# Patient Record
Sex: MALE | Race: WHITE | Employment: FULL TIME | URBAN - NONMETROPOLITAN AREA
[De-identification: names, ages, dates, MRNs, and addresses within clinical notes are randomized per-mention and may not be internally consistent; named-entity substitution may affect disease eponyms.]

---

## 2017-08-17 ENCOUNTER — OFFICE VISIT (OUTPATIENT)
Dept: CARDIOLOGY CLINIC | Age: 32
End: 2017-08-17
Payer: COMMERCIAL

## 2017-08-17 VITALS
BODY MASS INDEX: 26.05 KG/M2 | HEART RATE: 64 BPM | DIASTOLIC BLOOD PRESSURE: 70 MMHG | HEIGHT: 74 IN | WEIGHT: 203 LBS | SYSTOLIC BLOOD PRESSURE: 152 MMHG

## 2017-08-17 DIAGNOSIS — Z87.74 H/O COARCTATION OF AORTA: ICD-10-CM

## 2017-08-17 DIAGNOSIS — R01.1 SYSTOLIC MURMUR: Primary | ICD-10-CM

## 2017-08-17 PROCEDURE — 99213 OFFICE O/P EST LOW 20 MIN: CPT | Performed by: INTERNAL MEDICINE

## 2017-08-17 PROCEDURE — 93000 ELECTROCARDIOGRAM COMPLETE: CPT | Performed by: INTERNAL MEDICINE

## 2017-08-22 LAB
ALBUMIN SERPL-MCNC: 4.4 G/DL (ref 3.2–5.3)
ALK PHOSPHATASE: 60 IU/L (ref 35–121)
ALT SERPL-CCNC: 20 IU/L (ref 5–59)
AST SERPL-CCNC: 15 IU/L (ref 10–42)
BILIRUB SERPL-MCNC: 1.1 MG/DL (ref 0.2–1.3)
BILIRUBIN DIRECT: 0.2 MG/DL (ref 0–0.2)
CHOLESTEROL/HDL RATIO: 4.9
CHOLESTEROL: 170 MG/DL
HDLC SERPL-MCNC: 35 MG/DL (ref 40–60)
LDL CHOLESTEROL CALCULATED: 107 MG/DL
LDL/HDL RATIO: 3.1
TOTAL PROTEIN: 6.6 G/DL (ref 5.8–8)
TRIGL SERPL-MCNC: 140 MG/DL
TSH SERPL DL<=0.05 MIU/L-ACNC: 1.18 UIU/ML (ref 0.4–4.4)
VLDLC SERPL CALC-MCNC: 28 MG/DL

## 2018-08-16 ENCOUNTER — OFFICE VISIT (OUTPATIENT)
Dept: CARDIOLOGY CLINIC | Age: 33
End: 2018-08-16
Payer: COMMERCIAL

## 2018-08-16 VITALS
BODY MASS INDEX: 27.03 KG/M2 | SYSTOLIC BLOOD PRESSURE: 180 MMHG | WEIGHT: 210.6 LBS | DIASTOLIC BLOOD PRESSURE: 76 MMHG | HEIGHT: 74 IN | HEART RATE: 48 BPM

## 2018-08-16 DIAGNOSIS — Q25.1 COARCTATION OF AORTA: ICD-10-CM

## 2018-08-16 DIAGNOSIS — R01.1 SYSTOLIC MURMUR: Primary | ICD-10-CM

## 2018-08-16 PROCEDURE — 99213 OFFICE O/P EST LOW 20 MIN: CPT | Performed by: NUCLEAR MEDICINE

## 2018-08-16 PROCEDURE — 93000 ELECTROCARDIOGRAM COMPLETE: CPT | Performed by: NUCLEAR MEDICINE

## 2018-08-21 ENCOUNTER — HOSPITAL ENCOUNTER (OUTPATIENT)
Dept: CT IMAGING | Age: 33
Discharge: HOME | End: 2018-08-21
Payer: COMMERCIAL

## 2018-08-21 DIAGNOSIS — Q25.1 COARCTATION OF AORTA: ICD-10-CM

## 2018-08-21 DIAGNOSIS — R01.1 SYSTOLIC MURMUR: ICD-10-CM

## 2018-08-21 PROCEDURE — 6360000004 HC RX CONTRAST MEDICATION: Performed by: NUCLEAR MEDICINE

## 2018-08-21 PROCEDURE — 71275 CT ANGIOGRAPHY CHEST: CPT

## 2018-08-21 RX ADMIN — IOPAMIDOL 95 ML: 755 INJECTION, SOLUTION INTRAVENOUS at 07:10

## 2018-08-23 ENCOUNTER — TELEPHONE (OUTPATIENT)
Dept: CARDIOLOGY CLINIC | Age: 33
End: 2018-08-23

## 2018-08-23 NOTE — TELEPHONE ENCOUNTER
Tell him with his coarctation it is not very safe to do an exericise stress test, if anything I can do josesito   Also I think he should think about getting an opinion from thoracic surgeon to see if his aorta needs redone now that he is older.  He does have coarctation on the CTA which I expected with his elevated BP that is unusual for him   See what he wants to do or I can see to discuss these issues  Nothing urgent

## 2018-08-23 NOTE — TELEPHONE ENCOUNTER
Last OV pt was told you would decided on stress after CTA   CTA done yesterday   Pt needs clearance for DOT?   Please advise on stress test

## 2018-08-24 ENCOUNTER — TELEPHONE (OUTPATIENT)
Dept: CARDIOLOGY CLINIC | Age: 33
End: 2018-08-24

## 2018-08-24 NOTE — TELEPHONE ENCOUNTER
Pt is wondering if you don't want to do a exercise stress test on him, he is a runner, and lifts alot of weights,  And does not have any issues, should he be doing that stuff? His bp after sitting today for about 5 mins 151/70.

## 2018-08-24 NOTE — TELEPHONE ENCOUNTER
rubin said that he wants to see him to discuss options   He will look at his CTA and discuss with him

## 2018-08-28 ENCOUNTER — TELEPHONE (OUTPATIENT)
Dept: CARDIOLOGY CLINIC | Age: 33
End: 2018-08-28

## 2018-08-28 ENCOUNTER — OFFICE VISIT (OUTPATIENT)
Dept: CARDIOLOGY CLINIC | Age: 33
End: 2018-08-28
Payer: COMMERCIAL

## 2018-08-28 VITALS
HEART RATE: 80 BPM | HEIGHT: 74 IN | DIASTOLIC BLOOD PRESSURE: 113 MMHG | WEIGHT: 208 LBS | SYSTOLIC BLOOD PRESSURE: 198 MMHG | BODY MASS INDEX: 26.69 KG/M2

## 2018-08-28 DIAGNOSIS — Q25.1 COARCTATION OF AORTA: Primary | ICD-10-CM

## 2018-08-28 PROCEDURE — 99214 OFFICE O/P EST MOD 30 MIN: CPT | Performed by: INTERNAL MEDICINE

## 2018-09-04 ENCOUNTER — OFFICE VISIT (OUTPATIENT)
Dept: CARDIOLOGY CLINIC | Age: 33
End: 2018-09-04

## 2018-09-04 ENCOUNTER — TELEPHONE (OUTPATIENT)
Dept: CARDIOLOGY CLINIC | Age: 33
End: 2018-09-04

## 2018-09-04 VITALS
HEIGHT: 74 IN | WEIGHT: 208.8 LBS | SYSTOLIC BLOOD PRESSURE: 172 MMHG | BODY MASS INDEX: 26.8 KG/M2 | DIASTOLIC BLOOD PRESSURE: 92 MMHG

## 2018-09-04 DIAGNOSIS — Q25.1 COARCTATION OF AORTA: Primary | ICD-10-CM

## 2018-09-04 NOTE — TELEPHONE ENCOUNTER
Patient has a nurse visit for B/P checks 9/4/2018 at 10 am.   Called and talked to patient and he is going to bring in home B/P readings to his appt today.

## 2018-09-04 NOTE — PROGRESS NOTES
Called Dr. Sola Muniz he said to tell the pt that his B/P machine is wrong and he will call the pt later to start him on some medications.

## 2018-09-10 DIAGNOSIS — R01.1 SYSTOLIC MURMUR: Primary | ICD-10-CM

## 2018-09-10 RX ORDER — LOSARTAN POTASSIUM 25 MG/1
25 TABLET ORAL DAILY
Qty: 30 TABLET | Refills: 11 | Status: SHIPPED | OUTPATIENT
Start: 2018-09-10 | End: 2018-10-22

## 2018-09-10 RX ORDER — LOSARTAN POTASSIUM 25 MG/1
25 TABLET ORAL DAILY
COMMUNITY
End: 2018-09-10 | Stop reason: SDUPTHER

## 2018-09-10 RX ORDER — HYDROCHLOROTHIAZIDE 25 MG/1
25 TABLET ORAL DAILY
Qty: 30 TABLET | Refills: 11 | Status: SHIPPED | OUTPATIENT
Start: 2018-09-10 | End: 2019-11-16 | Stop reason: SDUPTHER

## 2018-09-10 RX ORDER — HYDROCHLOROTHIAZIDE 25 MG/1
25 TABLET ORAL DAILY
COMMUNITY
End: 2018-09-10 | Stop reason: SDUPTHER

## 2018-09-26 ENCOUNTER — OFFICE VISIT (OUTPATIENT)
Dept: CARDIOLOGY CLINIC | Age: 33
End: 2018-09-26
Payer: COMMERCIAL

## 2018-09-26 VITALS
DIASTOLIC BLOOD PRESSURE: 105 MMHG | WEIGHT: 209.6 LBS | HEART RATE: 72 BPM | SYSTOLIC BLOOD PRESSURE: 194 MMHG | HEIGHT: 74 IN | BODY MASS INDEX: 26.9 KG/M2

## 2018-09-26 DIAGNOSIS — I10 ESSENTIAL HYPERTENSION: ICD-10-CM

## 2018-09-26 DIAGNOSIS — Q25.1 COARCTATION OF AORTA: Primary | ICD-10-CM

## 2018-09-26 DIAGNOSIS — R01.1 SYSTOLIC MURMUR: ICD-10-CM

## 2018-09-26 LAB
ANION GAP SERPL CALCULATED.3IONS-SCNC: 14 MEQ/L (ref 10–19)
BUN BLDV-MCNC: 16 MG/DL (ref 8–23)
CALCIUM SERPL-MCNC: 9.4 MG/DL (ref 8.5–10.5)
CHLORIDE BLD-SCNC: 98 MEQ/L (ref 95–107)
CO2: 29 MEQ/L (ref 19–31)
CREAT SERPL-MCNC: 1.1 MG/DL (ref 0.8–1.4)
EGFR AFRICAN AMERICAN: 100.3 ML/MIN/1.73 M2
EGFR IF NONAFRICAN AMERICAN: 86.5 ML/MIN/1.73 M2
GLUCOSE: 108 MG/DL (ref 70–99)
POTASSIUM SERPL-SCNC: 4 MEQ/L (ref 3.5–5.4)
SODIUM BLD-SCNC: 141 MEQ/L (ref 135–146)

## 2018-09-26 PROCEDURE — 99213 OFFICE O/P EST LOW 20 MIN: CPT | Performed by: INTERNAL MEDICINE

## 2018-09-26 RX ORDER — LOSARTAN POTASSIUM 50 MG/1
50 TABLET ORAL DAILY
Qty: 30 TABLET | Refills: 3 | Status: SHIPPED | OUTPATIENT
Start: 2018-09-26 | End: 2018-11-05 | Stop reason: DRUGHIGH

## 2018-09-26 NOTE — PROGRESS NOTES
intolerance, polydipsia. Genitourinary: Negative for dysuria, enuresis, flank pain and hematuria. Musculoskeletal: Negative for arthralgias, joint swelling and neck pain. Neurological: Negative for numbness and headaches. Psychiatric/Behavioral: Negative for agitation, confusion, decreased concentration and dysphoric mood. Objective:     BP (!) 194/105 (Site: Left Calf, Position: Sitting, Cuff Size: Large Adult)   Pulse 72   Ht 6' 2\" (1.88 m)   Wt 209 lb 9.6 oz (95.1 kg)   BMI 26.91 kg/m²     Wt Readings from Last 3 Encounters:   09/26/18 209 lb 9.6 oz (95.1 kg)   09/04/18 208 lb 12.8 oz (94.7 kg)   08/28/18 208 lb (94.3 kg)     BP Readings from Last 3 Encounters:   09/26/18 (!) 194/105   09/04/18 (!) 172/92   08/28/18 (!) 198/113       Nursing note and vitals reviewed. LE BP > UE BP    Physical Exam   Constitutional: Oriented to person, place, and time. Appears well-developed and well-nourished. HENT:   Head: Normocephalic and atraumatic. Eyes: EOM are normal. Pupils are equal, round, and reactive to light. Neck: Normal range of motion. Neck supple. No JVD present. Cardiovascular: Normal rate, regular rhythm, normal heart sounds and intact distal pulses. + systolic murmur at the apex  Pulmonary/Chest: Effort normal and breath sounds normal. No respiratory distress. No wheezes. No rales. Abdominal: Soft. Bowel sounds are normal. No distension. There is no tenderness. Musculoskeletal: Normal range of motion. No edema. Neurological: Alert and oriented to person, place, and time. No cranial nerve deficit. Coordination normal.   Skin: Skin is warm and dry. Psychiatric: Normal mood and affect.        No results found for: CKTOTAL, CKMB, CKMBINDEX    Lab Results   Component Value Date    WBC 7.9 11/24/2016    RBC 4.71 11/24/2016    RBC 4.71 02/23/2012    HGB 15.0 11/24/2016    HCT 42.5 11/24/2016    MCV 90.2 11/24/2016    MCH 31.9 11/24/2016    MCHC 35.3 11/24/2016    RDW 12.9 11/24/2016

## 2018-10-22 ENCOUNTER — OFFICE VISIT (OUTPATIENT)
Dept: CARDIOLOGY CLINIC | Age: 33
End: 2018-10-22
Payer: COMMERCIAL

## 2018-10-22 VITALS
SYSTOLIC BLOOD PRESSURE: 194 MMHG | HEIGHT: 74 IN | BODY MASS INDEX: 25.67 KG/M2 | WEIGHT: 200 LBS | DIASTOLIC BLOOD PRESSURE: 95 MMHG | HEART RATE: 74 BPM

## 2018-10-22 DIAGNOSIS — Q25.1 COARCTATION OF AORTA: ICD-10-CM

## 2018-10-22 DIAGNOSIS — I15.8 OTHER SECONDARY HYPERTENSION: Primary | ICD-10-CM

## 2018-10-22 PROCEDURE — 99213 OFFICE O/P EST LOW 20 MIN: CPT | Performed by: INTERNAL MEDICINE

## 2018-10-22 RX ORDER — AMLODIPINE BESYLATE 10 MG/1
10 TABLET ORAL DAILY
Qty: 30 TABLET | Refills: 3 | Status: SHIPPED | OUTPATIENT
Start: 2018-10-22 | End: 2018-11-05 | Stop reason: SDUPTHER

## 2018-10-22 RX ORDER — LOSARTAN POTASSIUM 100 MG/1
100 TABLET ORAL DAILY
COMMUNITY
End: 2018-11-05 | Stop reason: SDUPTHER

## 2018-10-22 RX ORDER — HYDRALAZINE HYDROCHLORIDE 25 MG/1
25 TABLET, FILM COATED ORAL 3 TIMES DAILY
Qty: 90 TABLET | Refills: 3 | Status: SHIPPED | OUTPATIENT
Start: 2018-10-22 | End: 2018-11-05 | Stop reason: SDUPTHER

## 2018-10-22 NOTE — PROGRESS NOTES
09/25/2018    LABGLOM 77 11/24/2016    GLUCOSE 108 09/25/2018       Lab Results   Component Value Date    ALKPHOS 60 08/21/2017    ALT 20 08/21/2017    AST 15 08/21/2017    PROT 6.6 08/21/2017    BILITOT 1.1 08/21/2017    BILIDIR 0.2 08/21/2017    LABALBU 4.4 08/21/2017       No results found for: MG    No results found for: INR, PROTIME      No results found for: LABA1C    Lab Results   Component Value Date    TRIG 140 08/21/2017    HDL 35 08/21/2017    LDLCALC 107 08/21/2017    LABVLDL 28 08/21/2017       Lab Results   Component Value Date    TSH 1.180 08/21/2017         Testing Reviewed:      I have individually reviewed the cardiac test below:    ECHO:   Results for orders placed during the hospital encounter of 05/08/15   ECHO Complete 2D W Doppler W Color        Assessment/Plan   Re-coarctation of the Aorta  HTN  Add Norvasc 10 mg q day, increase Losartan to 100 mg q day, add Hydralazine 25 mg TID. Would add Spironolactone 25 mg PO q day next. May need renal consultation as well for management. May need cath to evaluate gradient and discuss coarctation stenting.     Disposition:  2-4 weeks    Electronically signed by Vasile Moreno MD   10/22/2018 at 8:33 AM

## 2018-11-05 ENCOUNTER — OFFICE VISIT (OUTPATIENT)
Dept: CARDIOLOGY CLINIC | Age: 33
End: 2018-11-05
Payer: COMMERCIAL

## 2018-11-05 VITALS
DIASTOLIC BLOOD PRESSURE: 118 MMHG | HEART RATE: 90 BPM | WEIGHT: 203.1 LBS | SYSTOLIC BLOOD PRESSURE: 183 MMHG | HEIGHT: 74 IN | BODY MASS INDEX: 26.06 KG/M2

## 2018-11-05 DIAGNOSIS — Q25.1 COARCTATION OF AORTA: Primary | ICD-10-CM

## 2018-11-05 PROCEDURE — 99212 OFFICE O/P EST SF 10 MIN: CPT | Performed by: INTERNAL MEDICINE

## 2018-11-05 RX ORDER — AMLODIPINE BESYLATE 10 MG/1
10 TABLET ORAL DAILY
Qty: 90 TABLET | Refills: 3 | Status: SHIPPED | OUTPATIENT
Start: 2018-11-05 | End: 2019-11-14 | Stop reason: SDUPTHER

## 2018-11-05 RX ORDER — HYDRALAZINE HYDROCHLORIDE 25 MG/1
25 TABLET, FILM COATED ORAL 3 TIMES DAILY
Qty: 270 TABLET | Refills: 3 | Status: SHIPPED | OUTPATIENT
Start: 2018-11-05 | End: 2019-11-14 | Stop reason: SDUPTHER

## 2018-11-05 RX ORDER — LOSARTAN POTASSIUM 100 MG/1
100 TABLET ORAL DAILY
Qty: 90 TABLET | Refills: 3 | Status: SHIPPED | OUTPATIENT
Start: 2018-11-05 | End: 2019-11-14 | Stop reason: SDUPTHER

## 2018-11-05 NOTE — PROGRESS NOTES
in stool, constipation, diarrhea   Endocrine: Negative for cold intolerance, heat intolerance, polydipsia. Genitourinary: Negative for dysuria, enuresis, flank pain and hematuria. Musculoskeletal: Negative for arthralgias, joint swelling and neck pain. Neurological: Negative for numbness and headaches. Psychiatric/Behavioral: Negative for agitation, confusion, decreased concentration and dysphoric mood. Objective:     BP (!) 183/118 Comment (Site): left ankle  Pulse 90   Ht 6' 2\" (1.88 m)   Wt 203 lb 1.6 oz (92.1 kg)   BMI 26.08 kg/m²     Wt Readings from Last 3 Encounters:   11/05/18 203 lb 1.6 oz (92.1 kg)   10/22/18 200 lb (90.7 kg)   09/26/18 209 lb 9.6 oz (95.1 kg)     BP Readings from Last 3 Encounters:   11/05/18 (!) 183/118   10/22/18 (!) 194/95   09/26/18 (!) 194/105       Nursing note and vitals reviewed. Physical Exam   Constitutional: Oriented to person, place, and time. Appears well-developed and well-nourished. HENT:   Head: Normocephalic and atraumatic. Eyes: EOM are normal. Pupils are equal, round, and reactive to light. Neck: Normal range of motion. Neck supple. No JVD present. Cardiovascular: Normal rate, regular rhythm, normal heart sounds and intact distal pulses. 3/6 CHAVEZ. Pulmonary/Chest: Effort normal and breath sounds normal. No respiratory distress. No wheezes. No rales. Abdominal: Soft. Bowel sounds are normal. No distension. There is no tenderness. Musculoskeletal: Normal range of motion. No edema. Neurological: Alert and oriented to person, place, and time. No cranial nerve deficit. Coordination normal.   Skin: Skin is warm and dry. Psychiatric: Normal mood and affect.        No results found for: CKTOTAL, CKMB, CKMBINDEX    Lab Results   Component Value Date    WBC 7.9 11/24/2016    RBC 4.71 11/24/2016    RBC 4.71 02/23/2012    HGB 15.0 11/24/2016    HCT 42.5 11/24/2016    MCV 90.2 11/24/2016    MCH 31.9 11/24/2016    MCHC 35.3 11/24/2016    RDW 12.9 11/24/2016     11/24/2016    MPV 8.5 11/24/2016       Lab Results   Component Value Date     09/25/2018    K 4.0 09/25/2018    CL 98 09/25/2018    CO2 29 09/25/2018    BUN 16 09/25/2018    LABALBU 4.4 08/21/2017    CREATININE 1.1 09/25/2018    CALCIUM 9.4 09/25/2018    LABGLOM 77 11/24/2016    GLUCOSE 108 09/25/2018       Lab Results   Component Value Date    ALKPHOS 60 08/21/2017    ALT 20 08/21/2017    AST 15 08/21/2017    PROT 6.6 08/21/2017    BILITOT 1.1 08/21/2017    BILIDIR 0.2 08/21/2017    LABALBU 4.4 08/21/2017       No results found for: MG    No results found for: INR, PROTIME      No results found for: LABA1C    Lab Results   Component Value Date    TRIG 140 08/21/2017    HDL 35 08/21/2017    LDLCALC 107 08/21/2017    LABVLDL 28 08/21/2017       Lab Results   Component Value Date    TSH 1.180 08/21/2017         Testing Reviewed:      I have individually reviewed the cardiac test below:    ECHO:   Results for orders placed during the hospital encounter of 05/08/15   ECHO Complete 2D W Doppler W Color        Assessment/Plan   Re-coarctation of the Aorta  Secondary HTN  Continue current therapy. Will discuss with Dr. Tristan Jacobsen of [facility] regarding next steps in terms of pursuing cardiac catheterization for gradient evaluation and possible balloon/stenting of the aorta.       Disposition:  Based on quaternary care recommendations  6 months      Electronically signed by Anell Frankel, MD   11/5/2018 at 7:41 AM

## 2018-11-06 VITALS — HEIGHT: 74 IN | WEIGHT: 200 LBS | BODY MASS INDEX: 25.67 KG/M2

## 2018-11-08 ENCOUNTER — TELEPHONE (OUTPATIENT)
Dept: CARDIOLOGY CLINIC | Age: 33
End: 2018-11-08

## 2018-11-14 ENCOUNTER — HOSPITAL ENCOUNTER (OUTPATIENT)
Dept: INPATIENT UNIT | Age: 33
Discharge: HOME | End: 2018-11-14

## 2019-11-14 DIAGNOSIS — Q25.1 COARCTATION OF AORTA: ICD-10-CM

## 2019-11-15 ENCOUNTER — HOSPITAL ENCOUNTER (EMERGENCY)
Age: 34
Discharge: HOME | End: 2019-11-15
Attending: EMERGENCY MEDICINE
Payer: COMMERCIAL

## 2019-11-15 VITALS
OXYGEN SATURATION: 96 % | WEIGHT: 195 LBS | HEIGHT: 74 IN | SYSTOLIC BLOOD PRESSURE: 159 MMHG | BODY MASS INDEX: 25.03 KG/M2 | RESPIRATION RATE: 18 BRPM | HEART RATE: 81 BPM | DIASTOLIC BLOOD PRESSURE: 87 MMHG | TEMPERATURE: 98.7 F

## 2019-11-15 DIAGNOSIS — I15.9 SECONDARY HYPERTENSION: Primary | ICD-10-CM

## 2019-11-15 DIAGNOSIS — Q25.1 COARCTATION OF AORTA: ICD-10-CM

## 2019-11-15 LAB
EKG ATRIAL RATE: 82 BPM
EKG P AXIS: 50 DEGREES
EKG P-R INTERVAL: 132 MS
EKG Q-T INTERVAL: 366 MS
EKG QRS DURATION: 96 MS
EKG QTC CALCULATION (BAZETT): 427 MS
EKG R AXIS: 79 DEGREES
EKG T AXIS: 50 DEGREES
EKG VENTRICULAR RATE: 82 BPM

## 2019-11-15 PROCEDURE — 6370000000 HC RX 637 (ALT 250 FOR IP): Performed by: EMERGENCY MEDICINE

## 2019-11-15 PROCEDURE — 93005 ELECTROCARDIOGRAM TRACING: CPT | Performed by: EMERGENCY MEDICINE

## 2019-11-15 PROCEDURE — 99283 EMERGENCY DEPT VISIT LOW MDM: CPT

## 2019-11-15 RX ORDER — LOSARTAN POTASSIUM 100 MG/1
100 TABLET ORAL ONCE
Status: COMPLETED | OUTPATIENT
Start: 2019-11-15 | End: 2019-11-15

## 2019-11-15 RX ORDER — AMLODIPINE BESYLATE 10 MG/1
10 TABLET ORAL DAILY
Qty: 30 TABLET | Refills: 0 | Status: SHIPPED | OUTPATIENT
Start: 2019-11-15 | End: 2020-01-14 | Stop reason: ALTCHOICE

## 2019-11-15 RX ORDER — HYDROCHLOROTHIAZIDE 25 MG/1
25 TABLET ORAL ONCE
Status: COMPLETED | OUTPATIENT
Start: 2019-11-15 | End: 2019-11-15

## 2019-11-15 RX ORDER — HYDROCHLOROTHIAZIDE 25 MG/1
25 TABLET ORAL DAILY
Qty: 30 TABLET | Refills: 1 | Status: CANCELLED | OUTPATIENT
Start: 2019-11-15

## 2019-11-15 RX ORDER — HYDROCHLOROTHIAZIDE 25 MG/1
25 TABLET ORAL EVERY MORNING
Qty: 30 TABLET | Refills: 0 | Status: SHIPPED | OUTPATIENT
Start: 2019-11-15 | End: 2020-01-14 | Stop reason: ALTCHOICE

## 2019-11-15 RX ORDER — LOSARTAN POTASSIUM 100 MG/1
100 TABLET ORAL DAILY
Qty: 30 TABLET | Refills: 0 | Status: SHIPPED | OUTPATIENT
Start: 2019-11-15 | End: 2020-01-14 | Stop reason: ALTCHOICE

## 2019-11-15 RX ORDER — HYDRALAZINE HYDROCHLORIDE 25 MG/1
25 TABLET, FILM COATED ORAL 3 TIMES DAILY
Qty: 90 TABLET | Refills: 0 | Status: SHIPPED | OUTPATIENT
Start: 2019-11-15 | End: 2020-01-14 | Stop reason: ALTCHOICE

## 2019-11-15 RX ADMIN — HYDROCHLOROTHIAZIDE 25 MG: 25 TABLET ORAL at 20:35

## 2019-11-15 RX ADMIN — LOSARTAN POTASSIUM 100 MG: 100 TABLET, FILM COATED ORAL at 20:35

## 2019-11-15 ASSESSMENT — ENCOUNTER SYMPTOMS
EYE PAIN: 0
EYE DISCHARGE: 0
DIARRHEA: 0
STRIDOR: 0
VOMITING: 0
BACK PAIN: 0
RHINORRHEA: 0
CHEST TIGHTNESS: 0
COUGH: 0
WHEEZING: 0
NAUSEA: 0
SHORTNESS OF BREATH: 0
CONSTIPATION: 0
ABDOMINAL PAIN: 0
ABDOMINAL DISTENTION: 0
PHOTOPHOBIA: 0
EYE ITCHING: 0
EYE REDNESS: 0
SORE THROAT: 0

## 2019-11-16 DIAGNOSIS — Z87.74 H/O COARCTATION OF AORTA: Primary | ICD-10-CM

## 2019-11-16 DIAGNOSIS — R01.1 SYSTOLIC MURMUR: ICD-10-CM

## 2019-11-16 PROCEDURE — 93010 ELECTROCARDIOGRAM REPORT: CPT | Performed by: INTERNAL MEDICINE

## 2019-11-16 RX ORDER — LOSARTAN POTASSIUM 100 MG/1
TABLET ORAL
Qty: 90 TABLET | Refills: 0 | Status: SHIPPED | OUTPATIENT
Start: 2019-11-16 | End: 2019-12-09 | Stop reason: SDUPTHER

## 2019-11-16 RX ORDER — HYDRALAZINE HYDROCHLORIDE 25 MG/1
TABLET, FILM COATED ORAL
Qty: 270 TABLET | Refills: 0 | Status: SHIPPED | OUTPATIENT
Start: 2019-11-16 | End: 2019-12-09 | Stop reason: SDUPTHER

## 2019-11-16 RX ORDER — AMLODIPINE BESYLATE 10 MG/1
TABLET ORAL
Qty: 90 TABLET | Refills: 0 | Status: SHIPPED | OUTPATIENT
Start: 2019-11-16 | End: 2019-12-09 | Stop reason: SDUPTHER

## 2019-11-18 ENCOUNTER — TELEPHONE (OUTPATIENT)
Dept: FAMILY MEDICINE CLINIC | Age: 34
End: 2019-11-18

## 2019-11-18 RX ORDER — HYDROCHLOROTHIAZIDE 25 MG/1
TABLET ORAL
Qty: 30 TABLET | Refills: 1 | Status: SHIPPED | OUTPATIENT
Start: 2019-11-18 | End: 2019-12-09 | Stop reason: SDUPTHER

## 2019-11-25 LAB
ANION GAP SERPL CALCULATED.3IONS-SCNC: 7 MMOL/L (ref 4–12)
BUN BLDV-MCNC: 14 MG/DL (ref 5–23)
CALCIUM SERPL-MCNC: 9.6 MG/DL (ref 8.5–10.5)
CHLORIDE BLD-SCNC: 101 MMOL/L (ref 98–109)
CO2: 32 MMOL/L (ref 22–32)
CREAT SERPL-MCNC: 1.08 MG/DL (ref 0.6–1.3)
EGFR AFRICAN AMERICAN: >60 ML/MIN/1.73SQ.M
EGFR IF NONAFRICAN AMERICAN: >60 ML/MIN/1.73SQ.M
GLUCOSE: 95 MG/DL (ref 65–99)
POTASSIUM SERPL-SCNC: 4 MMOL/L (ref 3.5–5)
SODIUM BLD-SCNC: 140 MMOL/L (ref 134–146)

## 2019-12-09 ENCOUNTER — OFFICE VISIT (OUTPATIENT)
Dept: CARDIOLOGY CLINIC | Age: 34
End: 2019-12-09
Payer: COMMERCIAL

## 2019-12-09 VITALS
BODY MASS INDEX: 25.8 KG/M2 | HEIGHT: 74 IN | SYSTOLIC BLOOD PRESSURE: 140 MMHG | DIASTOLIC BLOOD PRESSURE: 84 MMHG | WEIGHT: 201 LBS | HEART RATE: 80 BPM

## 2019-12-09 DIAGNOSIS — Q25.1 COARCTATION OF AORTA: ICD-10-CM

## 2019-12-09 DIAGNOSIS — I15.8 OTHER SECONDARY HYPERTENSION: Primary | ICD-10-CM

## 2019-12-09 PROCEDURE — 99212 OFFICE O/P EST SF 10 MIN: CPT | Performed by: INTERNAL MEDICINE

## 2019-12-09 RX ORDER — HYDRALAZINE HYDROCHLORIDE 50 MG/1
TABLET, FILM COATED ORAL
Qty: 90 TABLET | Refills: 3 | Status: SHIPPED | OUTPATIENT
Start: 2019-12-09 | End: 2020-05-04

## 2019-12-09 RX ORDER — LOSARTAN POTASSIUM 100 MG/1
TABLET ORAL
Qty: 90 TABLET | Refills: 3 | Status: SHIPPED | OUTPATIENT
Start: 2019-12-09 | End: 2020-12-15 | Stop reason: SDUPTHER

## 2019-12-09 RX ORDER — HYDROCHLOROTHIAZIDE 25 MG/1
TABLET ORAL
Qty: 90 TABLET | Refills: 3 | Status: SHIPPED | OUTPATIENT
Start: 2019-12-09 | End: 2020-12-15 | Stop reason: SDUPTHER

## 2019-12-09 RX ORDER — AMLODIPINE BESYLATE 10 MG/1
TABLET ORAL
Qty: 90 TABLET | Refills: 3 | Status: SHIPPED | OUTPATIENT
Start: 2019-12-09 | End: 2020-12-15 | Stop reason: SDUPTHER

## 2020-01-14 ENCOUNTER — HOSPITAL ENCOUNTER (OUTPATIENT)
Dept: NON INVASIVE DIAGNOSTICS | Age: 35
Discharge: HOME | End: 2020-01-14
Payer: COMMERCIAL

## 2020-01-14 VITALS — HEIGHT: 74 IN | WEIGHT: 195 LBS | BODY MASS INDEX: 25.03 KG/M2

## 2020-01-14 PROCEDURE — 93017 CV STRESS TEST TRACING ONLY: CPT | Performed by: INTERNAL MEDICINE

## 2020-01-15 ENCOUNTER — TELEPHONE (OUTPATIENT)
Dept: CARDIOLOGY CLINIC | Age: 35
End: 2020-01-15

## 2020-03-12 ENCOUNTER — OFFICE VISIT (OUTPATIENT)
Dept: FAMILY MEDICINE CLINIC | Age: 35
End: 2020-03-12
Payer: COMMERCIAL

## 2020-03-12 ENCOUNTER — TELEPHONE (OUTPATIENT)
Dept: FAMILY MEDICINE CLINIC | Age: 35
End: 2020-03-12

## 2020-03-12 VITALS
BODY MASS INDEX: 25.99 KG/M2 | WEIGHT: 202.5 LBS | DIASTOLIC BLOOD PRESSURE: 62 MMHG | HEART RATE: 72 BPM | HEIGHT: 74 IN | RESPIRATION RATE: 20 BRPM | SYSTOLIC BLOOD PRESSURE: 152 MMHG | TEMPERATURE: 98.2 F

## 2020-03-12 PROCEDURE — 99203 OFFICE O/P NEW LOW 30 MIN: CPT | Performed by: NURSE PRACTITIONER

## 2020-03-12 RX ORDER — PREDNISONE 50 MG/1
50 TABLET ORAL DAILY
Qty: 4 TABLET | Refills: 0 | Status: SHIPPED | OUTPATIENT
Start: 2020-03-12 | End: 2020-03-16

## 2020-03-12 SDOH — ECONOMIC STABILITY: FOOD INSECURITY: WITHIN THE PAST 12 MONTHS, THE FOOD YOU BOUGHT JUST DIDN'T LAST AND YOU DIDN'T HAVE MONEY TO GET MORE.: NEVER TRUE

## 2020-03-12 SDOH — ECONOMIC STABILITY: FOOD INSECURITY: WITHIN THE PAST 12 MONTHS, YOU WORRIED THAT YOUR FOOD WOULD RUN OUT BEFORE YOU GOT MONEY TO BUY MORE.: NEVER TRUE

## 2020-03-12 SDOH — ECONOMIC STABILITY: INCOME INSECURITY: HOW HARD IS IT FOR YOU TO PAY FOR THE VERY BASICS LIKE FOOD, HOUSING, MEDICAL CARE, AND HEATING?: NOT HARD AT ALL

## 2020-03-12 SDOH — ECONOMIC STABILITY: TRANSPORTATION INSECURITY
IN THE PAST 12 MONTHS, HAS LACK OF TRANSPORTATION KEPT YOU FROM MEETINGS, WORK, OR FROM GETTING THINGS NEEDED FOR DAILY LIVING?: NO

## 2020-03-12 SDOH — ECONOMIC STABILITY: TRANSPORTATION INSECURITY
IN THE PAST 12 MONTHS, HAS THE LACK OF TRANSPORTATION KEPT YOU FROM MEDICAL APPOINTMENTS OR FROM GETTING MEDICATIONS?: NO

## 2020-03-12 ASSESSMENT — PATIENT HEALTH QUESTIONNAIRE - PHQ9
SUM OF ALL RESPONSES TO PHQ QUESTIONS 1-9: 0
2. FEELING DOWN, DEPRESSED OR HOPELESS: 0
SUM OF ALL RESPONSES TO PHQ QUESTIONS 1-9: 0
1. LITTLE INTEREST OR PLEASURE IN DOING THINGS: 0
SUM OF ALL RESPONSES TO PHQ9 QUESTIONS 1 & 2: 0

## 2020-03-12 ASSESSMENT — ENCOUNTER SYMPTOMS
SHORTNESS OF BREATH: 0
SORE THROAT: 0
COUGH: 0
RHINORRHEA: 0
ABDOMINAL PAIN: 0
VOICE CHANGE: 1
NAUSEA: 0

## 2020-03-12 NOTE — LETTER
[address] [address]  Phone: [phone]  Fax: [phone], APRN - CNP        March 12, 2020     Patient: Beth Juarez   YOB: 1985   Date of Visit: 3/12/2020       To Whom it May Concern:    Rodolfo Posada was seen in my clinic on 3/12/2020. Based on exam and history he is deemed is non-infectious. If you have any questions or concerns, please don't hesitate to call.     Sincerely,         BARBARA March CNP

## 2020-03-12 NOTE — PROGRESS NOTES
Subjective:      Patient ID: Beth Juarez is a 37 y.o. male. HPI: Acute for Laryngitis    Chief Complaint   Patient presents with    New Patient    Laryngitis     started Tuesday night       Onset of 2 days with hoarse voice. 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  Throat started bothering there after. Losing his voice off and on. DEnies ST or dysphagia. No coughing. Physically feels well. No body aches or fevers. Vitals:    03/12/20 1109   BP: (!) 152/62   Pulse:    Resp:    Temp:        Hx of secondary HTN - following with [facility]. On Losartan, HCTZ, Norvasc and Hydralyzine. BP running good at home. Ole Fletcher    Lab Results   Component Value Date     11/25/2019    K 4.0 11/25/2019     11/25/2019    CO2 32 11/25/2019    BUN 14 11/25/2019    CREATININE 1.08 11/25/2019    GLUCOSE 95 11/25/2019    CALCIUM 9.6 11/25/2019          Review of Systems   Constitutional: Negative for chills and fever. HENT: Positive for postnasal drip and voice change. Negative for congestion, rhinorrhea and sore throat. Respiratory: Negative for cough and shortness of breath. Cardiovascular: Negative for chest pain. Gastrointestinal: Negative for abdominal pain and nausea. Skin: Negative for rash. Neurological: Negative for dizziness, light-headedness and headaches. Psychiatric/Behavioral: Negative. Objective:   Physical Exam  Constitutional:       General: He is not in acute distress. Eyes:      Pupils: Pupils are equal, round, and reactive to light. Neck:      Musculoskeletal: Normal range of motion and neck supple. Cardiovascular:      Rate and Rhythm: Normal rate and regular rhythm. Heart sounds: No murmur. Pulmonary:      Effort: Pulmonary effort is normal.      Breath sounds: Normal breath sounds. No wheezing. Abdominal:      General: Bowel sounds are normal. There is no distension. Palpations: Abdomen is soft. Tenderness: There is no abdominal tenderness. Musculoskeletal: Normal range of motion. General: No tenderness. Skin:     General: Skin is warm and dry. Findings: No rash. Assessment:       Diagnosis Orders   1.  Laryngitis, acute  predniSONE (DELTASONE) 50 MG tablet           Plan:      Symptoms non-infectious  Orders as above   Warm fluids, voice rest  FOllow up with [facility]   - BP running wnl at home  RTO if symptoms worsen or stay the same              Lenore Lemus APRN - CNP

## 2020-03-12 NOTE — PROGRESS NOTES
Visit Information    Have you changed or started any medications since your last visit including any over-the-counter medicines, vitamins, or herbal medicines? no   Are you having any side effects from any of your medications? -  no  Have you stopped taking any of your medications? Is so, why? -  no    Have you seen any other physician or provider since your last visit? No  Have you had any other diagnostic tests since your last visit? No  Have you been seen in the emergency room and/or had an admission to a hospital since we last saw you? No  Have you had your routine dental cleaning in the past 6 months? n/a    Have you activated your [employer] account? If not, what are your barriers?  No: declined     Patient Care Team:  BARBARA Smith CNP as PCP - General (Nurse Practitioner)    Medical History Review  Past Medical, Family, and Social History reviewed and does not contribute to the patient presenting condition    Health Maintenance   Topic Date Due    Varicella vaccine (1 of 2 - 2-dose childhood series) 05/23/1984    HIV screen  05/23/1998    DTaP/Tdap/Td vaccine (1 - Tdap) 05/23/2002    Flu vaccine (1) 09/01/2019    Potassium monitoring  11/25/2020    Creatinine monitoring  11/25/2020    Shingles Vaccine (1 of 2) 05/23/2033    Hepatitis A vaccine  Aged Out    Hepatitis B vaccine  Aged Out    Hib vaccine  Aged Out    Meningococcal (ACWY) vaccine  Aged Out    Pneumococcal 0-64 years Vaccine  Aged Out

## 2020-05-04 RX ORDER — HYDRALAZINE HYDROCHLORIDE 50 MG/1
TABLET, FILM COATED ORAL
Qty: 90 TABLET | Refills: 3 | Status: SHIPPED | OUTPATIENT
Start: 2020-05-04 | End: 2020-11-03 | Stop reason: SDUPTHER

## 2020-05-06 ENCOUNTER — TELEPHONE (OUTPATIENT)
Dept: CARDIOLOGY CLINIC | Age: 35
End: 2020-05-06

## 2020-05-06 NOTE — TELEPHONE ENCOUNTER
Pt would like to see Dr. Angela Estrada. Laying at night in bed, feels like his heart is skipping a beat. Not every night, not during day, no other symptoms.   Please advise

## 2020-05-07 ENCOUNTER — HOSPITAL ENCOUNTER (OUTPATIENT)
Dept: NON INVASIVE DIAGNOSTICS | Age: 35
Discharge: HOME | End: 2020-05-07
Payer: COMMERCIAL

## 2020-05-07 PROCEDURE — 93226 XTRNL ECG REC<48 HR SCAN A/R: CPT

## 2020-05-07 PROCEDURE — 93225 XTRNL ECG REC<48 HRS REC: CPT

## 2020-05-13 LAB
ACQUISITION DURATION: NORMAL S
AVERAGE HEART RATE: 65 BPM
FASTEST SUPRAVENTRICULAR RATE: 127 BPM
HOOKUP DATE: NORMAL
HOOKUP TIME: NORMAL
LONGEST SUPRAVENTRICULAR RATE: 90 BPM
MAX HEART RATE TIME/DATE: NORMAL
MAX HEART RATE: 142 BPM
MIN HEART RATE TIME/DATE: NORMAL
MIN HEART RATE: 38 BPM
NUMBER OF FASTEST SUPRAVENTRICULAR BEATS: 4
NUMBER OF LONGEST SUPRAVENTRICULAR BEATS: 75
NUMBER OF QRS COMPLEXES: NORMAL
NUMBER OF SUPRAVENTRICULAR BEATS IN RUNS: 873
NUMBER OF SUPRAVENTRICULAR COUPLETS: 124
NUMBER OF SUPRAVENTRICULAR ECTOPICS: 2390
NUMBER OF SUPRAVENTRICULAR ISOLATED BEATS: 1269
NUMBER OF SUPRAVENTRICULAR RUNS: 96
NUMBER OF VENTRICULAR BEATS IN RUNS: 0
NUMBER OF VENTRICULAR BIGEMINAL CYCLES: 0
NUMBER OF VENTRICULAR COUPLETS: 0
NUMBER OF VENTRICULAR ECTOPICS: 466
NUMBER OF VENTRICULAR ISOLATED BEATS: 466
NUMBER OF VENTRICULAR RUNS: 0

## 2020-05-14 NOTE — TELEPHONE ENCOUNTER
Results of holter monitor  10% of the time patient was tachycardic  44% of the time patient was bradycardic  Max R-R is 2.3 seconds  466 PVCs  1269 PACs  96 SVT runs, fastest was 4 beats at 127 bpm, and longest was 75 beats at 90 bpm     Palpitations intermittently correlated to PVCs.

## 2020-05-18 RX ORDER — METOPROLOL SUCCINATE 50 MG/1
50 TABLET, EXTENDED RELEASE ORAL DAILY
Qty: 30 TABLET | Refills: 5 | Status: SHIPPED | OUTPATIENT
Start: 2020-05-18 | End: 2020-11-12 | Stop reason: SDUPTHER

## 2020-06-03 ENCOUNTER — OFFICE VISIT (OUTPATIENT)
Dept: CARDIOLOGY CLINIC | Age: 35
End: 2020-06-03
Payer: COMMERCIAL

## 2020-06-03 VITALS
DIASTOLIC BLOOD PRESSURE: 72 MMHG | HEART RATE: 68 BPM | HEIGHT: 74 IN | WEIGHT: 197 LBS | BODY MASS INDEX: 25.28 KG/M2 | SYSTOLIC BLOOD PRESSURE: 128 MMHG

## 2020-06-03 PROCEDURE — 99213 OFFICE O/P EST LOW 20 MIN: CPT | Performed by: INTERNAL MEDICINE

## 2020-11-03 RX ORDER — HYDRALAZINE HYDROCHLORIDE 50 MG/1
50 TABLET, FILM COATED ORAL 3 TIMES DAILY
Qty: 90 TABLET | Refills: 6 | Status: SHIPPED | OUTPATIENT
Start: 2020-11-03 | End: 2021-08-11 | Stop reason: SDUPTHER

## 2020-11-12 RX ORDER — METOPROLOL SUCCINATE 50 MG/1
50 TABLET, EXTENDED RELEASE ORAL DAILY
Qty: 30 TABLET | Refills: 11 | Status: SHIPPED | OUTPATIENT
Start: 2020-11-12 | End: 2021-07-27

## 2020-12-15 RX ORDER — LOSARTAN POTASSIUM 100 MG/1
100 TABLET ORAL DAILY
Qty: 90 TABLET | Refills: 3 | Status: SHIPPED | OUTPATIENT
Start: 2020-12-15 | End: 2021-08-11 | Stop reason: SDUPTHER

## 2020-12-15 RX ORDER — AMLODIPINE BESYLATE 10 MG/1
10 TABLET ORAL DAILY
Qty: 90 TABLET | Refills: 3 | Status: SHIPPED | OUTPATIENT
Start: 2020-12-15 | End: 2021-07-27

## 2020-12-15 RX ORDER — HYDROCHLOROTHIAZIDE 25 MG/1
TABLET ORAL
Qty: 90 TABLET | Refills: 3 | Status: SHIPPED | OUTPATIENT
Start: 2020-12-15 | End: 2021-07-27 | Stop reason: DRUGHIGH

## 2021-01-21 ENCOUNTER — TELEPHONE (OUTPATIENT)
Dept: CARDIOLOGY CLINIC | Age: 36
End: 2021-01-21

## 2021-01-21 NOTE — TELEPHONE ENCOUNTER
Received  DOT physical clearance request. Last appointment was 6-3-2020. Next appt 6-3-2021. Is he clear?     Form in Griffin's box

## 2021-04-19 ENCOUNTER — VIRTUAL VISIT (OUTPATIENT)
Dept: FAMILY MEDICINE CLINIC | Age: 36
End: 2021-04-19
Payer: COMMERCIAL

## 2021-04-19 DIAGNOSIS — B34.9 VIRAL SYNDROME: Primary | ICD-10-CM

## 2021-04-19 DIAGNOSIS — R09.81 NASAL CONGESTION: ICD-10-CM

## 2021-04-19 PROCEDURE — 99213 OFFICE O/P EST LOW 20 MIN: CPT | Performed by: NURSE PRACTITIONER

## 2021-04-19 ASSESSMENT — ENCOUNTER SYMPTOMS
COUGH: 0
NAUSEA: 0
ABDOMINAL PAIN: 0
RHINORRHEA: 0
SHORTNESS OF BREATH: 0

## 2021-07-26 NOTE — PROGRESS NOTES
[facility] PROFESSIONAL SERVICES  [facility]   [address] [place] [address]   [address] [address]   Dept: [phone]   Dept Fax: [phone]   Loc: [phone]      Chief Complaint   Patient presents with    Follow-up    Dizziness     Cardiologist:  Dr. Jett Garcia  43year old male presents for swelling, dizziness. Has hx of Coarc. Of the aorta, SVT and PVCs found on heart monitor. Patient states he has had swelling of the LE and dizziness recently, along with pain behind his eyes. States he had severe pain of the left knee over the kneecap and has noticed swelling over the kneecap, seems to be associated with frequently working on his hands and knees recently. States he looked up side effects of his medications and thinks he may have gout in his knee and eye pain may also be related to HCTZ. States he also experiences palpitations at bedtime after taking his  Nighttime medications. Patient states he can feel his heart racing right now because he is nervous about being here in the office. States he has not seen PCP recently. His BP has been lower side recently, has not monitored HR recently. General:   No fever, no chills, No fatigue or weight loss  Pulmonary:    No dyspnea, no wheezing  Cardiac:    Denies recent chest pain, does have palpitations at nighttime.    GI:     No nausea or vomiting, no abdominal pain  Neuro:     Has dizziness, light headedness  Musculoskeletal:  No recent active issues  Extremities:   No edema, good peripheral pulses      Past Medical History:   Diagnosis Date    Hypertension        No Known Allergies    Current Outpatient Medications   Medication Sig Dispense Refill    hydroCHLOROthiazide (HYDRODIURIL) 25 MG tablet Take 0.5 tablets by mouth daily 30 tablet 3    amLODIPine (NORVASC) 10 MG tablet Take 0.5 tablets by mouth daily 90 tablet 3    metoprolol succinate (TOPROL XL) 50 MG extended release tablet Take 1.5 tablets by mouth daily 30 tablet 11    losartan (COZAAR) 100 MG tablet Take 1 tablet by mouth daily 90 tablet 3    hydrALAZINE (APRESOLINE) 50 MG tablet Take 1 tablet by mouth 3 times daily (Patient taking differently: Take 50 mg by mouth 2 times daily ) 90 tablet 6     No current facility-administered medications for this visit. Social History     Socioeconomic History    Marital status: Single     Spouse name: None    Number of children: 3    Years of education: 15    Highest education level: Associate degree: occupational, technical, or vocational program   Occupational History    None   Tobacco Use    Smoking status: Never Smoker    Smokeless tobacco: Never Used   Substance and Sexual Activity    Alcohol use: Yes     Alcohol/week: 3.0 standard drinks     Types: 3 Cans of beer per week     Comment: rare 3 times a year    Drug use: No    Sexual activity: Yes     Partners: Female   Other Topics Concern    None   Social History Narrative    None     Social Determinants of Health     Financial Resource Strain:     Difficulty of Paying Living Expenses:    Food Insecurity:     Worried About Running Out of Food in the Last Year:     Ran Out of Food in the Last Year:    Transportation Needs:     Lack of Transportation (Medical):      Lack of Transportation (Non-Medical):    Physical Activity:     Days of Exercise per Week:     Minutes of Exercise per Session:    Stress:     Feeling of Stress :    Social Connections:     Frequency of Communication with Friends and Family:     Frequency of Social Gatherings with Friends and Family:     Attends Yarsani Services:     Active Member of Clubs or Organizations:     Attends Club or Organization Meetings:     Marital Status:    Intimate Partner Violence:     Fear of Current or Ex-Partner:     Emotionally Abused:     Physically Abused:     Sexually Abused:        Family History   Problem Relation Age of Onset    High Blood Pressure Mother     Heart Disease Mother    [facility] Other Father Not health related       Blood pressure (!) 125/59, pulse 138, weight 170 lb (77.1 kg). General:   Well developed, well nourished  Lungs:   Clear to auscultation  Heart:    Increased rate, Normal S1 S2, No murmur, rubs, or gallops  Abdomen:   Soft, non tender, no organomegalies, positive bowel sounds  Extremities:   Edema of the LE, particular around the feet and ankles, L>R, swelling over the kneecap, nontender to palpation today, no cyanosis, good peripheral pulses  Neurological:   Awake, alert, oriented. No obvious focal deficits  Musculoskeletal:  No obvious deformities    EKG: Results reviewed. Sinus tachycardia noted, 130s          Diagnosis Orders   1. Hx of supraventricular tachycardia  EKG 12 Lead   2. History of repair of coarctation of aorta  EKG 12 Lead   3. Coarctation of aorta  amLODIPine (NORVASC) 10 MG tablet   4. Essential hypertension     5. Sinus tachycardia by electrocardiogram     6. Leg swelling         Orders Placed This Encounter   Procedures    EKG 12 Lead     Order Specific Question:   Reason for Exam?     Answer:   Irregular heart rate     Assessment/Plan:   Patient has several conditions at present. some may be related to medication side effects including LE swelling and knee pain, which does appear to possibly be gout. Patient has hx of SVT, which appears to be the case today with sinus tachy 130s. Will decrease amlopidine to 5 mg daily, decrease HCTZ to 12.5 mg daily and may take earlier in the day as patient getting up frequently at night to urinate. Increase Toprol to 75 mg daily for HR control and BP for decreasing other meds. D/w Dr Paco Barrow get CTA w/wo contrast. Will check BMP as prior history of some kidney concern. Disposition:   F/u with Dr Cherise Olson as scheduled 08/11.     Continue Dr Erik Dye current treatment plan  Follow up with Dr Cherise Olson as scheduled or sooner if needed

## 2021-07-27 ENCOUNTER — TELEPHONE (OUTPATIENT)
Dept: CARDIOLOGY CLINIC | Age: 36
End: 2021-07-27

## 2021-07-27 ENCOUNTER — OFFICE VISIT (OUTPATIENT)
Dept: CARDIOLOGY CLINIC | Age: 36
End: 2021-07-27
Payer: COMMERCIAL

## 2021-07-27 VITALS
SYSTOLIC BLOOD PRESSURE: 125 MMHG | DIASTOLIC BLOOD PRESSURE: 59 MMHG | BODY MASS INDEX: 21.83 KG/M2 | WEIGHT: 170 LBS | HEART RATE: 138 BPM

## 2021-07-27 DIAGNOSIS — Q25.1 COARCTATION OF AORTA: ICD-10-CM

## 2021-07-27 DIAGNOSIS — I10 ESSENTIAL HYPERTENSION: ICD-10-CM

## 2021-07-27 DIAGNOSIS — M79.89 LEG SWELLING: ICD-10-CM

## 2021-07-27 DIAGNOSIS — Q25.1 COARCTATION OF AORTA: Primary | ICD-10-CM

## 2021-07-27 DIAGNOSIS — R00.0 SINUS TACHYCARDIA BY ELECTROCARDIOGRAM: ICD-10-CM

## 2021-07-27 DIAGNOSIS — Z87.74 HISTORY OF REPAIR OF COARCTATION OF AORTA: ICD-10-CM

## 2021-07-27 DIAGNOSIS — Z86.79 HX OF SUPRAVENTRICULAR TACHYCARDIA: Primary | ICD-10-CM

## 2021-07-27 PROCEDURE — 99214 OFFICE O/P EST MOD 30 MIN: CPT | Performed by: STUDENT IN AN ORGANIZED HEALTH CARE EDUCATION/TRAINING PROGRAM

## 2021-07-27 PROCEDURE — 93000 ELECTROCARDIOGRAM COMPLETE: CPT | Performed by: STUDENT IN AN ORGANIZED HEALTH CARE EDUCATION/TRAINING PROGRAM

## 2021-07-27 RX ORDER — METOPROLOL SUCCINATE 50 MG/1
75 TABLET, EXTENDED RELEASE ORAL DAILY
Qty: 30 TABLET | Refills: 11 | Status: SHIPPED | OUTPATIENT
Start: 2021-07-27 | End: 2021-08-11 | Stop reason: SDUPTHER

## 2021-07-27 RX ORDER — AMLODIPINE BESYLATE 10 MG/1
5 TABLET ORAL DAILY
Qty: 90 TABLET | Refills: 3 | Status: SHIPPED | OUTPATIENT
Start: 2021-07-27 | End: 2021-08-11 | Stop reason: SDUPTHER

## 2021-07-27 RX ORDER — HYDROCHLOROTHIAZIDE 25 MG/1
12.5 TABLET ORAL DAILY
Qty: 30 TABLET | Refills: 3 | Status: SHIPPED | OUTPATIENT
Start: 2021-07-27 | End: 2021-08-11

## 2021-07-27 NOTE — TELEPHONE ENCOUNTER
Tano discussed with Dr. Ganesh Chavez and he is wanting patient to have a CT Scan before appt on 8/11/2021. Attempted to call patient, UT, no VM. Order given to scheduling. Ordered by [id number].

## 2021-07-27 NOTE — PROGRESS NOTES
Follow-up. Patient has recently lost a lot of weight, he is wondering if he had COVID, had no appetite. He states his appetite is slowly getting better. Patient having intermittent dizziness and lightheadedness. He denies having any chest pain or shortness of breath. He has had intermittent OSWALDO. EKG completed.

## 2021-07-27 NOTE — TELEPHONE ENCOUNTER
Call to pt, ct chest scheduled 08-05-21 @ 7:40am  Arrive 7:10am  Out pt express testing  NPO 4 hrs prior    Pt informed date, time and instructions    Pt also to have CMP drawn prior to appt  Pt informed

## 2021-07-29 ENCOUNTER — OFFICE VISIT (OUTPATIENT)
Dept: FAMILY MEDICINE CLINIC | Age: 36
End: 2021-07-29
Payer: COMMERCIAL

## 2021-07-29 VITALS
BODY MASS INDEX: 22.88 KG/M2 | RESPIRATION RATE: 16 BRPM | WEIGHT: 172.6 LBS | HEART RATE: 100 BPM | SYSTOLIC BLOOD PRESSURE: 128 MMHG | HEIGHT: 73 IN | DIASTOLIC BLOOD PRESSURE: 62 MMHG

## 2021-07-29 DIAGNOSIS — M77.42 METATARSALGIA OF LEFT FOOT: ICD-10-CM

## 2021-07-29 DIAGNOSIS — M70.42 PREPATELLAR BURSITIS OF BOTH KNEES: Primary | ICD-10-CM

## 2021-07-29 DIAGNOSIS — M70.41 PREPATELLAR BURSITIS OF BOTH KNEES: Primary | ICD-10-CM

## 2021-07-29 PROCEDURE — 99213 OFFICE O/P EST LOW 20 MIN: CPT | Performed by: NURSE PRACTITIONER

## 2021-07-29 SDOH — ECONOMIC STABILITY: FOOD INSECURITY: WITHIN THE PAST 12 MONTHS, THE FOOD YOU BOUGHT JUST DIDN'T LAST AND YOU DIDN'T HAVE MONEY TO GET MORE.: NEVER TRUE

## 2021-07-29 SDOH — ECONOMIC STABILITY: FOOD INSECURITY: WITHIN THE PAST 12 MONTHS, YOU WORRIED THAT YOUR FOOD WOULD RUN OUT BEFORE YOU GOT MONEY TO BUY MORE.: NEVER TRUE

## 2021-07-29 ASSESSMENT — ENCOUNTER SYMPTOMS
SHORTNESS OF BREATH: 0
ABDOMINAL PAIN: 0
NAUSEA: 0
COUGH: 0

## 2021-07-29 ASSESSMENT — PATIENT HEALTH QUESTIONNAIRE - PHQ9
SUM OF ALL RESPONSES TO PHQ9 QUESTIONS 1 & 2: 0
SUM OF ALL RESPONSES TO PHQ QUESTIONS 1-9: 0
1. LITTLE INTEREST OR PLEASURE IN DOING THINGS: 0
2. FEELING DOWN, DEPRESSED OR HOPELESS: 0
SUM OF ALL RESPONSES TO PHQ QUESTIONS 1-9: 0
SUM OF ALL RESPONSES TO PHQ QUESTIONS 1-9: 0

## 2021-07-29 ASSESSMENT — SOCIAL DETERMINANTS OF HEALTH (SDOH): HOW HARD IS IT FOR YOU TO PAY FOR THE VERY BASICS LIKE FOOD, HOUSING, MEDICAL CARE, AND HEATING?: NOT HARD AT ALL

## 2021-07-29 NOTE — PROGRESS NOTES
Lenka Rushing (1985) 43 y.o. male here for evaluation of the following chief complaint(s):      HPI:  Chief Complaint   Patient presents with    Joint Pain    Joint Swelling    Foot Pain       Acute onset of left knee pain, swelling and restriction. This resolved and came on right knee. Still some swelling in right knee anterior. Active at work. Works on knees and walking stairs frequent    Onset of Saturday with left foot pain when he woke up. NKI. Left foot pain, swelling, redness. Has improved everyday - missed work Monday and rested. Pain on foot pad. No callous or skin changes. Vitals:    07/29/21 0809   BP: 128/62   Pulse: 100   Resp: 16       Patient Active Problem List   Diagnosis    Migraine headache with aura    Systolic murmur    H/O coarctation of aorta, repair at 12 months old       SUBJECTIVE/OBJECTIVE:  Review of Systems   Constitutional: Negative for chills and fever. HENT: Negative. Respiratory: Negative for cough and shortness of breath. Cardiovascular: Negative for chest pain. Gastrointestinal: Negative for abdominal pain and nausea. Musculoskeletal: Positive for arthralgias and joint swelling. Skin: Negative for rash. Neurological: Negative for dizziness, light-headedness and headaches. Psychiatric/Behavioral: Negative. Physical Exam  Constitutional:       General: He is not in acute distress. Eyes:      Pupils: Pupils are equal, round, and reactive to light. Cardiovascular:      Rate and Rhythm: Normal rate and regular rhythm. Heart sounds: No murmur heard. Pulmonary:      Effort: Pulmonary effort is normal.      Breath sounds: Normal breath sounds. No wheezing. Abdominal:      General: Bowel sounds are normal. There is no distension. Palpations: Abdomen is soft. Tenderness: There is no abdominal tenderness. Musculoskeletal:         General: Normal range of motion.       Cervical back: Normal range of motion and

## 2021-08-03 NOTE — TELEPHONE ENCOUNTER
Mirian at 2424 asking if testing should be CTA instead of CT. According to jamie note, its states CTA.     D/w Dr Janette Khan get CTA w/wo contrast. Will check BMP as prior history of some kidney concern.

## 2021-08-03 NOTE — TELEPHONE ENCOUNTER
Spoke with Jake Dawson in central scheduling, CT changed to CTA for same date and time. Pre-cert dept notified of change with change of procedure/CPT code automatically per Oswaldo. Patient arrival time and prep unchanged.

## 2021-08-05 ENCOUNTER — HOSPITAL ENCOUNTER (OUTPATIENT)
Age: 36
Discharge: HOME | End: 2021-08-05
Payer: COMMERCIAL

## 2021-08-05 ENCOUNTER — HOSPITAL ENCOUNTER (OUTPATIENT)
Dept: CT IMAGING | Age: 36
Discharge: HOME | End: 2021-08-05
Payer: COMMERCIAL

## 2021-08-05 DIAGNOSIS — Q25.1 COARCTATION OF AORTA: ICD-10-CM

## 2021-08-05 DIAGNOSIS — M79.89 LEG SWELLING: ICD-10-CM

## 2021-08-05 DIAGNOSIS — I10 ESSENTIAL HYPERTENSION: ICD-10-CM

## 2021-08-05 LAB
ALBUMIN SERPL-MCNC: 3.6 G/DL (ref 3.5–5.1)
ALP BLD-CCNC: 74 U/L (ref 38–126)
ALT SERPL-CCNC: 19 U/L (ref 11–66)
ANION GAP SERPL CALCULATED.3IONS-SCNC: 8 MEQ/L (ref 8–16)
AST SERPL-CCNC: 15 U/L (ref 5–40)
BILIRUB SERPL-MCNC: 0.8 MG/DL (ref 0.3–1.2)
BUN BLDV-MCNC: 9 MG/DL (ref 7–22)
CALCIUM SERPL-MCNC: 9.2 MG/DL (ref 8.5–10.5)
CHLORIDE BLD-SCNC: 94 MEQ/L (ref 98–111)
CO2: 28 MEQ/L (ref 23–33)
CREAT SERPL-MCNC: 0.8 MG/DL (ref 0.4–1.2)
GFR SERPL CREATININE-BSD FRML MDRD: > 90 ML/MIN/1.73M2
GLUCOSE BLD-MCNC: 116 MG/DL (ref 70–108)
POTASSIUM SERPL-SCNC: 5 MEQ/L (ref 3.5–5.2)
SODIUM BLD-SCNC: 130 MEQ/L (ref 135–145)
TOTAL PROTEIN: 6.7 G/DL (ref 6.1–8)

## 2021-08-05 PROCEDURE — 71275 CT ANGIOGRAPHY CHEST: CPT

## 2021-08-05 PROCEDURE — 6360000004 HC RX CONTRAST MEDICATION: Performed by: STUDENT IN AN ORGANIZED HEALTH CARE EDUCATION/TRAINING PROGRAM

## 2021-08-05 PROCEDURE — 80053 COMPREHEN METABOLIC PANEL: CPT

## 2021-08-05 PROCEDURE — 36415 COLL VENOUS BLD VENIPUNCTURE: CPT

## 2021-08-05 RX ADMIN — IOPAMIDOL 90 ML: 755 INJECTION, SOLUTION INTRAVENOUS at 07:45

## 2021-08-09 ENCOUNTER — HOSPITAL ENCOUNTER (OUTPATIENT)
Age: 36
Discharge: HOME | End: 2021-08-09
Payer: COMMERCIAL

## 2021-08-09 ENCOUNTER — HOSPITAL ENCOUNTER (OUTPATIENT)
Dept: GENERAL RADIOLOGY | Age: 36
Discharge: HOME | End: 2021-08-09
Payer: COMMERCIAL

## 2021-08-09 ENCOUNTER — OFFICE VISIT (OUTPATIENT)
Dept: FAMILY MEDICINE CLINIC | Age: 36
End: 2021-08-09
Payer: COMMERCIAL

## 2021-08-09 VITALS
DIASTOLIC BLOOD PRESSURE: 66 MMHG | SYSTOLIC BLOOD PRESSURE: 136 MMHG | HEART RATE: 94 BPM | WEIGHT: 168.5 LBS | RESPIRATION RATE: 16 BRPM | HEIGHT: 73 IN | BODY MASS INDEX: 22.33 KG/M2

## 2021-08-09 DIAGNOSIS — R13.19 ESOPHAGEAL DYSPHAGIA: ICD-10-CM

## 2021-08-09 DIAGNOSIS — M79.675 GREAT TOE PAIN, LEFT: ICD-10-CM

## 2021-08-09 DIAGNOSIS — M79.675 GREAT TOE PAIN, LEFT: Primary | ICD-10-CM

## 2021-08-09 LAB — URIC ACID: 3.7 MG/DL (ref 3.7–7)

## 2021-08-09 PROCEDURE — 36415 COLL VENOUS BLD VENIPUNCTURE: CPT

## 2021-08-09 PROCEDURE — 73630 X-RAY EXAM OF FOOT: CPT

## 2021-08-09 PROCEDURE — 99213 OFFICE O/P EST LOW 20 MIN: CPT | Performed by: NURSE PRACTITIONER

## 2021-08-09 PROCEDURE — 84550 ASSAY OF BLOOD/URIC ACID: CPT

## 2021-08-09 RX ORDER — PREDNISONE 50 MG/1
50 TABLET ORAL DAILY
Qty: 5 TABLET | Refills: 0 | Status: SHIPPED | OUTPATIENT
Start: 2021-08-09 | End: 2021-08-14

## 2021-08-09 ASSESSMENT — ENCOUNTER SYMPTOMS
ABDOMINAL PAIN: 0
NAUSEA: 0
SHORTNESS OF BREATH: 0
COUGH: 0
TROUBLE SWALLOWING: 1

## 2021-08-09 NOTE — PROGRESS NOTES
Amber Sprague (1985) 43 y.o. male here for evaluation of the following chief complaint(s):      HPI:  Chief Complaint   Patient presents with    Toe Pain     Patient has been experiencing left foot/big toe pain since over the weekend    Gastroesophageal Reflux       Onset of 2 days with right big toe swelling, pain. Sensitive. Put ICE on it yesterday and after icing looked like skin suddenly bruised. Sensitive to touch and cold. No issues with warm soaks. 2 weeks ago had unexplained foot pain that he was seen but was improving on own. Vitals:    08/09/21 1132   BP: 136/66   Pulse: 94   Resp: 16       Continues with gagging. If looks up will gag. Feel like meat consumption will get stuck if he does not chew it up well. Patient Active Problem List   Diagnosis    Migraine headache with aura    Systolic murmur    H/O coarctation of aorta, repair at 12 months old       SUBJECTIVE/OBJECTIVE:  Review of Systems   Constitutional: Negative for chills and fever. HENT: Positive for trouble swallowing. Respiratory: Negative for cough and shortness of breath. Cardiovascular: Negative for chest pain. Gastrointestinal: Negative for abdominal pain and nausea. Musculoskeletal: Positive for arthralgias and joint swelling. Skin: Negative for rash. Neurological: Negative for dizziness, light-headedness and headaches. Psychiatric/Behavioral: Negative. Physical Exam  Constitutional:       General: He is not in acute distress. Eyes:      Pupils: Pupils are equal, round, and reactive to light. Cardiovascular:      Rate and Rhythm: Normal rate and regular rhythm. Heart sounds: No murmur heard. Pulmonary:      Effort: Pulmonary effort is normal.      Breath sounds: Normal breath sounds. No wheezing. Abdominal:      General: Bowel sounds are normal. There is no distension. Palpations: Abdomen is soft. Tenderness: There is no abdominal tenderness. Musculoskeletal:         General: Normal range of motion. Cervical back: Normal range of motion and neck supple. Feet:    Skin:     General: Skin is warm and dry. Findings: No rash. ASSESSMENT/PLAN:   Diagnosis Orders   1. Great toe pain, left  XR FOOT LEFT (MIN 3 VIEWS)    Uric Acid    predniSONE (DELTASONE) 50 MG tablet   2. Esophageal dysphagia  [facility]FL - Loria Castleman, MD, Gastroenterology, [facility]         MDM: Gout, Psuedogout, Turf Toe, Infection   Prednisone Burst   Warm Soaks   XR Foot and Uric Acid    - if URIC acid elevated have CARDIO adjust off HCTZ   REfer to GASTRO for EGD   RTO PRN    An electronic signature was used to authenticate this note.     --BARBARA Whitman - CNP

## 2021-08-11 ENCOUNTER — OFFICE VISIT (OUTPATIENT)
Dept: CARDIOLOGY CLINIC | Age: 36
End: 2021-08-11
Payer: COMMERCIAL

## 2021-08-11 VITALS
WEIGHT: 167.8 LBS | BODY MASS INDEX: 21.53 KG/M2 | SYSTOLIC BLOOD PRESSURE: 142 MMHG | HEART RATE: 100 BPM | DIASTOLIC BLOOD PRESSURE: 82 MMHG | HEIGHT: 74 IN

## 2021-08-11 DIAGNOSIS — I10 ESSENTIAL HYPERTENSION: ICD-10-CM

## 2021-08-11 DIAGNOSIS — R00.2 PALPITATIONS: ICD-10-CM

## 2021-08-11 DIAGNOSIS — Q25.1 COARCTATION OF AORTA: ICD-10-CM

## 2021-08-11 DIAGNOSIS — M79.89 LEG SWELLING: Primary | ICD-10-CM

## 2021-08-11 PROCEDURE — 99214 OFFICE O/P EST MOD 30 MIN: CPT | Performed by: INTERNAL MEDICINE

## 2021-08-11 RX ORDER — AMLODIPINE BESYLATE 5 MG/1
5 TABLET ORAL DAILY
Qty: 90 TABLET | Refills: 3 | Status: SHIPPED | OUTPATIENT
Start: 2021-08-11 | End: 2021-11-18

## 2021-08-11 RX ORDER — METOPROLOL SUCCINATE 100 MG/1
100 TABLET, EXTENDED RELEASE ORAL DAILY
Qty: 60 TABLET | Refills: 3 | Status: SHIPPED | OUTPATIENT
Start: 2021-08-11 | End: 2022-01-03 | Stop reason: SDUPTHER

## 2021-08-11 RX ORDER — HYDRALAZINE HYDROCHLORIDE 50 MG/1
50 TABLET, FILM COATED ORAL 2 TIMES DAILY
Qty: 180 TABLET | Refills: 3 | Status: SHIPPED | OUTPATIENT
Start: 2021-08-11 | End: 2021-11-18

## 2021-08-11 RX ORDER — LOSARTAN POTASSIUM 100 MG/1
100 TABLET ORAL DAILY
Qty: 90 TABLET | Refills: 3 | Status: SHIPPED | OUTPATIENT
Start: 2021-08-11 | End: 2021-11-18

## 2021-08-11 NOTE — PROGRESS NOTES
[address] [facility] [address] [place] [address]  Dept: [phone]  Dept Fax: [phone]  Loc: [phone]    Visit Date: 8/11/2021    Mr. Suman Rider is a 43 y.o. male  who presented for:  Chief Complaint   Patient presents with    1 Year Follow Up    Dizziness       HPI:   HPI   42 yo M hx of Coarc of Aorta, SVT/PVCs who presents for follow-up. Notes heart rate is pounding and it worries him. He also notes some lightheadedness when he stands up. Has intermittent joint pain. He is taking Prednisone. He notes more LE swelling. He does not note this all the time. No chest pain. No LOC. Current Outpatient Medications:     predniSONE (DELTASONE) 50 MG tablet, Take 1 tablet by mouth daily for 5 days, Disp: 5 tablet, Rfl: 0    hydroCHLOROthiazide (HYDRODIURIL) 25 MG tablet, Take 0.5 tablets by mouth daily, Disp: 30 tablet, Rfl: 3    amLODIPine (NORVASC) 10 MG tablet, Take 0.5 tablets by mouth daily, Disp: 90 tablet, Rfl: 3    metoprolol succinate (TOPROL XL) 50 MG extended release tablet, Take 1.5 tablets by mouth daily, Disp: 30 tablet, Rfl: 11    losartan (COZAAR) 100 MG tablet, Take 1 tablet by mouth daily, Disp: 90 tablet, Rfl: 3    hydrALAZINE (APRESOLINE) 50 MG tablet, Take 1 tablet by mouth 3 times daily (Patient taking differently: Take 50 mg by mouth 2 times daily ), Disp: 90 tablet, Rfl: 6    Past Medical History  Marti Ramirez  has a past medical history of Hypertension. Social History  Marti Ramirez  reports that he has never smoked. He has never used smokeless tobacco. He reports current alcohol use of about 3.0 standard drinks of alcohol per week. He reports that he does not use drugs. Family History  Meliton family history includes Heart Disease in his mother; High Blood Pressure in his mother; Other in his father.     There is no family history of bicuspid aortic valve, aneurysms, heart transplant, pacemakers, No edema. Neurological: Alert and oriented to person, place, and time. No cranial nerve deficit. Coordination normal.   Skin: Skin is warm and dry. Psychiatric: Normal mood and affect.        No results found for: CKTOTAL, CKMB, CKMBINDEX    Lab Results   Component Value Date    WBC 7.9 2016    RBC 4.71 2016    RBC 4.71 2012    HGB 15.0 2016    HCT 42.5 2016    MCV 90.2 2016    MCH 31.9 2016    MCHC 35.3 2016    RDW 12.9 2016     2016    MPV 8.5 2016       Lab Results   Component Value Date     2021    K 5.0 2021    CL 94 2021    CO2 28 2021    BUN 9 2021    LABALBU 3.6 2021    CREATININE 0.8 2021    CALCIUM 9.2 2021    LABGLOM >90 2021    GLUCOSE 116 2021    GLUCOSE 95 2019       Lab Results   Component Value Date    ALKPHOS 74 2021    ALT 19 2021    AST 15 2021    PROT 6.7 2021    BILITOT 0.8 2021    BILIDIR 0.2 2017    LABALBU 3.6 2021       No results found for: MG    No results found for: INR, PROTIME      No results found for: LABA1C    Lab Results   Component Value Date    TRIG 140 2017    HDL 35 2017    LDLCALC 107 2017    LABVLDL 28 2017       Lab Results   Component Value Date    TSH 1.180 2017         Testing Reviewed:      I have individually reviewed the cardiac test below:    ECHO: Results for orders placed during the hospital encounter of 05/08/15    ECHO Complete 2D W Doppler W Color    Narrative  [facility] [email or web address]  Phone [phone] [place] [phone] [address] [address].    Recognized by the [facility]) as an [facility]chocardiography  Transthoracic Echocardiogram  2D, M-mode, Doppler, and Color Doppler    Name: Ivette Jw  : [id number]  MR #: [id number]  Study date: 08-May-2015  Account #: [id number]  Age: 30 years  Gender: Male  Ht-Wt-BSA: 76 in- 193.6 lb- 2.15 mÂ²    Reading Physician:  Pratibha Hirsch DO  Technologist:  Luis Jose RDCS, FLORENCET, JAMSHIDMS  Referring Physician:  Pratibha Hirsch DO    Reason for study: coarctation    PROCEDURE: The procedure was performed in outpatient. This was a routine study. The transthoracic approach was used. The study included complete 2D imaging, M-mode, complete spectral Doppler, and color Doppler. Systolic blood pressure was 178 mmHg, at the start of the study. Diastolic blood pressure was 72 mmHg, at the start of  the study. Image quality was adequate. LEFT VENTRICLE: The ventricle was mildly to moderately dilated. Systolic function was normal. Ejection fraction was  estimated in the range of 55 % to 65 %. There were no regional wall motion abnormalities. Wall thickness was normal.  DOPPLER: Left ventricular diastolic function parameters were normal.    AORTIC VALVE: The valve was functionally bicuspid. Leaflets exhibited normal thickness and normal cuspal separation. DOPPLER: Transaortic velocity was within the normal range. There was no evidence for stenosis. There was mild  regurgitation. The regurgitant jet was eccentric. AORTA: The root exhibited normal size. MITRAL VALVE: Valve structure was normal. There was mild thickening of the anterior leaflet. There was normal leaflet  separation. There was a mild prolapse involving the anterior leaflet. DOPPLER: The transmitral velocity was within the  normal range. There was no evidence for stenosis. There was mild regurgitation. LEFT ATRIUM: Size was normal.    RIGHT VENTRICLE: The size was normal. Systolic function was normal. Wall thickness was normal.    PULMONIC VALVE: Leaflets exhibited normal thickness, no calcification, and normal cuspal separation. DOPPLER: The  transpulmonic velocity was within the normal range. There was no regurgitation.     PULMONARY ARTERY: The size was normal. DOPPLER: Systolic pressure was mildly increased. Estimated peak pressure was at  least 39 mmHg. TRICUSPID VALVE: The valve structure was normal. There was normal leaflet separation. DOPPLER: The transtricuspid  velocity was within the normal range. There was no evidence for stenosis. There was mild regurgitation. RIGHT ATRIUM: Size was normal.    SYSTEMIC VEINS: IVC: The inferior vena cava was normal in size. PERICARDIUM: There was no pericardial effusion. The pericardium was normal in appearance. SYSTEM MEASUREMENT TABLES    2D mode  LA Dimension (2D): 3.2 cm  FS (2D-Cubed): 28.6 %  FS (2D-Teich): 28.6 %  IVSd (2D): 0.9 cm  IVSd; Mean chosen (2D): 0.9 cm  LVIDd (2D): 6.3 cm  LVIDs (2D): 4.5 cm  LVPWd (2D): 0.9 cm  RVIDd (2D): 2.4 cm    M mode  AoR Diam (MM): 3.1 cm  AV Cusp Sep (MM): 2.1 cm  MV D-E Exc: 2.3 cm  MV EF Cheatham (MM): 16.9 cm/s    Unspecified Scan Mode  VTI; Antegrade Flow: 37.7 cm  Vmax; Antegrade Flow: 1.7 m/s  LVOT Vmax: 82.2 cm/s  MV Peak E Kenneth; Antegrade Flow: 141 cm/s  Vmax; Antegrade Flow: 78 cm/s  TV Peak A Kenneth: 47.4 cm/s  TV Peak E Kenneth; Antegrade Flow: 73.5 cm/s    SUMMARY:    Left ventricle: The ventricle was mildly to moderately dilated. Systolic function was normal. Ejection fraction was estimated in the range of 55 % to 65 %. There were no regional wall motion abnormalities. Mitral valve: There was a mild prolapse involving the anterior leaflet. There was mild regurgitation. Aortic valve: The valve was functionally bicuspid. Leaflets exhibited normal thickness and normal cuspal separation. There was mild regurgitation. Tricuspid valve: There was mild regurgitation. Pulmonary arteries:  Systolic pressure was mildly increased. Estimated peak pressure was at least 39 mmHg.     Prepared and signed by    Dez White DO  Signed 08-May-2015 16:38:37       Assessment/Plan   PACs  SVT  Hx of Coar of Aorta - 1.8 cm stable, 8/2021  Secondary HTN - Hydralazine can cause lupus like symptoms, will observe for now  Increase BB more, stop HCTZ, cut Norvasc to 5 mg q day. Monitor BP and HR. Lost significant weight. Needs to drink more water. Monitor for symptoms. Discussed diet/exercise/BP/weight loss/health lifestyle choices/lipids; the patient understands the goals and will try to comply.     Disposition:  6 months         Electronically signed by Samantha Noe MD   8/11/2021 at 10:58 AM EDT

## 2021-08-11 NOTE — PROGRESS NOTES
Patient denies having any chest pain, shortness of breath, dizziness or palpitations. Patient complains of lightheadedness.

## 2021-10-09 ENCOUNTER — APPOINTMENT (OUTPATIENT)
Dept: GENERAL RADIOLOGY | Age: 36
DRG: 871 | End: 2021-10-09
Payer: COMMERCIAL

## 2021-10-09 ENCOUNTER — APPOINTMENT (OUTPATIENT)
Dept: CT IMAGING | Age: 36
DRG: 871 | End: 2021-10-09
Payer: COMMERCIAL

## 2021-10-09 ENCOUNTER — HOSPITAL ENCOUNTER (INPATIENT)
Age: 36
LOS: 5 days | Discharge: ANOTHER HOSPITAL | DRG: 871 | End: 2021-10-14
Attending: EMERGENCY MEDICINE | Admitting: FAMILY MEDICINE
Payer: COMMERCIAL

## 2021-10-09 ENCOUNTER — APPOINTMENT (OUTPATIENT)
Dept: MRI IMAGING | Age: 36
DRG: 871 | End: 2021-10-09
Payer: COMMERCIAL

## 2021-10-09 DIAGNOSIS — E87.1 HYPONATREMIA: ICD-10-CM

## 2021-10-09 DIAGNOSIS — E87.6 HYPOKALEMIA: ICD-10-CM

## 2021-10-09 DIAGNOSIS — R47.81 SLURRED SPEECH: ICD-10-CM

## 2021-10-09 DIAGNOSIS — E83.42 HYPOMAGNESEMIA: ICD-10-CM

## 2021-10-09 DIAGNOSIS — A41.9 SEPTICEMIA (HCC): Primary | ICD-10-CM

## 2021-10-09 PROBLEM — I63.9 CARDIOEMBOLIC STROKE (HCC): Status: ACTIVE | Noted: 2021-10-09

## 2021-10-09 LAB
ABO: NORMAL
ABSOLUTE RETIC #: 56 THOU/MM3 (ref 20–115)
ALBUMIN SERPL-MCNC: 3 G/DL (ref 3.5–5.1)
ALP BLD-CCNC: 72 U/L (ref 38–126)
ALT SERPL-CCNC: 14 U/L (ref 11–66)
AMPHETAMINE+METHAMPHETAMINE URINE SCREEN: NEGATIVE
ANION GAP SERPL CALCULATED.3IONS-SCNC: 11 MEQ/L (ref 8–16)
ANION GAP SERPL CALCULATED.3IONS-SCNC: 7 MEQ/L (ref 8–16)
ANTIBODY SCREEN: NORMAL
APTT: 23.3 SECONDS (ref 22–38)
AST SERPL-CCNC: 15 U/L (ref 5–40)
BACTERIA: ABNORMAL /HPF
BARBITURATE QUANTITATIVE URINE: NEGATIVE
BASOPHILS # BLD: 0.1 %
BASOPHILS ABSOLUTE: 0 THOU/MM3 (ref 0–0.1)
BENZODIAZEPINE QUANTITATIVE URINE: NEGATIVE
BILIRUB SERPL-MCNC: 1 MG/DL (ref 0.3–1.2)
BILIRUBIN URINE: NEGATIVE
BLOOD, URINE: ABNORMAL
BUN BLDV-MCNC: 10 MG/DL (ref 7–22)
BUN BLDV-MCNC: 9 MG/DL (ref 7–22)
C-REACTIVE PROTEIN: 8.8 MG/DL (ref 0–1)
CALCIUM SERPL-MCNC: 7.8 MG/DL (ref 8.5–10.5)
CALCIUM SERPL-MCNC: 8.2 MG/DL (ref 8.5–10.5)
CANNABINOID QUANTITATIVE URINE: NEGATIVE
CASTS 2: ABNORMAL /LPF
CASTS UA: ABNORMAL /LPF
CHARACTER, CSF: CLEAR
CHARACTER, URINE: CLEAR
CHLORIDE BLD-SCNC: 101 MEQ/L (ref 98–111)
CHLORIDE BLD-SCNC: 93 MEQ/L (ref 98–111)
CHOLESTEROL, TOTAL: 76 MG/DL (ref 100–199)
CO2: 24 MEQ/L (ref 23–33)
CO2: 26 MEQ/L (ref 23–33)
COCAINE METABOLITE QUANTITATIVE URINE: NEGATIVE
COLOR CSF: COLORLESS
COLOR: YELLOW
CREAT SERPL-MCNC: 0.8 MG/DL (ref 0.4–1.2)
CREAT SERPL-MCNC: 1 MG/DL (ref 0.4–1.2)
CRYPTOCOCCUS NEOFORMANS/GATTI CSF FILM ARR.: NOT DETECTED
CRYSTALS, UA: ABNORMAL
CYTOMEGALOVIRUS (CMV) CSF FILM ARRAY: NOT DETECTED
EKG ATRIAL RATE: 136 BPM
EKG P AXIS: 29 DEGREES
EKG P-R INTERVAL: 122 MS
EKG Q-T INTERVAL: 290 MS
EKG QRS DURATION: 100 MS
EKG QTC CALCULATION (BAZETT): 436 MS
EKG R AXIS: 76 DEGREES
EKG T AXIS: 33 DEGREES
EKG VENTRICULAR RATE: 136 BPM
ENTEROVIRUS DETECTION PCR: NOT DETECTED
EOSINOPHIL # BLD: 0 %
EOSINOPHILS ABSOLUTE: 0 THOU/MM3 (ref 0–0.4)
EPITHELIAL CELLS, UA: ABNORMAL /HPF
ERYTHROCYTE [DISTWIDTH] IN BLOOD BY AUTOMATED COUNT: 14.6 % (ref 11.5–14.5)
ERYTHROCYTE [DISTWIDTH] IN BLOOD BY AUTOMATED COUNT: 42.9 FL (ref 35–45)
ESCHERICHIA COLI K1 CSF FILM ARRAY: NOT DETECTED
FERRITIN: 461 NG/ML (ref 22–322)
FLU A ANTIGEN: NEGATIVE
FLU B ANTIGEN: NEGATIVE
FOLATE: 6.6 NG/ML (ref 4.8–24.2)
GFR SERPL CREATININE-BSD FRML MDRD: 83 ML/MIN/1.73M2
GFR SERPL CREATININE-BSD FRML MDRD: > 90 ML/MIN/1.73M2
GLUCOSE BLD-MCNC: 121 MG/DL (ref 70–108)
GLUCOSE BLD-MCNC: 132 MG/DL (ref 70–108)
GLUCOSE BLD-MCNC: 139 MG/DL
GLUCOSE BLD-MCNC: 139 MG/DL (ref 70–108)
GLUCOSE BLD-MCNC: 141 MG/DL (ref 70–108)
GLUCOSE URINE: NEGATIVE MG/DL
GLUCOSE, CSF: 61 MG/DL (ref 40–80)
HAEMOPHILUS INFLUENZA CSF FILM ARRAY: NOT DETECTED
HCT VFR BLD CALC: 24.2 % (ref 42–52)
HDLC SERPL-MCNC: 18 MG/DL
HEMOGLOBIN: 7.8 GM/DL (ref 14–18)
HHV-6 (HERPESVIRUS 6) CSF FILM ARRAY: NOT DETECTED
HIV AG/AB: NONREACTIVE
HSV-1 CSF FILM ARRAY: NOT DETECTED
HSV-2 CSF FILM ARRAY: NOT DETECTED
IMMATURE GRANS (ABS): 0.17 THOU/MM3 (ref 0–0.07)
IMMATURE GRANULOCYTES: 1.2 %
IMMATURE RETIC FRACT: 14 % (ref 2.3–13.4)
INR BLD: 1.42 (ref 0.85–1.13)
IRON SATURATION: 6 % (ref 20–50)
IRON: 9 UG/DL (ref 65–195)
KETONES, URINE: NEGATIVE
LACTIC ACID, SEPSIS: 1 MMOL/L (ref 0.5–1.9)
LACTIC ACID, SEPSIS: 5 MMOL/L (ref 0.5–1.9)
LACTIC ACID: 1 MMOL/L (ref 0.5–2)
LACTIC ACID: 1.6 MMOL/L (ref 0.5–2)
LD: 169 U/L (ref 100–190)
LDL CHOLESTEROL CALCULATED: 47 MG/DL
LEUKOCYTE ESTERASE, URINE: NEGATIVE
LISTERIA MONOCYTOGENES CSF FILM ARRAY: NOT DETECTED
LV EF: 60 %
LVEF MODALITY: NORMAL
LYMPHOCYTES # BLD: 1.8 %
LYMPHOCYTES ABSOLUTE: 0.3 THOU/MM3 (ref 1–4.8)
LYMPHS CSF: 34 % (ref 0–90)
MAGNESIUM: 1.5 MG/DL (ref 1.6–2.4)
MAGNESIUM: 2.1 MG/DL (ref 1.6–2.4)
MCH RBC QN AUTO: 26.1 PG (ref 26–33)
MCHC RBC AUTO-ENTMCNC: 32.2 GM/DL (ref 32.2–35.5)
MCV RBC AUTO: 80.9 FL (ref 80–94)
MISCELLANEOUS 2: ABNORMAL
MONOCYTE, CSF: 11 % (ref 0–45)
MONOCYTES # BLD: 5.8 %
MONOCYTES ABSOLUTE: 0.8 THOU/MM3 (ref 0.4–1.3)
NEISSERIA MENIGITIDIS CSF FILM ARRAY: NOT DETECTED
NITRITE, URINE: NEGATIVE
NUCLEATED RED BLOOD CELLS: 0 /100 WBC
OPIATES, URINE: NEGATIVE
OSMOLALITY CALCULATION: 258 MOSMOL/KG (ref 275–300)
OXYCODONE: NEGATIVE
PARECHOVIRUS CSF FILM ARRAY: NOT DETECTED
PATHOLOGIST REVIEW: ABNORMAL
PH UA: 6.5 (ref 5–9)
PHENCYCLIDINE QUANTITATIVE URINE: NEGATIVE
PLATELET # BLD: 194 THOU/MM3 (ref 130–400)
PMV BLD AUTO: 8.6 FL (ref 9.4–12.4)
POC CREATININE WHOLE BLOOD: 1.1 MG/DL (ref 0.5–1.2)
POTASSIUM REFLEX MAGNESIUM: 3.3 MEQ/L (ref 3.5–5.2)
POTASSIUM SERPL-SCNC: 4.5 MEQ/L (ref 3.5–5.2)
PROCALCITONIN: 1.98 NG/ML (ref 0.01–0.09)
PROLACTIN: 16.6 NG/ML
PROTEIN CSF: 57 MG/DL (ref 12–60)
PROTEIN UA: NEGATIVE
RBC # BLD: 2.99 MILL/MM3 (ref 4.7–6.1)
RBC CSF: 72 /CUMM
RBC URINE: ABNORMAL /HPF
RENAL EPITHELIAL, UA: ABNORMAL
RETIC HEMOGLOBIN: 26.7 PG (ref 28.2–35.7)
RETICULOCYTE ABSOLUTE COUNT: 2 % (ref 0.5–2)
RH FACTOR: NORMAL
SARS-COV-2, NAAT: NOT  DETECTED
SEDIMENTATION RATE, ERYTHROCYTE: 38 MM/HR (ref 0–10)
SEG NEUTROPHILS: 91.1 %
SEGMENTED NEUTROPHILS ABSOLUTE COUNT: 13.2 THOU/MM3 (ref 1.8–7.7)
SEGS, CSF: 55 % (ref 0–6)
SODIUM BLD-SCNC: 128 MEQ/L (ref 135–145)
SODIUM BLD-SCNC: 134 MEQ/L (ref 135–145)
SPECIFIC GRAVITY, URINE: 1.02 (ref 1–1.03)
STREPTOCOCCUS AGALACTIAE CSF FILM ARRAY: NOT DETECTED
STREPTOCOCCUS PNEUMONIAE CSF FILM ARRAY: NOT DETECTED
TOTAL CK: 24 U/L (ref 55–170)
TOTAL IRON BINDING CAPACITY: 152 UG/DL (ref 171–450)
TOTAL NUCLEATED CELLS CSF: 27 /CUMM (ref 0–5)
TOTAL PROTEIN: 6.2 G/DL (ref 6.1–8)
TRIGL SERPL-MCNC: 54 MG/DL (ref 0–199)
TROPONIN T: 0.01 NG/ML
TUBE VOLUME RECEIVED CSF: 2 ML
UROBILINOGEN, URINE: 1 EU/DL (ref 0–1)
VARICELLA-ZOSTER, PCR: NOT DETECTED
VITAMIN B-12: 498 PG/ML (ref 211–911)
WBC # BLD: 14.5 THOU/MM3 (ref 4.8–10.8)
WBC UA: ABNORMAL /HPF
YEAST: ABNORMAL

## 2021-10-09 PROCEDURE — 009U3ZX DRAINAGE OF SPINAL CANAL, PERCUTANEOUS APPROACH, DIAGNOSTIC: ICD-10-PCS | Performed by: INTERNAL MEDICINE

## 2021-10-09 PROCEDURE — 85046 RETICYTE/HGB CONCENTRATE: CPT

## 2021-10-09 PROCEDURE — 6360000002 HC RX W HCPCS

## 2021-10-09 PROCEDURE — 93005 ELECTROCARDIOGRAM TRACING: CPT | Performed by: EMERGENCY MEDICINE

## 2021-10-09 PROCEDURE — 82746 ASSAY OF FOLIC ACID SERUM: CPT

## 2021-10-09 PROCEDURE — 99222 1ST HOSP IP/OBS MODERATE 55: CPT | Performed by: FAMILY MEDICINE

## 2021-10-09 PROCEDURE — 6360000004 HC RX CONTRAST MEDICATION

## 2021-10-09 PROCEDURE — 6370000000 HC RX 637 (ALT 250 FOR IP): Performed by: NURSE PRACTITIONER

## 2021-10-09 PROCEDURE — 83615 LACTATE (LD) (LDH) ENZYME: CPT

## 2021-10-09 PROCEDURE — 85025 COMPLETE CBC W/AUTO DIFF WBC: CPT

## 2021-10-09 PROCEDURE — 62270 DX LMBR SPI PNXR: CPT

## 2021-10-09 PROCEDURE — 70498 CT ANGIOGRAPHY NECK: CPT

## 2021-10-09 PROCEDURE — 96361 HYDRATE IV INFUSION ADD-ON: CPT

## 2021-10-09 PROCEDURE — 86140 C-REACTIVE PROTEIN: CPT

## 2021-10-09 PROCEDURE — 86038 ANTINUCLEAR ANTIBODIES: CPT

## 2021-10-09 PROCEDURE — 87804 INFLUENZA ASSAY W/OPTIC: CPT

## 2021-10-09 PROCEDURE — 87186 SC STD MICRODIL/AGAR DIL: CPT

## 2021-10-09 PROCEDURE — 84484 ASSAY OF TROPONIN QUANT: CPT

## 2021-10-09 PROCEDURE — 87389 HIV-1 AG W/HIV-1&-2 AB AG IA: CPT

## 2021-10-09 PROCEDURE — 74176 CT ABD & PELVIS W/O CONTRAST: CPT

## 2021-10-09 PROCEDURE — 83540 ASSAY OF IRON: CPT

## 2021-10-09 PROCEDURE — 87635 SARS-COV-2 COVID-19 AMP PRB: CPT

## 2021-10-09 PROCEDURE — 84157 ASSAY OF PROTEIN OTHER: CPT

## 2021-10-09 PROCEDURE — 83550 IRON BINDING TEST: CPT

## 2021-10-09 PROCEDURE — 2000000000 HC ICU R&B

## 2021-10-09 PROCEDURE — 85651 RBC SED RATE NONAUTOMATED: CPT

## 2021-10-09 PROCEDURE — 85610 PROTHROMBIN TIME: CPT

## 2021-10-09 PROCEDURE — 36415 COLL VENOUS BLD VENIPUNCTURE: CPT

## 2021-10-09 PROCEDURE — 80053 COMPREHEN METABOLIC PANEL: CPT

## 2021-10-09 PROCEDURE — 83036 HEMOGLOBIN GLYCOSYLATED A1C: CPT

## 2021-10-09 PROCEDURE — 87798 DETECT AGENT NOS DNA AMP: CPT

## 2021-10-09 PROCEDURE — 83605 ASSAY OF LACTIC ACID: CPT

## 2021-10-09 PROCEDURE — 99284 EMERGENCY DEPT VISIT MOD MDM: CPT

## 2021-10-09 PROCEDURE — 87205 SMEAR GRAM STAIN: CPT

## 2021-10-09 PROCEDURE — 87801 DETECT AGNT MULT DNA AMPLI: CPT

## 2021-10-09 PROCEDURE — 82607 VITAMIN B-12: CPT

## 2021-10-09 PROCEDURE — 87385 HISTOPLASMA CAPSUL AG IA: CPT

## 2021-10-09 PROCEDURE — 84146 ASSAY OF PROLACTIN: CPT

## 2021-10-09 PROCEDURE — 87040 BLOOD CULTURE FOR BACTERIA: CPT

## 2021-10-09 PROCEDURE — 83735 ASSAY OF MAGNESIUM: CPT

## 2021-10-09 PROCEDURE — 6370000000 HC RX 637 (ALT 250 FOR IP)

## 2021-10-09 PROCEDURE — 6360000002 HC RX W HCPCS: Performed by: EMERGENCY MEDICINE

## 2021-10-09 PROCEDURE — 82945 GLUCOSE OTHER FLUID: CPT

## 2021-10-09 PROCEDURE — 6360000004 HC RX CONTRAST MEDICATION: Performed by: EMERGENCY MEDICINE

## 2021-10-09 PROCEDURE — 96365 THER/PROPH/DIAG IV INF INIT: CPT

## 2021-10-09 PROCEDURE — 2580000003 HC RX 258: Performed by: EMERGENCY MEDICINE

## 2021-10-09 PROCEDURE — 70553 MRI BRAIN STEM W/O & W/DYE: CPT

## 2021-10-09 PROCEDURE — 82728 ASSAY OF FERRITIN: CPT

## 2021-10-09 PROCEDURE — 70496 CT ANGIOGRAPHY HEAD: CPT

## 2021-10-09 PROCEDURE — 89051 BODY FLUID CELL COUNT: CPT

## 2021-10-09 PROCEDURE — 2580000003 HC RX 258: Performed by: STUDENT IN AN ORGANIZED HEALTH CARE EDUCATION/TRAINING PROGRAM

## 2021-10-09 PROCEDURE — 82565 ASSAY OF CREATININE: CPT

## 2021-10-09 PROCEDURE — 82550 ASSAY OF CK (CPK): CPT

## 2021-10-09 PROCEDURE — 70450 CT HEAD/BRAIN W/O DYE: CPT

## 2021-10-09 PROCEDURE — A9579 GAD-BASE MR CONTRAST NOS,1ML: HCPCS

## 2021-10-09 PROCEDURE — 71045 X-RAY EXAM CHEST 1 VIEW: CPT

## 2021-10-09 PROCEDURE — 71250 CT THORAX DX C-: CPT

## 2021-10-09 PROCEDURE — 87899 AGENT NOS ASSAY W/OPTIC: CPT

## 2021-10-09 PROCEDURE — 2580000003 HC RX 258

## 2021-10-09 PROCEDURE — 85730 THROMBOPLASTIN TIME PARTIAL: CPT

## 2021-10-09 PROCEDURE — 81001 URINALYSIS AUTO W/SCOPE: CPT

## 2021-10-09 PROCEDURE — 86901 BLOOD TYPING SEROLOGIC RH(D): CPT

## 2021-10-09 PROCEDURE — 87075 CULTR BACTERIA EXCEPT BLOOD: CPT

## 2021-10-09 PROCEDURE — 6370000000 HC RX 637 (ALT 250 FOR IP): Performed by: EMERGENCY MEDICINE

## 2021-10-09 PROCEDURE — 82948 REAGENT STRIP/BLOOD GLUCOSE: CPT

## 2021-10-09 PROCEDURE — 84145 PROCALCITONIN (PCT): CPT

## 2021-10-09 PROCEDURE — 86850 RBC ANTIBODY SCREEN: CPT

## 2021-10-09 PROCEDURE — 86900 BLOOD TYPING SEROLOGIC ABO: CPT

## 2021-10-09 PROCEDURE — 80061 LIPID PANEL: CPT

## 2021-10-09 PROCEDURE — 87077 CULTURE AEROBIC IDENTIFY: CPT

## 2021-10-09 PROCEDURE — 80307 DRUG TEST PRSMV CHEM ANLYZR: CPT

## 2021-10-09 PROCEDURE — 93306 TTE W/DOPPLER COMPLETE: CPT

## 2021-10-09 RX ORDER — ACETAMINOPHEN 325 MG/1
650 TABLET ORAL EVERY 6 HOURS PRN
Status: DISCONTINUED | OUTPATIENT
Start: 2021-10-09 | End: 2021-10-14 | Stop reason: HOSPADM

## 2021-10-09 RX ORDER — POTASSIUM CHLORIDE 7.45 MG/ML
10 INJECTION INTRAVENOUS
Status: DISCONTINUED | OUTPATIENT
Start: 2021-10-09 | End: 2021-10-09

## 2021-10-09 RX ORDER — 0.9 % SODIUM CHLORIDE 0.9 %
1000 INTRAVENOUS SOLUTION INTRAVENOUS ONCE
Status: COMPLETED | OUTPATIENT
Start: 2021-10-09 | End: 2021-10-09

## 2021-10-09 RX ORDER — POTASSIUM CHLORIDE 7.45 MG/ML
10 INJECTION INTRAVENOUS PRN
Status: DISCONTINUED | OUTPATIENT
Start: 2021-10-09 | End: 2021-10-14 | Stop reason: HOSPADM

## 2021-10-09 RX ORDER — LOSARTAN POTASSIUM 100 MG/1
100 TABLET ORAL DAILY
Status: DISCONTINUED | OUTPATIENT
Start: 2021-10-09 | End: 2021-10-14 | Stop reason: HOSPADM

## 2021-10-09 RX ORDER — SODIUM CHLORIDE 9 MG/ML
25 INJECTION, SOLUTION INTRAVENOUS PRN
Status: DISCONTINUED | OUTPATIENT
Start: 2021-10-09 | End: 2021-10-14 | Stop reason: HOSPADM

## 2021-10-09 RX ORDER — SODIUM CHLORIDE 0.9 % (FLUSH) 0.9 %
5-40 SYRINGE (ML) INJECTION EVERY 12 HOURS SCHEDULED
Status: DISCONTINUED | OUTPATIENT
Start: 2021-10-09 | End: 2021-10-09

## 2021-10-09 RX ORDER — METOPROLOL SUCCINATE 100 MG/1
100 TABLET, EXTENDED RELEASE ORAL DAILY
Status: DISCONTINUED | OUTPATIENT
Start: 2021-10-09 | End: 2021-10-14 | Stop reason: HOSPADM

## 2021-10-09 RX ORDER — SODIUM CHLORIDE 0.9 % (FLUSH) 0.9 %
5-40 SYRINGE (ML) INJECTION EVERY 12 HOURS SCHEDULED
Status: DISCONTINUED | OUTPATIENT
Start: 2021-10-09 | End: 2021-10-09 | Stop reason: SDUPTHER

## 2021-10-09 RX ORDER — ASPIRIN 81 MG/1
81 TABLET, CHEWABLE ORAL DAILY
Status: DISCONTINUED | OUTPATIENT
Start: 2021-10-10 | End: 2021-10-14 | Stop reason: HOSPADM

## 2021-10-09 RX ORDER — ACETAMINOPHEN 650 MG/1
650 SUPPOSITORY RECTAL EVERY 6 HOURS PRN
Status: DISCONTINUED | OUTPATIENT
Start: 2021-10-09 | End: 2021-10-14 | Stop reason: HOSPADM

## 2021-10-09 RX ORDER — MAGNESIUM SULFATE IN WATER 40 MG/ML
2000 INJECTION, SOLUTION INTRAVENOUS PRN
Status: DISCONTINUED | OUTPATIENT
Start: 2021-10-09 | End: 2021-10-14 | Stop reason: HOSPADM

## 2021-10-09 RX ORDER — HYDRALAZINE HYDROCHLORIDE 50 MG/1
50 TABLET, FILM COATED ORAL 2 TIMES DAILY
Status: DISCONTINUED | OUTPATIENT
Start: 2021-10-09 | End: 2021-10-14 | Stop reason: HOSPADM

## 2021-10-09 RX ORDER — SODIUM CHLORIDE 9 MG/ML
INJECTION, SOLUTION INTRAVENOUS CONTINUOUS
Status: DISCONTINUED | OUTPATIENT
Start: 2021-10-09 | End: 2021-10-09

## 2021-10-09 RX ORDER — SODIUM CHLORIDE 0.9 % (FLUSH) 0.9 %
5-40 SYRINGE (ML) INJECTION PRN
Status: DISCONTINUED | OUTPATIENT
Start: 2021-10-09 | End: 2021-10-09 | Stop reason: SDUPTHER

## 2021-10-09 RX ORDER — SODIUM CHLORIDE 0.9 % (FLUSH) 0.9 %
5-40 SYRINGE (ML) INJECTION EVERY 12 HOURS SCHEDULED
Status: DISCONTINUED | OUTPATIENT
Start: 2021-10-09 | End: 2021-10-14 | Stop reason: HOSPADM

## 2021-10-09 RX ORDER — SODIUM CHLORIDE 9 MG/ML
INJECTION, SOLUTION INTRAVENOUS PRN
Status: DISCONTINUED | OUTPATIENT
Start: 2021-10-09 | End: 2021-10-14 | Stop reason: HOSPADM

## 2021-10-09 RX ORDER — 0.9 % SODIUM CHLORIDE 0.9 %
50 INTRAVENOUS SOLUTION INTRAVENOUS ONCE
Status: DISCONTINUED | OUTPATIENT
Start: 2021-10-09 | End: 2021-10-09

## 2021-10-09 RX ORDER — AMLODIPINE BESYLATE 5 MG/1
5 TABLET ORAL DAILY
Status: DISCONTINUED | OUTPATIENT
Start: 2021-10-09 | End: 2021-10-14 | Stop reason: HOSPADM

## 2021-10-09 RX ORDER — MAGNESIUM SULFATE IN WATER 40 MG/ML
2000 INJECTION, SOLUTION INTRAVENOUS ONCE
Status: COMPLETED | OUTPATIENT
Start: 2021-10-09 | End: 2021-10-09

## 2021-10-09 RX ORDER — DEXTROSE MONOHYDRATE 25 G/50ML
12.5 INJECTION, SOLUTION INTRAVENOUS
Status: ACTIVE | OUTPATIENT
Start: 2021-10-09 | End: 2021-10-09

## 2021-10-09 RX ORDER — POTASSIUM CHLORIDE 20 MEQ/1
40 TABLET, EXTENDED RELEASE ORAL ONCE
Status: COMPLETED | OUTPATIENT
Start: 2021-10-09 | End: 2021-10-09

## 2021-10-09 RX ORDER — SODIUM CHLORIDE 0.9 % (FLUSH) 0.9 %
5-40 SYRINGE (ML) INJECTION PRN
Status: DISCONTINUED | OUTPATIENT
Start: 2021-10-09 | End: 2021-10-14 | Stop reason: HOSPADM

## 2021-10-09 RX ORDER — SODIUM CHLORIDE 9 MG/ML
25 INJECTION, SOLUTION INTRAVENOUS PRN
Status: DISCONTINUED | OUTPATIENT
Start: 2021-10-09 | End: 2021-10-09 | Stop reason: SDUPTHER

## 2021-10-09 RX ORDER — SODIUM CHLORIDE, SODIUM LACTATE, POTASSIUM CHLORIDE, CALCIUM CHLORIDE 600; 310; 30; 20 MG/100ML; MG/100ML; MG/100ML; MG/100ML
INJECTION, SOLUTION INTRAVENOUS CONTINUOUS
Status: DISCONTINUED | OUTPATIENT
Start: 2021-10-09 | End: 2021-10-10

## 2021-10-09 RX ORDER — POTASSIUM CHLORIDE 20 MEQ/1
40 TABLET, EXTENDED RELEASE ORAL PRN
Status: DISCONTINUED | OUTPATIENT
Start: 2021-10-09 | End: 2021-10-14 | Stop reason: HOSPADM

## 2021-10-09 RX ORDER — SODIUM CHLORIDE 9 MG/ML
25 INJECTION, SOLUTION INTRAVENOUS PRN
Status: DISCONTINUED | OUTPATIENT
Start: 2021-10-09 | End: 2021-10-09

## 2021-10-09 RX ORDER — 0.9 % SODIUM CHLORIDE 0.9 %
30 INTRAVENOUS SOLUTION INTRAVENOUS ONCE
Status: COMPLETED | OUTPATIENT
Start: 2021-10-09 | End: 2021-10-09

## 2021-10-09 RX ORDER — DOPAMINE HYDROCHLORIDE 160 MG/100ML
2-20 INJECTION, SOLUTION INTRAVENOUS CONTINUOUS
Status: CANCELLED | OUTPATIENT
Start: 2021-10-09

## 2021-10-09 RX ADMIN — IOPAMIDOL 80 ML: 755 INJECTION, SOLUTION INTRAVENOUS at 01:59

## 2021-10-09 RX ADMIN — SODIUM CHLORIDE, POTASSIUM CHLORIDE, SODIUM LACTATE AND CALCIUM CHLORIDE: 600; 310; 30; 20 INJECTION, SOLUTION INTRAVENOUS at 14:34

## 2021-10-09 RX ADMIN — ASPIRIN 325 MG: 325 TABLET, COATED ORAL at 14:41

## 2021-10-09 RX ADMIN — GADOTERIDOL 15 ML: 279.3 INJECTION, SOLUTION INTRAVENOUS at 09:41

## 2021-10-09 RX ADMIN — SODIUM CHLORIDE 1000 ML: 9 INJECTION, SOLUTION INTRAVENOUS at 02:15

## 2021-10-09 RX ADMIN — POTASSIUM CHLORIDE 40 MEQ: 1500 TABLET, EXTENDED RELEASE ORAL at 06:27

## 2021-10-09 RX ADMIN — VANCOMYCIN HYDROCHLORIDE 1000 MG: 1 INJECTION, POWDER, LYOPHILIZED, FOR SOLUTION INTRAVENOUS at 17:56

## 2021-10-09 RX ADMIN — SODIUM CHLORIDE, POTASSIUM CHLORIDE, SODIUM LACTATE AND CALCIUM CHLORIDE: 600; 310; 30; 20 INJECTION, SOLUTION INTRAVENOUS at 06:20

## 2021-10-09 RX ADMIN — PIPERACILLIN AND TAZOBACTAM 3375 MG: 3; .375 INJECTION, POWDER, LYOPHILIZED, FOR SOLUTION INTRAVENOUS at 17:56

## 2021-10-09 RX ADMIN — MAGNESIUM SULFATE HEPTAHYDRATE 2000 MG: 40 INJECTION, SOLUTION INTRAVENOUS at 10:11

## 2021-10-09 RX ADMIN — ACETAMINOPHEN 650 MG: 325 TABLET ORAL at 20:24

## 2021-10-09 RX ADMIN — SODIUM CHLORIDE 1000 ML: 9 INJECTION, SOLUTION INTRAVENOUS at 15:21

## 2021-10-09 RX ADMIN — ENOXAPARIN SODIUM 40 MG: 40 INJECTION SUBCUTANEOUS at 10:11

## 2021-10-09 RX ADMIN — SODIUM CHLORIDE 2328 ML: 9 INJECTION, SOLUTION INTRAVENOUS at 03:04

## 2021-10-09 RX ADMIN — VANCOMYCIN HYDROCHLORIDE 1000 MG: 1 INJECTION, POWDER, LYOPHILIZED, FOR SOLUTION INTRAVENOUS at 06:20

## 2021-10-09 RX ADMIN — PIPERACILLIN AND TAZOBACTAM 3375 MG: 3; .375 INJECTION, POWDER, LYOPHILIZED, FOR SOLUTION INTRAVENOUS at 10:11

## 2021-10-09 RX ADMIN — SODIUM CHLORIDE, PRESERVATIVE FREE 10 ML: 5 INJECTION INTRAVENOUS at 10:12

## 2021-10-09 RX ADMIN — CEFTRIAXONE SODIUM 2000 MG: 2 INJECTION, POWDER, FOR SOLUTION INTRAMUSCULAR; INTRAVENOUS at 04:50

## 2021-10-09 ASSESSMENT — ENCOUNTER SYMPTOMS
EYE REDNESS: 0
VOMITING: 0
NAUSEA: 0
SHORTNESS OF BREATH: 0
CONSTIPATION: 0
VOMITING: 1
SORE THROAT: 0
EYE PAIN: 0
PHOTOPHOBIA: 0
CHEST TIGHTNESS: 0
EYE DISCHARGE: 0
DIARRHEA: 0
ABDOMINAL PAIN: 0
GASTROINTESTINAL NEGATIVE: 1
EYE ITCHING: 0
COUGH: 0
RESPIRATORY NEGATIVE: 1
STRIDOR: 0
ABDOMINAL DISTENTION: 0
BACK PAIN: 0
NAUSEA: 1
RHINORRHEA: 0
WHEEZING: 0

## 2021-10-09 ASSESSMENT — PAIN SCALES - GENERAL
PAINLEVEL_OUTOF10: 0
PAINLEVEL_OUTOF10: 0

## 2021-10-09 NOTE — PROGRESS NOTES
Discussed echo prelim with Dr Jerrell Billings. There may be mitral valve vegetation. He has developed pulmonary edema. Will move to ICU for inotropic support and diuresis. Will discuss with surgeon if echo findings true. He has systolic murmur URSB and to/fro murmur LSB.

## 2021-10-09 NOTE — PROGRESS NOTES
Pharmacy Note  Vancomycin Consult    Anh Jane is a 43 y.o. male started on Vancomycin for SIRS of unknown etiology; consult received from Dr. Stephane Perez to manage therapy. Also receiving the following antibiotics: Zosyn. Patient Active Problem List   Diagnosis    Migraine headache with aura    Systolic murmur    H/O coarctation of aorta, repair at 12 months old    Sepsis ([address]     Allergies:  Patient has no known allergies. Temp max: 102.6    Recent Labs     10/09/21  0205   BUN 10   CREATININE 1.0   WBC 14.5*       Intake/Output Summary (Last 24 hours) at 10/9/2021 0739  Last data filed at 10/9/2021 0603  Gross per 24 hour   Intake --   Output 1100 ml   Net -1100 ml     Culture Date Source Results   10/9/21 Sputum Sent   10/9/21 Meningitis panel Negative   10/9/21 Blood x 2 Sent   10/9/21 Pneumonia panel Sent   10/9/21 CSF culture Sent       Ht Readings from Last 1 Encounters:   10/09/21 6' 2\" (1.88 m)        Wt Readings from Last 1 Encounters:   10/09/21 163 lb (73.9 kg)       Body mass index is 20.93 kg/m². Estimated Creatinine Clearance: 105 mL/min (based on SCr of 1 mg/dL). Goal AUC Level: 400-600 mcg/mL    Assessment/Plan:  Will initiate Vancomycin with a one time loading dose of 1000 mg x1 (received in emergency department), followed by 1000 mg IV every 12 hours. Timing of trough level will be determined based on culture results, renal function, and clinical response. Thank you for the consult. Will continue to follow.   Rashaun Lazcano, KatelynD, BCPS  10/9/2021  7:47 AM

## 2021-10-09 NOTE — ED NOTES
ED to inpatient nurses report    Chief Complaint   Patient presents with    Extremity Weakness    Numbness      Present to ED from home  LOC: alert and orientated to name, place, date  Vital signs   Vitals:    10/09/21 0334 10/09/21 0400 10/09/21 0430 10/09/21 0457   BP: (!) 96/53 82/65 (!) 86/48 (!) 93/52   Pulse: 113 95 98 97   Resp: 16 16  16   Temp:    98.5 °F (36.9 °C)   TempSrc:    Oral   SpO2: 95% 95%  94%   Weight:       Height:          Oxygen Baseline: Room Air      LDAs:   Peripheral IV 10/09/21 Right Antecubital (Active)       Peripheral IV 10/09/21 Left Hand (Active)     Mobility: Independent    Present condition: stable     Electronically signed by Jayden Ochoa RN on 10/9/2021 at 5:57 AM     Jayden Ochoa RN  10/09/21 6510

## 2021-10-09 NOTE — H&P
[place] [id number] MEDICINE    HISTORY AND PHYSICAL EXAM    PATIENT NAME:  Gianfranco Sanchez    MRN:  [id number]  SERVICE DATE:  10/9/2021   SERVICE TIME:  5:40 AM    Primary Care Physician: BARBARA Crum CNP     SUBJECTIVE  CHIEF COMPLAINT:  Altered mental status    HPI:  Gianfranco Sanchez is a 43 y.o., , male who  has a past medical history of Hypertension. that is hospitalized for altered mental status. The patient reports that he took a recent trip to [place] for a  back in March, and since then he has noticed that he has been having extensive nausea after eating foods and a 45 pound weight loss. At about 10 PM last night the patient had an episode of vomiting, later went back to sleep, and then had another episode of vomiting at 12 AM. At about 1:30 AM the patient woke up and noticed that he couldn't talk and felt numb. He was also feeling feverish. He was having trouble putting together words, and when he got up to walk around he had a mild fall but didn't hit his head. The patient has also been complaining of nights sweats that he has noticed since the trip in March. The patient didn't take any OTC medications for his symptoms. He denies any diarrhea, constipation, recent exposure to sick contacts, loss of strength, or weakness. In the ED the patient's vitals were significant for T 102.6 Resp 16 Pulse 125 /49 and SpO2 96. The patient's labs were significant for sodium 128, potassium 3.3, chloride 93, magnesium 1.5, glucose 132, calcium 8.2, osmolality 258, procalcitonin 1.98, CK 24, Troponin 0.010, albumin 3.0, glucose 132, and hemoglobin 7.8. In the ED the patient had a CT head done that was negative, a CTA of the head and neck that was negative, x-ray of chest that was negative, and EKG that showed sinus tachycardia. NIH stroke scale score was 3. The patient also had workup done for meningitis that was negative and was placed on antibiotics.           PAST MEDICAL HISTORY:    Past Medical History:   Diagnosis Date    Hypertension      PAST SURGICAL HISTORY:    Past Surgical History:   Procedure Laterality Date    CARDIAC CATHETERIZATION      CARDIAC SURGERY      COARCTATION OF AORTA REPAIR  06/1984     FAMILY HISTORY:    Family History   Problem Relation Age of Onset    High Blood Pressure Mother     Heart Disease Mother     Other Father         Not health related     SOCIAL HISTORY:    Social History     Socioeconomic History    Marital status: Single     Spouse name: Not on file    Number of children: 3    Years of education: 15    Highest education level: Associate degree: occupational, technical, or vocational program   Occupational History    Not on file   Tobacco Use    Smoking status: Never Smoker    Smokeless tobacco: Never Used   Substance and Sexual Activity    Alcohol use: Yes     Alcohol/week: 3.0 standard drinks     Types: 3 Cans of beer per week     Comment: rare 3 times a year    Drug use: No    Sexual activity: Yes     Partners: Female   Other Topics Concern    Not on file   Social History Narrative    Not on file     Social Determinants of Health     Financial Resource Strain: Low Risk     Difficulty of Paying Living Expenses: Not hard at all   Food Insecurity: No Food Insecurity    Worried About [address] in the Last Year: Never true    [address]  in the Last Year: Never true   Transportation Needs:     Lack of Transportation (Medical):      Lack of Transportation (Non-Medical):    Physical Activity:     Days of Exercise per Week:     Minutes of Exercise per Session:    Stress:     Feeling of Stress :    Social Connections:     Frequency of Communication with Friends and Family:     Frequency of Social Gatherings with Friends and Family:     Attends Spiritism Services:     Active Member of Clubs or Organizations:     Attends Club or Organization Meetings:     Marital Status:    Intimate Partner Violence:     Fear of Current or Ex-Partner:     Emotionally Abused:     Physically Abused:     Sexually Abused:      MEDICATIONS:   Prior to Admission medications    Medication Sig Start Date End Date Taking? Authorizing Provider   losartan (COZAAR) 100 MG tablet Take 1 tablet by mouth daily 8/11/21   Isabel Terrazas MD   hydrALAZINE (APRESOLINE) 50 MG tablet Take 1 tablet by mouth 2 times daily 8/11/21   Isabel Terrazas MD   amLODIPine (NORVASC) 5 MG tablet Take 1 tablet by mouth daily 8/11/21   Isabel Terrazas MD   metoprolol succinate (TOPROL XL) 100 MG extended release tablet Take 1 tablet by mouth daily 8/11/21   Isabel Terrazas MD       ALLERGIES: Patient has no known allergies. REVIEW OF SYSTEM:   Review of Systems   Constitutional: Positive for appetite change, chills, diaphoresis, fatigue, fever and unexpected weight change. HENT: Negative. Eyes: Positive for visual disturbance. Respiratory: Negative. Cardiovascular: Negative. Gastrointestinal: Negative. Endocrine: Negative. Musculoskeletal: Negative. Skin: Negative. Neurological: Positive for facial asymmetry, speech difficulty, weakness and numbness. Psychiatric/Behavioral: Negative. OBJECTIVE  PHYSICAL EXAM:   Physical Exam  Constitutional:       Appearance: Normal appearance. HENT:      Head: Normocephalic and atraumatic. Nose: Nose normal.      Mouth/Throat:      Pharynx: Oropharynx is clear. Eyes:      Extraocular Movements: Extraocular movements intact. Cardiovascular:      Rate and Rhythm: Regular rhythm. Tachycardia present. Pulses: Normal pulses. Heart sounds: Normal heart sounds. Pulmonary:      Effort: Pulmonary effort is normal.      Breath sounds: Normal breath sounds. Abdominal:      Palpations: Abdomen is soft. Musculoskeletal:         General: Normal range of motion. Cervical back: Normal range of motion. Skin:     General: Skin is dry.       Capillary Refill: Capillary refill takes 2 to 3 seconds. Neurological:      General: No focal deficit present. Mental Status: He is alert. Psychiatric:         Mood and Affect: Mood normal.          BP (!) 93/52   Pulse 97   Temp 98.5 °F (36.9 °C) (Oral)   Resp 16   Ht 6' 2\" (1.88 m)   Wt 171 lb (77.6 kg)   SpO2 94%   BMI 21.96 kg/m²     DATA:     Diagnostic tests reviewed for today's visit:    Most recent labs and imaging results reviewed.      LABS:    Recent Results (from the past 24 hour(s))   EKG 12 Lead    Collection Time: 10/09/21  1:36 AM   Result Value Ref Range    Ventricular Rate 136 BPM    Atrial Rate 136 BPM    P-R Interval 122 ms    QRS Duration 100 ms    Q-T Interval 290 ms    QTc Calculation (Bazett) 436 ms    P Axis 29 degrees    R Axis 76 degrees    T Axis 33 degrees   POCT Glucose    Collection Time: 10/09/21  1:46 AM   Result Value Ref Range    Glucose 139 mg/dL   POCT Creatinine    Collection Time: 10/09/21  1:55 AM   Result Value Ref Range    POC CREATININE WHOLE BLOOD 1.1 0.5 - 1.2 mg/dl   CBC Auto Differential    Collection Time: 10/09/21  2:05 AM   Result Value Ref Range    WBC 14.5 (H) 4.8 - 10.8 thou/mm3    RBC 2.99 (L) 4.70 - 6.10 mill/mm3    Hemoglobin 7.8 (L) 14.0 - 18.0 gm/dl    Hematocrit 24.2 (L) 42.0 - 52.0 %    MCV 80.9 80.0 - 94.0 fL    MCH 26.1 26.0 - 33.0 pg    MCHC 32.2 32.2 - 35.5 gm/dl    RDW-CV 14.6 (H) 11.5 - 14.5 %    RDW-SD 42.9 35.0 - 45.0 fL    Platelets 935 872 - 937 thou/mm3    MPV 8.6 (L) 9.4 - 12.4 fL    Seg Neutrophils 91.1 %    Lymphocytes 1.8 %    Monocytes 5.8 %    Eosinophils 0.0 %    Basophils 0.1 %    Immature Granulocytes 1.2 %    Segs Absolute 13.2 (H) 1 - 7 thou/mm3    Lymphocytes Absolute 0.3 (L) 1.0 - 4.8 thou/mm3    Monocytes Absolute 0.8 0.4 - 1.3 thou/mm3    Eosinophils Absolute 0.0 0.0 - 0.4 thou/mm3    Basophils Absolute 0.0 0.0 - 0.1 thou/mm3    Immature Grans (Abs) 0.17 (H) 0.00 - 0.07 thou/mm3    nRBC 0 /100 wbc   Comprehensive Metabolic Panel w/ Reflex to MG Collection Time: 10/09/21  2:05 AM   Result Value Ref Range    Glucose 132 (H) 70 - 108 mg/dL    CREATININE 1.0 0.4 - 1.2 mg/dL    BUN 10 7 - 22 mg/dL    Sodium 128 (L) 135 - 145 meq/L    Potassium reflex Magnesium 3.3 (L) 3.5 - 5.2 meq/L    Chloride 93 (L) 98 - 111 meq/L    CO2 24 23 - 33 meq/L    Calcium 8.2 (L) 8.5 - 10.5 mg/dL    AST 15 5 - 40 U/L    Alkaline Phosphatase 72 38 - 126 U/L    Total Protein 6.2 6.1 - 8.0 g/dL    Albumin 3.0 (L) 3.5 - 5.1 g/dL    Total Bilirubin 1.0 0.3 - 1.2 mg/dL    ALT 14 11 - 66 U/L   Troponin    Collection Time: 10/09/21  2:05 AM   Result Value Ref Range    Troponin T 0.010 (A) ng/ml   Prolactin    Collection Time: 10/09/21  2:05 AM   Result Value Ref Range    Prolactin 16.6 ng/ml   Anion Gap    Collection Time: 10/09/21  2:05 AM   Result Value Ref Range    Anion Gap 11.0 8.0 - 16.0 meq/L   Glomerular Filtration Rate, Estimated    Collection Time: 10/09/21  2:05 AM   Result Value Ref Range    Est, Glom Filt Rate 83 (A) ml/min/1.73m2   Magnesium    Collection Time: 10/09/21  2:05 AM   Result Value Ref Range    Magnesium 1.5 (L) 1.6 - 2.4 mg/dL   Osmolality    Collection Time: 10/09/21  2:05 AM   Result Value Ref Range    Osmolality Calc 258.0 (L) 275.0 - 300.0 mOsmol/kg   Procalcitonin    Collection Time: 10/09/21  2:05 AM   Result Value Ref Range    Procalcitonin 1.98 (H) 0.01 - 0.09 ng/mL   CK    Collection Time: 10/09/21  2:05 AM   Result Value Ref Range    Total CK 24 (L) 55 - 170 U/L   Protime-INR    Collection Time: 10/09/21  2:30 AM   Result Value Ref Range    INR 1.42 (H) 0.85 - 1.13   APTT    Collection Time: 10/09/21  2:30 AM   Result Value Ref Range    aPTT 23.3 22.0 - 38.0 seconds   Lactic acid, plasma    Collection Time: 10/09/21  2:40 AM   Result Value Ref Range    Lactic Acid 1.0 0.5 - 2.0 mmol/L   Rapid influenza A/B antigens    Collection Time: 10/09/21  2:58 AM    Specimen: Nasopharyngeal   Result Value Ref Range    Flu A Antigen Negative NEGATIVE    Flu B Antigen Negative NEGATIVE   COVID-19, Rapid    Collection Time: 10/09/21  2:58 AM    Specimen: Nasopharyngeal Swab   Result Value Ref Range    SARS-CoV-2, NAAT NOT  DETECTED NOT DETECTED   Urine Drug Screen    Collection Time: 10/09/21  3:23 AM   Result Value Ref Range    AMPHETAMINE+METHAMPHETAMINE URINE SCREEN Negative NEGATIVE    Barbiturate Quant, Ur Negative NEGATIVE    Benzodiazepine Quant, Ur Negative NEGATIVE    Cannabinoid Quant, Ur Negative NEGATIVE    Cocaine Metab Quant, Ur Negative NEGATIVE    Opiates, Urine Negative NEGATIVE    Oxycodone Negative NEGATIVE    PCP Quant, Ur Negative NEGATIVE   Urine with Reflexed Micro    Collection Time: 10/09/21  3:23 AM   Result Value Ref Range    Glucose, Ur NEGATIVE NEGATIVE mg/dl    Bilirubin Urine NEGATIVE NEGATIVE    Ketones, Urine NEGATIVE NEGATIVE    Specific Gravity, Urine 1.021 1.002 - 1.030    Blood, Urine SMALL (A) NEGATIVE    pH, UA 6.5 5.0 - 9.0    Protein, UA NEGATIVE NEGATIVE    Urobilinogen, Urine 1.0 0.0 - 1.0 eu/dl    Nitrite, Urine NEGATIVE NEGATIVE    Leukocyte Esterase, Urine NEGATIVE NEGATIVE    Color, UA YELLOW STRAW-YELLOW    Character, Urine CLEAR CLEAR-SL CLOUD    RBC, UA 5-10 0-2/hpf /hpf    WBC, UA 0-2 0-4/hpf /hpf    Epithelial Cells, UA NONE SEEN 3-5/hpf /hpf    Bacteria, UA NONE SEEN FEW/NONE SEEN /hpf    Casts UA NONE SEEN NONE SEEN /lpf    Crystals, UA NONE SEEN NONE SEEN    Renal Epithelial, UA NONE SEEN NONE SEEN    Yeast, UA NONE SEEN NONE SEEN    CASTS 2 NONE SEEN NONE SEEN /lpf    MISCELLANEOUS 2 NONE SEEN    Glucose CSF    Collection Time: 10/09/21  3:52 AM   Result Value Ref Range    Glucose, CSF 61 40 - 80 mg/dl   Protein CSF    Collection Time: 10/09/21  3:52 AM   Result Value Ref Range    Protein, CSF 57 12 - 60 mg/dl       IMAGING:  CT ABDOMEN PELVIS WO CONTRAST Additional Contrast? None    Result Date: 10/9/2021  Exam: CT Abdomen and Pelvis Without IV Contrast Comparison: None Findings: Bilateral basilar interstitial pulmonary edema and posterior costophrenic sulcus atelectasis  The liver density is homogeneous  No biliary abnormalities  The spleen is normal in size  No pancreatic ductal dilatation  No hydronephrosis. Delayed contrast imaging shows no urinary tract obstruction. Normal bowel caliber  The appendix is not visualized  No free fluid/free air  No vascular abnormalities. No adenopathy  Bladder outline is smooth. There is degenerative narrowing of the L5-S1 disc space Impression  Unremarkable CT of the abdomen and pelvis. This document has been electronically signed by: Elizabeth Alas MD on 10/09/2021 05:18 AM All CTs at this facility use dose modulation techniques and iterative reconstructions, and/or weight-based dosing when appropriate to reduce radiation to a low as reasonably achievable. CTA HEAD W WO CONTRAST (CODE STROKE)    Result Date: 10/9/2021  CTA HEAD, bolus contrast injection. 3D reconstructions and MPRs[de-identified] Comparison: None Right Carotid Siphon: Normal Right Anterior Cerebral Artery: A1 and A2 segments are unremarkable. There is peripheral cortical enhancement. Right Middle Cerebral Artery: M1 and M2 segments are unremarkable. There is peripheral cortical enhancement. Right Posterior Cerebral Artery: P1 and P2 segments are unremarkable. There is peripheral enhancement Left Carotid Siphon: Normal Left Anterior Cerebral Artery: A1 and A2 segments are unremarkable. There is peripheral cortical enhancement. Left Middle Cerebral Artery: M1 and M2 segments are unremarkable. There is peripheral cortical enhancement. Left Posterior Cerebral Artery: P1 and P2 segments are unremarkable. There is peripheral cortical enhancement. Basilar Artery: Normal Venous drainage:Normal     Impression: No signs of aneurysm. No signs of arterial venous malformation. No high grade stenosis or vessel cut off.  This document has been electronically signed by: Elizabeth Alas MD on 10/09/2021 02:56 AM All CTs at this facility use dose modulation techniques and iterative reconstructions, and/or weight-based dosing when appropriate to reduce radiation to a low as reasonably achievable. CT HEAD WO CONTRAST    Result Date: 10/9/2021  CT Head Without Contrast: Comparison: None. Findings: Cortical Sulci symmetric. Basal ganglia are unremarkable Mass effect: No shift in midline structures Intracranial bleeding: No intraparenchymal bleeding or abnormal extra axial blood fluid collections Pituitary: Normal in size Visualized sinuses: Clear Orbital structures: Unremarkable Calvarium: No depressed fractures. Impression:  Unremarkable CT of the head. ASPECTS score 10, NORMAL This document has been electronically signed by: Silas Bell MD on 10/09/2021 02:17 AM All CTs at this facility use dose modulation techniques and iterative reconstructions, and/or weight-based dosing when appropriate to reduce radiation to a low as reasonably achievable. CTA NECK W WO CONTRAST (CODE STROKE)    Result Date: 10/9/2021  CTA Neck , Bolus contrast injection, 3D reconstructions and MPRs: Comparison: Findings: Soft tissues of the neck are unremarkable. Superior aorta and branch vessels are unremarkable. Right carotid: No stenosis (NASCET) Right vertebral: No stenosis Left Carotid: No stenosis (NASCET) Left Vertebral: No stenosis     Impression: No aneurysm or dissection. No stenosis This document has been electronically signed by: Silas Bell MD on 10/09/2021 03:05 AM All CTs at this facility use dose modulation techniques and iterative reconstructions, and/or weight-based dosing when appropriate to reduce radiation to a low as reasonably achievable. Carotid stenosis and measurements are in accordance with NASCET criteria.     CT CHEST WO CONTRAST    Result Date: 10/9/2021  CT Chest without IV contrast: Comparison: 08/21/2018 Findings: Heart size is mildly enlarged with no pericardial effusion Aorta diameter is normal. Pulmonary artery diameter is unremarkable. Lymph nodes: No adenopathy. Trachea and Esophagus: Unremarkable. No pneumomediastinum Lungs: There is mild posterior bilateral lower lobe atelectasis and gravity dependent edema with no focal consolidation Pleura: [No pleural effusion. Skeletal: No fractures. Below the diaphragm: Regional visceral organs are unremarkable. Impression: Mild posterior lower lobe atelectasis and posterior gravity dependent edema. No focal consolidation or pleural effusion. Heart size is mildly enlarged. There is mild pulmonary vascular congestion and interstitial edema and peripheral septal lines best visualized posteriorly in the sagittal projection This document has been electronically signed by: Kari Ormond, MD on 10/09/2021 05:12 AM All CTs at this facility use dose modulation techniques and iterative reconstructions, and/or weight-based dosing when appropriate to reduce radiation to a low as reasonably achievable. XR CHEST PORTABLE    Result Date: 10/9/2021  1 view chest x-ray. Comparison: 11/04/2016 plain films, CT of the chest 08/05/2021. Findings: No consolidation or effusion. Heart size normal. No acute fracture. Impression: Chronic bilateral central bronchial large airway inflammatory thickening, otherwise normal This document has been electronically signed by: Kari Ormond, MD on 10/09/2021 03:16 AM      VTE Prophylaxis: lovenox 40 mg    ASSESSMENT AND PLAN  Active Problems:  1.  Sepsis with 3/4 SIRS criteria with unknown source at this time  -On admission patient was tachycardic (), febrile (102.6), and had leukocytosis (14.5)  -Procalcitonin was elevated at 1.98   -Lactate was normal  -Chest x-ray was normal  -Patient was given 30 mL bolus of NS in the ED  -Patient was started on  mL/hr.   -Will continue to monitor white count daily  -Pneumonia panel PCR ordered and pending  -Patient was started on broad spectrum coverage for antibiotics: IV zosyn at 12.5 mL/hr and vancomycin 166.7 mL/hr.    -Pharmacy to dose vancomycin  -Ordered blood culturesX2  -Ordered fungus culture  -Ordered respiratory culture  -Patient currently on aspiration precautions    2. Acute encephalopathy  -Differential/etiology includes infectious vs metabolic  -Patient was febrile (102.6) on admission to the ED  -Patient was originally worked up for possible stroke in the ED  -CT of the head was negative  -CTA of the head and neck was negative  -TPA was not given  -Ordered an MRI of the brain to look for signs of encephalitis or infiltrative disorders  -Ordered CRP and ESR  -Patient had a lumbar puncture done in the ED to rule out possible meningitis that was negative  -Overall meningitis panel negative  -Urine drug screen was negative  -Fall precautions  -PT/OT evaluation appreciated  -Speech language pathology evaluation appreciated  -Patient NPO at this time pending further workup    3. History of essential hypertension  -Patient is currently hypotensive  -Holding antihypertensives at this time  -Continue  mL/hr.     4. Hypo-osmolar hyponatremia  -Sodium was 128  -Continue to monitor with daily BMP    5. Hypokalemia likely secondary to poor oral intake and vomiting  -Potassium replacement protocol  -Will continue to check potassium    6. Hypomagnesemia likely secondary to poor oral intake and vomiting  -Magnesium replacement protocol  -Suspect repletion of magnesium may have helpful effect on repletion of potassium    7. Hypocalcemia likely secondary to poor oral intake and vomiting  -Calcium replacement protocol    8. Elevated troponinX3  -Likely due to stress reaction from encephalopathy and sepsis  -Continue to monitor    9.  Normocytic anemia  -Patient has had extensive drop in hemoglobin from 15.0 back in 2016 to 7.8 now  -No current source of bleeding at this time  -Will monitor H and H and get daily CBC with auto differential  -Ordered further iron studies including ferritin, vitamin B12, folate, and iron saturation and reticulocyte count               Plan of care discussed with: patient and his girlfriend    SIGNATURE: Mu Rosas DO  DATE: October 9, 2021  TIME: 5:40 Califon Degree, MD  - supervising physician

## 2021-10-09 NOTE — PROGRESS NOTES
RN noted fluctuating speech transiently; pt examined. Speech is clear with no focal motor deficits      MRI noted with concern for septic emboli; will try to get echo done today and Cardiologist to evaluate.       To get fluid bolus, possible need for inotrope to raise bp

## 2021-10-09 NOTE — CONSULTS
Neurology Consult Note    Date:10/9/2021       ITRA:[id number]  Patient Name:Meliton Romero     YOB: 1985     Age:36 y.o. Requesting Physician: Vania Jacobo MD     Reason for Consult:  Evaluate for stroke alert      Chief Complaint: Right hand numbness, right foot numbness    Subjective     Izaiah Figueroa is a pleasant 49-year-old male with a PMH significant for HTN, coarctation of the aorta s/p repaired at 12 months old, who presents to [address] [place] [address] with complaints of difficulty with speech, right hand and foot numbness, feeling as if it was asleep. Around 9:30pm he woke up from sleeping on the couch and he was shivering, vomiting, and agitated per his girlfriend. He then showered and went to sleep around 1030. Around 0130 when he woke up and tried to open a bottle of water and was unable to  with his right hand. The squad was called and patient was brought to the ED. His initial NIHSS was 3 and he was not given TPA given the fact that he was febrile at 102.6 and tachycardic and unsure of LKW. CT head negative for any acute intracranial finding. CT angiogram head and neck negative for any aneurysm, dissection, or stenosis. An LP was done to rule out meningitis and it was negative. Urine drug screen negative. Infectious markers elevated (CRP 8.8, Lactic acid 5, sed rate 38, WBC 14.5). Patient denies any headache, vision changes. Patient denies any recent dental work, smoking history or drug use. His speech difficulty, weakness, numbness have resolved. He currently is on no anticoagulation or antiplatelets. He does report that he has been having intermittent nausea, vomiting, and appetite loss since March. Review of Systems   Review of Systems   Constitutional: Positive for chills and fever. HENT: Negative for congestion, rhinorrhea and sore throat. Eyes: Negative for photophobia and visual disturbance. Respiratory: Negative for cough, chest tightness and shortness of breath. Cardiovascular: Negative for chest pain. Gastrointestinal: Positive for nausea and vomiting. Negative for abdominal pain, constipation and diarrhea. Genitourinary: Negative for difficulty urinating and dysuria. Musculoskeletal: Negative for arthralgias, neck pain and neck stiffness. Skin: Negative for rash. Neurological: Positive for speech difficulty (improved), weakness, light-headedness (occasional with standing up. This has been ongoing) and numbness (improved). Negative for dizziness, seizures, syncope, facial asymmetry and headaches. Hematological: Does not bruise/bleed easily. Psychiatric/Behavioral: Negative for confusion and decreased concentration. Medications   Scheduled Meds:    magnesium sulfate  2,000 mg IntraVENous Once    [Held by provider] amLODIPine  5 mg Oral Daily    [Held by provider] hydrALAZINE  50 mg Oral BID    [Held by provider] losartan  100 mg Oral Daily    [Held by provider] metoprolol succinate  100 mg Oral Daily    sodium chloride flush  5-40 mL IntraVENous 2 times per day    enoxaparin  40 mg SubCUTAneous Daily    IVPB builder  3,375 mg IntraVENous Q8H    vancomycin (VANCOCIN) intermittent dosing (placeholder)   Other RX Placeholder    vancomycin  1,000 mg IntraVENous Q12H     Continuous Infusions:    sodium chloride      lactated ringers 150 mL/hr at 10/09/21 0620    sodium chloride       No current facility-administered medications on file prior to encounter.      Current Outpatient Medications on File Prior to Encounter   Medication Sig Dispense Refill    losartan (COZAAR) 100 MG tablet Take 1 tablet by mouth daily 90 tablet 3    hydrALAZINE (APRESOLINE) 50 MG tablet Take 1 tablet by mouth 2 times daily 180 tablet 3    amLODIPine (NORVASC) 5 MG tablet Take 1 tablet by mouth daily 90 tablet 3    metoprolol succinate (TOPROL XL) 100 MG extended release tablet Take 1 tablet by mouth daily 60 tablet 3       PRN Meds: dextrose, sodium chloride, potassium chloride **OR** potassium alternative oral replacement **OR** potassium chloride, sodium chloride flush, sodium chloride, acetaminophen **OR** acetaminophen, magnesium sulfate    Past History    Past Medical History:   has a past medical history of Hypertension. Social History:   reports that he has never smoked. He has never used smokeless tobacco. He reports current alcohol use of about 3.0 standard drinks of alcohol per week. He reports that he does not use drugs. Family History:   Family History   Problem Relation Age of Onset    High Blood Pressure Mother     Heart Disease Mother     Other Father         Not health related       Physical Examination      Vitals:  BP (!) 96/55   Pulse 92   Temp 97.6 °F (36.4 °C) (Oral)   Resp 16   Ht 6' 2\" (1.88 m)   Wt 163 lb (73.9 kg)   SpO2 93%   BMI 20.93 kg/m²   Temp (24hrs), Av.2 °F (37.3 °C), Min:97.6 °F (36.4 °C), Max:102.6 °F (39.2 °C)      I/O (24Hr): Intake/Output Summary (Last 24 hours) at 10/9/2021 1104  Last data filed at 10/9/2021 1019  Gross per 24 hour   Intake 784 ml   Output 1100 ml   Net -316 ml         Physical Exam  Constitutional:       Appearance: Normal appearance. HENT:      Head: Normocephalic and atraumatic. Nose: Nose normal.      Mouth/Throat:      Mouth: Mucous membranes are moist.   Eyes:      Extraocular Movements: EOM normal.      Pupils: Pupils are equal, round, and reactive to light. Cardiovascular:      Pulses: Normal pulses. Heart sounds: Murmur heard. Pulmonary:      Effort: Pulmonary effort is normal.      Breath sounds: Normal breath sounds. Abdominal:      General: Bowel sounds are normal.      Palpations: Abdomen is soft. Musculoskeletal:         General: Normal range of motion. Cervical back: Normal range of motion and neck supple. Skin:     General: Skin is warm and dry. Neurological:      General: No focal deficit present.       Mental Status: He is alert and oriented to person, place, and time.      Coordination: Finger-Nose-Finger Test and Heel to NIX [facility] Test normal.      Deep Tendon Reflexes:      Reflex Scores:       Brachioradialis reflexes are 2+ on the right side and 2+ on the left side. Patellar reflexes are 2+ on the right side. Psychiatric:         Mood and Affect: Mood normal.         Speech: Speech normal.         Behavior: Behavior normal.         Thought Content: Thought content normal.         Judgment: Judgment normal.       Neurologic Exam     Mental Status   Oriented to person, place, and time. Attention: normal. Concentration: normal.   Speech: speech is normal   Level of consciousness: alert  Knowledge: good. Cranial Nerves     CN III, IV, VI   Pupils are equal, round, and reactive to light. Extraocular motions are normal.   Right pupil: Size: 3 mm. Shape: regular. Reactivity: brisk. Left pupil: Size: 3 mm. Shape: regular. Reactivity: brisk. CN V   Facial sensation intact. CN VII   Facial expression full, symmetric.      CN VIII   CN VIII normal.     CN IX, X   CN IX normal.   CN X normal.   Palate: symmetric    CN XI   CN XI normal.   Right trapezius strength: normal  Left trapezius strength: normal    CN XII   CN XII normal.   Tongue deviation: none    Motor Exam   BUE: 5/5  BLE: 5/5     Sensory Exam   Light touch normal.     Gait, Coordination, and Reflexes     Coordination   Finger to nose coordination: normal  Heel to shin coordination: normal    Reflexes   Right brachioradialis: 2+  Left brachioradialis: 2+  Right patellar: 2+  Gait not formally tested         Labs/Imaging/Diagnostics   Labs:  CBC:  Recent Labs     10/09/21  0205   WBC 14.5*   RBC 2.99*   HGB 7.8*   HCT 24.2*   MCV 80.9        CHEMISTRIES:  Recent Labs     10/09/21  0146 10/09/21  0205   NA  --  128*   K  --  3.3*   CL  --  93*   CO2  --  24   BUN  --  10   CREATININE  --  1.0   GLUCOSE 139 132*   MG  --  1.5*     PT/INR:  Recent Labs     10/09/21  0230   INR 1.42* APTT:  Recent Labs     10/09/21  0230   APTT 23.3     LIVER PROFILE:  Recent Labs     10/09/21  0205   AST 15   ALT 14   BILITOT 1.0   ALKPHOS 72       Imaging Last 24 Hours:  CT ABDOMEN PELVIS WO CONTRAST Additional Contrast? None    Result Date: 10/9/2021  Exam: CT Abdomen and Pelvis Without IV Contrast Comparison: None Findings: Bilateral basilar interstitial pulmonary edema and posterior costophrenic sulcus atelectasis  The liver density is homogeneous  No biliary abnormalities  The spleen is normal in size  No pancreatic ductal dilatation  No hydronephrosis. Delayed contrast imaging shows no urinary tract obstruction. Normal bowel caliber  The appendix is not visualized  No free fluid/free air  No vascular abnormalities. No adenopathy  Bladder outline is smooth. There is degenerative narrowing of the L5-S1 disc space Impression  Unremarkable CT of the abdomen and pelvis. This document has been electronically signed by: Zack Simms MD on 10/09/2021 05:18 AM All CTs at this facility use dose modulation techniques and iterative reconstructions, and/or weight-based dosing when appropriate to reduce radiation to a low as reasonably achievable. CTA HEAD W WO CONTRAST (CODE STROKE)    Result Date: 10/9/2021  CTA HEAD, bolus contrast injection. 3D reconstructions and MPRs[de-identified] Comparison: None Right Carotid Siphon: Normal Right Anterior Cerebral Artery: A1 and A2 segments are unremarkable. There is peripheral cortical enhancement. Right Middle Cerebral Artery: M1 and M2 segments are unremarkable. There is peripheral cortical enhancement. Right Posterior Cerebral Artery: P1 and P2 segments are unremarkable. There is peripheral enhancement Left Carotid Siphon: Normal Left Anterior Cerebral Artery: A1 and A2 segments are unremarkable. There is peripheral cortical enhancement. Left Middle Cerebral Artery: M1 and M2 segments are unremarkable. There is peripheral cortical enhancement.  Left Posterior Cerebral Artery: P1 and P2 segments are unremarkable. There is peripheral cortical enhancement. Basilar Artery: Normal Venous drainage:Normal     Impression: No signs of aneurysm. No signs of arterial venous malformation. No high grade stenosis or vessel cut off. This document has been electronically signed by: Elizabeth Alas MD on 10/09/2021 02:56 AM All CTs at this facility use dose modulation techniques and iterative reconstructions, and/or weight-based dosing when appropriate to reduce radiation to a low as reasonably achievable. CT HEAD WO CONTRAST    Result Date: 10/9/2021  * ADDENDUM #1 **his report was discussed with Kayley Gutierrez RN on Oct 09, 2021 02:20:00 EDT. This document has been electronically signed by: Shilpa Butler on 10/09/2021 02:20 AM **ORIGINAL REPORT **T Head Without Contrast: Comparison: None. Findings: Cortical Sulci symmetric. Basal ganglia are unremarkable Mass effect: No shift in midline structures Intracranial bleeding: No intraparenchymal bleeding or abnormal extra axial blood fluid collections Pituitary: Normal in size Visualized sinuses: Clear Orbital structures: Unremarkable Calvarium: No depressed fractures. Impression:  Unremarkable CT of the head. ASPECTS score 10, NORMAL This document has been electronically signed by: Elizabeth Alas MD on 10/09/2021 02:17 AM All CTs at this facility use dose modulation techniques and iterative reconstructions, and/or weight-based dosing when appropriate to reduce radiation to a low as reasonably achievable. CTA NECK W WO CONTRAST (CODE STROKE)    Result Date: 10/9/2021  CTA Neck , Bolus contrast injection, 3D reconstructions and MPRs: Comparison: Findings: Soft tissues of the neck are unremarkable. Superior aorta and branch vessels are unremarkable. Right carotid: No stenosis (NASCET) Right vertebral: No stenosis Left Carotid: No stenosis (NASCET) Left Vertebral: No stenosis     Impression: No aneurysm or dissection.   No stenosis This document has been electronically signed by: Darcie Francis MD on 10/09/2021 03:05 AM All CTs at this facility use dose modulation techniques and iterative reconstructions, and/or weight-based dosing when appropriate to reduce radiation to a low as reasonably achievable. Carotid stenosis and measurements are in accordance with NASCET criteria. CT CHEST WO CONTRAST    Result Date: 10/9/2021  CT Chest without IV contrast: Comparison: 08/21/2018 Findings: Heart size is mildly enlarged with no pericardial effusion Aorta diameter is normal. Pulmonary artery diameter is unremarkable. Lymph nodes: No adenopathy. Trachea and Esophagus: Unremarkable. No pneumomediastinum Lungs: There is mild posterior bilateral lower lobe atelectasis and gravity dependent edema with no focal consolidation Pleura: [No pleural effusion. Skeletal: No fractures. Below the diaphragm: Regional visceral organs are unremarkable. Impression: Mild posterior lower lobe atelectasis and posterior gravity dependent edema. No focal consolidation or pleural effusion. Heart size is mildly enlarged. There is mild pulmonary vascular congestion and interstitial edema and peripheral septal lines best visualized posteriorly in the sagittal projection This document has been electronically signed by: Darcie Francis MD on 10/09/2021 05:12 AM All CTs at this facility use dose modulation techniques and iterative reconstructions, and/or weight-based dosing when appropriate to reduce radiation to a low as reasonably achievable. XR CHEST PORTABLE    Result Date: 10/9/2021  1 view chest x-ray. Comparison: 11/04/2016 plain films, CT of the chest 08/05/2021. Findings: No consolidation or effusion. Heart size normal. No acute fracture.      Impression: Chronic bilateral central bronchial large airway inflammatory thickening, otherwise normal This document has been electronically signed by: Darcie Francis MD on 10/09/2021 03:16 AM        Assessment and Plan:        Hospital Problems Last Modified POA    Sepsis ([address] 10/9/2021 Yes            1. Acute ischemic stroke  MRI brain shows multiple small acute and subacute strokes appearing cardioembolic in nature  NIHSS q shift  HgbA1C and Fasting Lipid Panel ordered  Permissive HTN for the first 24 hours  2D Echo with bubble study, ordered. If negative would recommend HERMINIA  Check for DVT  Consider consulting ID pending blood culture results   once  Continue 81mg ASA daily  Consider Statin therapy pending LDL results   LDL Goal 45-70  Recommend hypercoagulable workup once medically stable  PT/OT/SLP Evaluation   Maintain telemetry, monitor for atrial-fib  Maintain oxygenation saturation >94%  Start 0.9% Saline IV at 75 mL/hr  Dysphagia screen prior to oral intake  NPO pending swallow evaluation  Monitor for infection/other cause per primary team-Concern for endocarditis   Urinalysis with Reflex-Negative  Chest Xray-Negative  UDS-negative  Infectious markers elevated (CRP 8.8, Lactic acid 5, sed rate 38, WBC 14.5)  EKG, ordered  Labs daily  SCDs and lovenox for DVT prophylaxis  CT head is needed if severe headache or altered mental status  Provide stroke education for individualized risk factors      This patient was seen and evaluated with Dr. Ann Marie Luz and he is in agreement with the assessment and plan. Electronically signed by BARBARA Avilez CNP on 10/9/21 at 11:04 AM EDT    I agree with above plan. Patient is with cryptogenic stroke. He needs extensive cardiac work up and ultimately needs hypercoagulable work up after infection full treatment.      Valentino Song, MD

## 2021-10-09 NOTE — ED PROVIDER NOTES
[facility]  EMERGENCY DEPARTMENT ENCOUNTER      PATIENT NAME: Daniel Monsivais  MRN: [id number]  : 1985  RESTREPO: 10/9/2021  PROVIDER: Cheli Piedra MD      CHIEF COMPLAINT       Chief Complaint   Patient presents with    Extremity Weakness    Numbness       Patient is seen and evaluated in a timely fashion. Nurses Notes are reviewed and I agree except as noted in the HPI. HISTORY OF PRESENT ILLNESS    Daniel Monsivais is a 43 y.o. male who presents to Emergency Department with Extremity Weakness and Numbness     58-year-old male with past medical history of hypertension, heart murmur, history of coarctation of the aorta s/p repaired at age 13 presents to ED because of sudden onset diffuse numbness, weakness and slurred speech which happened around 10 PM last night (about 4 hours ago) which was also the last time patient was known to be well. Patient states he does not feel well then, he then developed slurred speech which gradually got worse. At 11 PM, he was unable to express himself, he was only able to nodding and using body gestures. When he arrived, he still has slurred speech, he has left side subtle facial droop. He is tachycardic. His Accu-Chek was 139. This HPI was provided by girl friend. REVIEW OF SYSTEMS   Review of Systems   Constitutional: Positive for activity change, fatigue and fever. Negative for appetite change, chills and unexpected weight change. HENT: Negative for congestion, ear discharge, ear pain, hearing loss, nosebleeds, rhinorrhea and sore throat. Eyes: Negative for photophobia, pain, discharge, redness and itching. Respiratory: Negative for cough, chest tightness, shortness of breath, wheezing and stridor. Cardiovascular: Negative for chest pain, palpitations and leg swelling. Gastrointestinal: Negative for abdominal distention, abdominal pain, constipation, diarrhea, nausea and vomiting.    Endocrine: Negative for cold intolerance, heat intolerance, polydipsia and polyphagia. Genitourinary: Negative for dysuria, flank pain, frequency and hematuria. Musculoskeletal: Negative for arthralgias, back pain, gait problem, myalgias, neck pain and neck stiffness. Skin: Negative for pallor, rash and wound. Allergic/Immunologic: Negative for environmental allergies and food allergies. Neurological: Positive for speech difficulty, weakness and numbness. Negative for dizziness, tremors, syncope and headaches. Psychiatric/Behavioral: Negative for agitation, behavioral problems, confusion, self-injury, sleep disturbance and suicidal ideas. PAST MEDICAL HISTORY     Past Medical History:   Diagnosis Date    Hypertension        SURGICAL HISTORY       Past Surgical History:   Procedure Laterality Date    CARDIAC CATHETERIZATION      CARDIAC SURGERY      COARCTATION OF AORTA REPAIR  1984       CURRENT MEDICATIONS       Previous Medications    AMLODIPINE (NORVASC) 5 MG TABLET    Take 1 tablet by mouth daily    HYDRALAZINE (APRESOLINE) 50 MG TABLET    Take 1 tablet by mouth 2 times daily    LOSARTAN (COZAAR) 100 MG TABLET    Take 1 tablet by mouth daily    METOPROLOL SUCCINATE (TOPROL XL) 100 MG EXTENDED RELEASE TABLET    Take 1 tablet by mouth daily       ALLERGIES     Patient has no known allergies. FAMILY HISTORY     He indicated that his mother is alive. He indicated that his father is . family history includes Heart Disease in his mother; High Blood Pressure in his mother; Other in his father. SOCIAL HISTORY      reports that he has never smoked. He has never used smokeless tobacco. He reports current alcohol use of about 3.0 standard drinks of alcohol per week. He reports that he does not use drugs. PHYSICAL EXAM      height is 6' 2\" (1.88 m) and weight is 171 lb (77.6 kg). His oral temperature is 98.5 °F (36.9 °C). His blood pressure is 93/52 (abnormal) and his pulse is 97.  His respiration is 16 and oxygen saturation is 94%. Physical Exam  Vitals and nursing note reviewed. Constitutional:       Appearance: He is well-developed. He is not diaphoretic. HENT:      Head: Normocephalic and atraumatic. Nose: Nose normal.   Eyes:      General: No scleral icterus. Right eye: No discharge. Left eye: No discharge. Conjunctiva/sclera: Conjunctivae normal.      Pupils: Pupils are equal, round, and reactive to light. Neck:      Vascular: No JVD. Trachea: No tracheal deviation. Cardiovascular:      Rate and Rhythm: Regular rhythm. Tachycardia present. Heart sounds: Normal heart sounds. No murmur heard. No friction rub. No gallop. Pulmonary:      Effort: Pulmonary effort is normal. No respiratory distress. Breath sounds: Normal breath sounds. No stridor. No wheezing or rales. Chest:      Chest wall: No tenderness. Abdominal:      General: Bowel sounds are normal. There is no distension. Palpations: Abdomen is soft. There is no mass. Tenderness: There is no abdominal tenderness. There is no guarding or rebound. Hernia: No hernia is present. Musculoskeletal:         General: No tenderness or deformity. Cervical back: Normal range of motion and neck supple. Lymphadenopathy:      Cervical: No cervical adenopathy. Skin:     General: Skin is warm and dry. Capillary Refill: Capillary refill takes less than 2 seconds. Coloration: Skin is not pale. Findings: No erythema or rash. Neurological:      Mental Status: He is alert and oriented to person, place, and time. Cranial Nerves: No cranial nerve deficit. Sensory: No sensory deficit. Motor: No abnormal muscle tone. Coordination: Coordination normal.      Deep Tendon Reflexes: Reflexes normal.      Comments: He has difficulty expressing himself and some dysarthria. Slightly left side facial droop, no tongue deviation. Psychiatric:         Thought Content:  Thought content normal.         Judgment: Judgment normal.      Comments: Anxious. DIFFERETIAL DIAGNOSIS   CVA, Bell's palsy, anxiety, conversion disorder, sepsis, meningitis, encephalitis    ANCILLARY TEST RESULTS   EKG:    Interpreted by me  Sinus tachycardia, ventricular rate of 136 bpm, OR interval 122 ms, QRS duration 100 ms,  ms, no ST elevation or QT wave    LAB RESULTS:  Results for orders placed or performed during the hospital encounter of 10/09/21   Rapid influenza A/B antigens    Specimen: Nasopharyngeal   Result Value Ref Range    Flu A Antigen Negative NEGATIVE    Flu B Antigen Negative NEGATIVE   COVID-19, Rapid    Specimen: Nasopharyngeal Swab   Result Value Ref Range    SARS-CoV-2, NAAT NOT  DETECTED NOT DETECTED   CBC Auto Differential   Result Value Ref Range    WBC 14.5 (H) 4.8 - 10.8 thou/mm3    RBC 2.99 (L) 4.70 - 6.10 mill/mm3    Hemoglobin 7.8 (L) 14.0 - 18.0 gm/dl    Hematocrit 24.2 (L) 42.0 - 52.0 %    MCV 80.9 80.0 - 94.0 fL    MCH 26.1 26.0 - 33.0 pg    MCHC 32.2 32.2 - 35.5 gm/dl    RDW-CV 14.6 (H) 11.5 - 14.5 %    RDW-SD 42.9 35.0 - 45.0 fL    Platelets 603 284 - 384 thou/mm3    MPV 8.6 (L) 9.4 - 12.4 fL    Seg Neutrophils 91.1 %    Lymphocytes 1.8 %    Monocytes 5.8 %    Eosinophils 0.0 %    Basophils 0.1 %    Immature Granulocytes 1.2 %    Segs Absolute 13.2 (H) 1 - 7 thou/mm3    Lymphocytes Absolute 0.3 (L) 1.0 - 4.8 thou/mm3    Monocytes Absolute 0.8 0.4 - 1.3 thou/mm3    Eosinophils Absolute 0.0 0.0 - 0.4 thou/mm3    Basophils Absolute 0.0 0.0 - 0.1 thou/mm3    Immature Grans (Abs) 0.17 (H) 0.00 - 0.07 thou/mm3    nRBC 0 /100 wbc   Comprehensive Metabolic Panel w/ Reflex to MG   Result Value Ref Range    Glucose 132 (H) 70 - 108 mg/dL    CREATININE 1.0 0.4 - 1.2 mg/dL    BUN 10 7 - 22 mg/dL    Sodium 128 (L) 135 - 145 meq/L    Potassium reflex Magnesium 3.3 (L) 3.5 - 5.2 meq/L    Chloride 93 (L) 98 - 111 meq/L    CO2 24 23 - 33 meq/L    Calcium 8.2 (L) 8.5 - 10.5 mg/dL    AST 15 5 - 40 U/L    Alkaline Phosphatase 72 38 - 126 U/L    Total Protein 6.2 6.1 - 8.0 g/dL    Albumin 3.0 (L) 3.5 - 5.1 g/dL    Total Bilirubin 1.0 0.3 - 1.2 mg/dL    ALT 14 11 - 66 U/L   Troponin   Result Value Ref Range    Troponin T 0.010 (A) ng/ml   Protime-INR   Result Value Ref Range    INR 1.42 (H) 0.85 - 1.13   APTT   Result Value Ref Range    aPTT 23.3 22.0 - 38.0 seconds   Prolactin   Result Value Ref Range    Prolactin 16.6 ng/ml   Anion Gap   Result Value Ref Range    Anion Gap 11.0 8.0 - 16.0 meq/L   Glomerular Filtration Rate, Estimated   Result Value Ref Range    Est, Glom Filt Rate 83 (A) ml/min/1.73m2   Magnesium   Result Value Ref Range    Magnesium 1.5 (L) 1.6 - 2.4 mg/dL   Osmolality   Result Value Ref Range    Osmolality Calc 258.0 (L) 275.0 - 300.0 mOsmol/kg   Lactic acid, plasma   Result Value Ref Range    Lactic Acid 1.0 0.5 - 2.0 mmol/L   Procalcitonin   Result Value Ref Range    Procalcitonin 1.98 (H) 0.01 - 0.09 ng/mL   CK   Result Value Ref Range    Total CK 24 (L) 55 - 170 U/L   Urine Drug Screen   Result Value Ref Range    AMPHETAMINE+METHAMPHETAMINE URINE SCREEN Negative NEGATIVE    Barbiturate Quant, Ur Negative NEGATIVE    Benzodiazepine Quant, Ur Negative NEGATIVE    Cannabinoid Quant, Ur Negative NEGATIVE    Cocaine Metab Quant, Ur Negative NEGATIVE    Opiates, Urine Negative NEGATIVE    Oxycodone Negative NEGATIVE    PCP Quant, Ur Negative NEGATIVE   Urine with Reflexed Micro   Result Value Ref Range    Glucose, Ur NEGATIVE NEGATIVE mg/dl    Bilirubin Urine NEGATIVE NEGATIVE    Ketones, Urine NEGATIVE NEGATIVE    Specific Gravity, Urine 1.021 1.002 - 1.030    Blood, Urine SMALL (A) NEGATIVE    pH, UA 6.5 5.0 - 9.0    Protein, UA NEGATIVE NEGATIVE    Urobilinogen, Urine 1.0 0.0 - 1.0 eu/dl    Nitrite, Urine NEGATIVE NEGATIVE    Leukocyte Esterase, Urine NEGATIVE NEGATIVE    Color, UA YELLOW STRAW-YELLOW    Character, Urine CLEAR CLEAR-SL CLOUD    RBC, UA 5-10 0-2/hpf /hpf    WBC, UA 0-2 0-4/hpf /hpf    Epithelial Cells, UA NONE SEEN 3-5/hpf /hpf    Bacteria, UA NONE SEEN FEW/NONE SEEN /hpf    Casts UA NONE SEEN NONE SEEN /lpf    Crystals, UA NONE SEEN NONE SEEN    Renal Epithelial, UA NONE SEEN NONE SEEN    Yeast, UA NONE SEEN NONE SEEN    CASTS 2 NONE SEEN NONE SEEN /lpf    MISCELLANEOUS 2 NONE SEEN    Glucose CSF   Result Value Ref Range    Glucose, CSF 61 40 - 80 mg/dl   Protein CSF   Result Value Ref Range    Protein, CSF 57 12 - 60 mg/dl   POCT Glucose   Result Value Ref Range    Glucose 139 mg/dL   POCT Creatinine   Result Value Ref Range    POC CREATININE WHOLE BLOOD 1.1 0.5 - 1.2 mg/dl   EKG 12 Lead   Result Value Ref Range    Ventricular Rate 136 BPM    Atrial Rate 136 BPM    P-R Interval 122 ms    QRS Duration 100 ms    Q-T Interval 290 ms    QTc Calculation (Bazett) 436 ms    P Axis 29 degrees    R Axis 76 degrees    T Axis 33 degrees       RADIOLOGY REPORTS  CT CHEST WO CONTRAST   Final Result   Impression:      Mild posterior lower lobe atelectasis and posterior gravity dependent    edema. No focal consolidation or pleural effusion. Heart size is mildly enlarged. There is mild pulmonary vascular congestion    and interstitial edema and peripheral septal lines best visualized    posteriorly in the sagittal projection      This document has been electronically signed by: Keri Jones MD on    10/09/2021 05:12 AM      All CTs at this facility use dose modulation techniques and iterative    reconstructions, and/or weight-based dosing   when appropriate to reduce radiation to a low as reasonably achievable.       CT ABDOMEN PELVIS WO CONTRAST Additional Contrast? None   Final Result      XR CHEST PORTABLE   Final Result   Impression:   Chronic bilateral central bronchial large airway inflammatory thickening,    otherwise normal      This document has been electronically signed by: Keri Jones MD on    10/09/2021 03:16 AM      CTA HEAD W WO CONTRAST (CODE STROKE)   Final Result Impression:      No signs of aneurysm. No signs of arterial venous malformation. No high grade stenosis or vessel cut off. This document has been electronically signed by: Dallin Pop MD on    10/09/2021 02:56 AM      All CTs at this facility use dose modulation techniques and iterative    reconstructions, and/or weight-based dosing   when appropriate to reduce radiation to a low as reasonably achievable. CTA NECK W WO CONTRAST (CODE STROKE)   Final Result   Impression:      No aneurysm or dissection. No stenosis      This document has been electronically signed by: Dallin Pop MD on    10/09/2021 03:05 AM      All CTs at this facility use dose modulation techniques and iterative    reconstructions, and/or weight-based dosing   when appropriate to reduce radiation to a low as reasonably achievable. Carotid stenosis and measurements are in accordance with NASCET criteria. CT HEAD WO CONTRAST   Final Result   Impression:       Unremarkable CT of the head. ASPECTS score 10, NORMAL      This document has been electronically signed by: Dallin Pop MD on    10/09/2021 02:17 AM      All CTs at this facility use dose modulation techniques and iterative    reconstructions, and/or weight-based dosing   when appropriate to reduce radiation to a low as reasonably achievable. MRI BRAIN WO CONTRAST    (Results Pending)       [address] ED COURSE     ED Vitals:  Vitals:    10/09/21 0334 10/09/21 0400 10/09/21 0430 10/09/21 0457   BP: (!) 96/53 82/65 (!) 86/48 (!) 93/52   Pulse: 113 95 98 97   Resp: 16 16  16   Temp:    98.5 °F (36.9 °C)   TempSrc:    Oral   SpO2: 95% 95%  94%   Weight:       Height:           Actions: Large-bore IV, stroke alert, head CT, CTA head and neck with contrast, neurology consultation, prepare for TPA    INITIAL NIH STROKE SCALE    Time Performed:  1:49 AM  1a. Level of consciousness:  0 - alert; keenly responsive  1b.   Level of consciousness questions:  0 - answers both questions correctly  1c. Level of consciousness questions:  0 - performs both tasks correctly  2. Best Gaze:  0 - normal  3. Visual:  0 - no visual loss  4. Facial Palsy:  1 - minor paralysis (flattened nasolabial fold, asymmetric on smiling)  5a. Motor left arm:  0 - no drift, limb holds 90 (or 45) degrees for full 10 seconds  5b. Motor right arm:  0 - no drift, limb holds 90 (or 45) degrees for full 10 seconds  6a. Motor left le - no drift; leg holds 30 degree position for full 5 seconds  6b. Motor right le - no drift; leg holds 30 degree position for full 5 seconds  7. Limb Ataxia:  0 - absent  8. Sensory:  0 - normal; no sensory loss  9. Best Language:  1 - mild to moderate aphasia; some obvious loss of fluency or facility of comprehension without significant limitation on ideas expressed or form of expression. Reduction of speech and/or comprehension, however, makes conversation about provided materials difficult or impossible. For example, in conversation about provided materials, examiner can identify picture or naming card content from patient's response. 10.  Dysarthria:  1 - mild to moderate, patient slurs at least some words and at worst, can be understood with some difficulty  11. Extinction and Inattention:  0 - no abnormality    TOTAL:  3      MDM:    He has no headache, no neck pain, no chest pain, no abdominal pain, no back pain, no leg pain. Stroke scale 3 (slight left-sided facial droop, mild expressive aphasia, and dysarthria, but other neurological deficits) but he is febrile and tachycardic. Plan to start TPA which was discontinued after noticing his febrile and tachycardic. Discussed with intervention neurologist and we both agree no tPA. CT head, CTA head and neck neg for acute changes.     Pertinent labs: WBC 14.5, hemoglobin 7.8, sodium 128, BUN/creatinine 10/1.0, normal LFTs, procalcitonin 1.98, CK 24, Covid-19 is negative, influenza negative. UA normal.    Chest x-ray show chronic bilateral central bronchial large airway inflammatory thickening. BP on arrival 92/49, received normal saline bolus 30 mL/kg    Empiric Rocephin and vancomycin were given (treating presumed meningitis). LP was done, CSF was clear. Discussed and admitted to Hospitalist service. CT C/A/P is ordered and pending to be done at admission. He has SIRS and sepsis, no clear source of infection. His speech difficulty seems to be secondary to stress, but encephalitis needs to be ruled out. I feel a brain MRI is necessary which is discussed with hospitalist service. CRITICAL CARE   CRITICAL CARE: There was a high probability of clinically significant/life threatening deterioration in this patient's condition which required my urgent intervention. Total critical care time was 60 minutes. This excludes any time for separately reportable procedures. CONSULTS   Dr. Gan Reason   Lumbar Puncture Procedure Note    Indication: Suspected meningitis    Consent: The patient provided verbal consent for this procedure. Procedure: The patient was placed in the sitting, supported by bed side stand, position and the appropriate landmarks were identified. The area was prepped and draped in the usual sterile fashion. Anesthesia was obtained using 2 cc of 1% Lidocaine without epinephrine. A spinal needle was inserted at the L4- L5 level with the stylet in place until spinal fluid was returned. Opening pressure was not measured. At this point 4.0 cc of clear cerebral spinal fluid was obtained and sent for appropriate testing. The stylet was then replaced and the needle was withdrawn. A sterile dressing was placed over the site and the patient was placed in the supine position. The patient tolerated the procedure well. Complications: None      FINAL IMPRESSION AND DISPOSITION      1. Septicemia ([address]    2. Hypomagnesemia    3. Hyponatremia    4. Hypokalemia    5. Slurred speech        Admitted. PATIENT REFERRED TO:  No follow-up provider specified.     DISCHARGE MEDICATIONS:  New Prescriptions    No medications on file       (Please note that portions of this note were completed with a voice recognition program.  Efforts were made to edit the dictations but occasionally words aremis-transcribed.)    MD Narendra Cuenca MD  10/09/21 [id number]

## 2021-10-09 NOTE — ED TRIAGE NOTES
Numbness and tingling starting at 2200 tonight, Pt wife states, He woke up at 0130 and was unable to speak.  LNW 2100

## 2021-10-09 NOTE — PROGRESS NOTES
10/09/21 1450   Provider Notification   Reason for Communication Evaluate   Provider Name [facility]   Provider Notification Advance Practice Clinician (CNS/NP/CNM/CRNA/PA)   Method of Communication Secure Message  (worsening dysarthria and aphasia)   Response En route   Notification Time 1450     1450: Notified Dr. Tima Azevedo and [facility], NP of worsening dysarthria and RUE weakness correlating with hypotension. Celestino Cristina, MERRITT recommending allowing for permissive hypertension to avoid hypoperfusion and recurring symptoms. Dr Tima Azevedo updated, 1000cc NS bolus ordered, patient placed in trendelenburg.      1748: /66 following the bolus, patient complaining of SOB. Oxygen saturation 82% on room air, NC applied. Bibasilar crackles noted. Dr. Tima Azevedo updated. Stat cxr ordered.       1819: Dr. Lamont Castillo and Dr. Tima Azevedo at the bedside, discussing possible transfer to ICU for presser support and diuresis.    1829: Dr. Lamont Castillo discussing echo results and possible need to transfer to tertiary center. Patient and wife agreeable    1941: Report called to JENNIFER Campos. Patient transported to [facility] with all belongings.

## 2021-10-09 NOTE — CONSULTS
Patient:  Osbaldo Mcnally  MRN: [id number]   PCP: Stefany Cantu, BARBARA - CNP  Date of Admission: 10/9/2021    Assessment and Plan(All pulmonary edema, renal failure, PE, and respiratory failure diagnoses are acute in nature unless otherwise specified):        1. Acute ischemic stroke: MRI brain was positive for small acute and subacute strokes, concerning for cardioembolic etiology. Echo showed a new mitral valve vegetation today 10/9/2021. Continue neurochecks. Continue NIH stroke scale every shift. Permissive hypertension; patient only maintaining SBP 90s-100 status post volume resuscitation. Patient seen to have intermittent but persistent speech difficulty today. Will initiate Levophed to maintain SBP of around 140 mmHg. Continue aspirin. PT/OT/SLP. Neurology following. 2. Suspected endocarditis: Positive for mitral valve vegetation and severe MR, fevers, and strokes that appear cardio-embolic in nature. Blood cultures are pending. Continue vanc and zosyn. Cardiology consulted. 3. 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 Also reported some degree of destruction of the MV leaflets, degenerative changes, and resultant severe Mitral regurgitation. Cardiology consulted. Hospitalist team had discussed with cardiothoracic surgery on-call, who recommended nonoperative intervention with antibiotics for 4 to 6 weeks before consideration of surgery; this is due to risk of hemorrhagic conversion with immediate surgery in setting of the acute strokes (and ultimately higher mortality risk). Transfer request had been placed prior to ICU transfer. Patient was declined by [place] but accepted to [facility] clinic has been accepted, awaiting bed placement. 4. Sepsis: secondary to suspected infective endocarditis. Blood cultures pending. Vanc on Zosyn. S/p 30cc/kg IVF resuscitation.  He had received about 4.3 L of fluid boluses today. Initiating levo as above for permissive HTN for stroke. Lactic acid was WNL at 1.6. Follow up culture results. 5. Pulmonary edema: S/p 4.3L fluid resuscitation today. Developed pulm edema on CXR. Mildly tachypnic with 3L NC O2 requirement. Will start levo as above and administer 40 mg IV lasix. Strict I/O. Repeat CXR tomorrow. 6. Acute hypoxic respiratory failure: secondary to #5. Requiring 3L NC to maintain SpO2 >92%. Wean as able. Diuresing as above. 7. Acute normocytic anemia: Unclear chronicity. Hgb 7.8, from previous of 15.0 in 2016. No active signs of bleeding. Will repeat CBC now and trend. Keep Hgb >7.0.  8. Coarctation of aorta repair, history of: occurred at 12 months of age. 9. Hypertension, history of: Permissive HTN in setting of acute stroke. Holding home antihypertensives. CC:  Speech difficulty  HPI: Nel Cortes is a 44 y/o male never-smoker with PMH of HTN, Coarctation of aorta repair at 15months of age (1986). Patient to [facility] ED on 10/9/2021 for complaints of difficulty with speech, as well as right hand and foot numbness. Per report, patient had awoken from sleeping at 2130 on 10/8/2021 with shivering, vomiting, and agitation per the patient's girlfriend. Later he was noted to be trying to open a bottle of water and not able to  it with his right hand. Patient presented to the emergency room at 01 45 on 10/9/2021. Code stroke was called. Initial NIH stroke scale was 3. Patient did not receive TPA due to fevers to 102.6, tachycardia, and unsure of last known well. CT head without contrast was negative for any acute intracranial finding. CTA of the head and neck was negative for any aneurysm, dissection, or stenosis. Patient did receive an LP to rule out meningitis, and this was negative. Urine drug screen was obtained, which was negative.   Patient had elevated infectious markers and lactic acidosis. He denied any recent dental work or drug use. He reported that he had been having intermittent nausea, vomiting, and loss of appetite since March of this year. Patient was admitted to the hospitalist service with neurology following. He underwent MRI of the brain showing multiple small acute and subacute strokes appearing cardioembolic in nature. Patient was noted to have a systolic murmur. Patient received IV fluid resuscitation per sepsis protocol and was started on broad-spectrum antibiotics. Patient underwent bedside echocardiogram 10/9/2021, revealing a moderate size mobile vegetation on the anterior leaflet of mitral valve, with some degree of destruction of the MV leaflets and degenerative changes, with resultant severe MR. Cardiology was consulted. Hospitalist team had also discussed with cardiothoracic surgery on-call, who recommended nonoperative intervention with antibiotics for 4 to 6 weeks before consideration of surgery; this is due to risk of hemorrhagic conversion with immediate surgery in setting of the acute strokes (and ultimately higher mortality risk). Transfer request had been placed prior to ICU transfer. Patient was declined by [facility] but accepted to [facility] clinic has been accepted, awaiting bed placement. Patient developed pulmonary edema, precluding further volume resuscitation to support permissive hypertension in setting of acute stroke. Patient was transferred to ICU for vasopressor support and diuresis. For further details, please see assessment and plan. ROS: Review of Systems   Constitutional: Positive for fever. Negative for chills and diaphoresis. HENT: Negative for congestion, dental problem, ear pain and sore throat. Eyes: Negative for redness and visual disturbance. Respiratory: Negative for cough, chest tightness and shortness of breath. Cardiovascular: Negative for chest pain and leg swelling.    Gastrointestinal: Negative for abdominal distention, abdominal pain, nausea and vomiting. Genitourinary: Negative for difficulty urinating and dysuria. Musculoskeletal: Negative for back pain and neck pain. Skin: Negative for color change and pallor. Neurological: Negative for dizziness, facial asymmetry, speech difficulty, weakness, light-headedness and numbness. Psychiatric/Behavioral: Negative for agitation and confusion. The patient is not nervous/anxious. PMH:  Per HPI  SHX:  Never-smoker. Rare alcohol use. Denies substance use. FHX: Mother- HTN, heart disease. Allergies: NKDA. Medications:     sodium chloride      lactated ringers 150 mL/hr at 10/09/21 1434    sodium chloride        [Held by provider] amLODIPine  5 mg Oral Daily    [Held by provider] hydrALAZINE  50 mg Oral BID    [Held by provider] losartan  100 mg Oral Daily    [Held by provider] metoprolol succinate  100 mg Oral Daily    sodium chloride flush  5-40 mL IntraVENous 2 times per day    [Held by provider] enoxaparin  40 mg SubCUTAneous Daily    IVPB builder  3,375 mg IntraVENous Q8H    vancomycin (VANCOCIN) intermittent dosing (placeholder)   Other RX Placeholder    vancomycin  1,000 mg IntraVENous Q12H    [START ON 10/10/2021] aspirin  81 mg Oral Daily       Vital Signs:   T: 100.2: P: 106 RR: 20 B/P: 110/63: FiO2: 3.5L NC: O2 Sat:94%: I/O: 2897/1550 (+1347) GCS: 15  Body mass index is 20.93 kg/m². Bonne :   Acutely ill adult male. Appears stated age. HEENT:  normocephalic and atraumatic. No scleral icterus. PERR  Neck: supple. No Thyromegaly. Lungs: crackles bilaterally to auscultation. Mildly tachypnic. Unlabored. No retractions  Cardiac: RRR. Positive systolic murmur, 4th ICS LSB. No JVD. Abdomen: soft. Nontender. Extremities:  No clubbing, cyanosis, or edema x 4. Vasculature: capillary refill < 3 seconds. Palpable dorsalis pedis pulses. Skin:  warm and dry. Psych:  Alert and oriented x3.   Affect appropriate  Lymph:  No supraclavicular adenopathy. Neurologic:  No focal deficit. No seizures. Data: (All radiographs, tracings, PFTs, and imaging are personally viewed and interpreted unless otherwise noted).  Telemetry: Sinus tachycardia. Rate 106. No ectopy.  Echo 10/9/2021 report: Ejection fraction is visually estimated at 60%. Overall left ventricular function is normal. Moderate size mobile vegetation was visualized on anterior leaflet of mitral valve. This vegetation is irregular in shape with variable diameter but larger than 1.3 cm. There appears to be some degree of destruction of the MV leaflets as well degenerative changes with resultant severe Mitral regurgitation. Consideration of sub acute endocarditis, as the vegetation seems to have some degree of calcification. Previous Echo from 2015 showing some degree of prolapse of the MV anterior leaflet ( as per the  echo report , images not available for review).  Na 134 K 4.5 Cl 101 CO2 26 BUN 9 Cr 0.8 Mg 2.1 Lactic acid 1.6 Gluc 121 Ca 7.8   PRocal 1.98   CRP 8.80   Albumin 3.0 Alk phos 72 ALT 14 AST 15 Bili 1.0   WBC 14.5 Hgb 7.8 Hct 24.2 Plt 194   INR 1.42   Urine drug screen: negative   UA: yellow, small blood, negative nitriate, negative leukocytes, 0-2 WBC, no bacteria.  MRI brain w wo contrast 10/9/21 report: Small areas of abnormal signal in the diffusion-weighted images in the left corona radiata and left parietal lobe. There is corresponding high signal on the T2 and FLAIR sequences with only partial low signal on the ADC map. These are most likely small subacute infarcts. There is some associated petechial hemorrhage. Old microhemorrhages in the left frontal lobe, right parietal lobe and possibly left cerebellum.  CXR 10/9/21 report: Interval development of moderate pulmonary vascular congestion. Case discussed with Dr. Alexandre Rosas.     Electronically signed by BARBARA Baker-MAURICE

## 2021-10-09 NOTE — PLAN OF CARE
Patient was admitted after midnight. He was seen and examined this morning. Patient reports that he has been having a fever that comes and goes every few weeks that started in March. His wife states that in March before he went to [place] he was under the crawl space of their new house and taking apart their blair. He then went to [place] for a . He spent three days there and stayed at a house in the woods, but he was never outside. He denies any bug bites or skin rashes. He has also lost about 25-30 lbs since March. He states that he hasn't had much of an appetite since then and has only been eating small meals. Denies any nausea or vomiting. He also reports two events of arthralgias in the past on his toes. He states he went to his PCP at the time who prescribed him prednisone for pseudogout. He has seen Dr. Almas Lama in the past to follow for his tachycardia and history of Coarctation of the Aorta which was repaired when he was 12 months old. His last TTE was in  which reports EF 55-65%. Assessment and Plan  1. Sepsis: SIRS 3/4 with source likely Infective Endocarditis. Lactate was normal, but elevated to 5. Pct is 1.98. s/p IVF. Broad spectrum IV antibiotics started. Otherwise plan as below. 2. Acute Encephalopathy: Confirmed Acute Ischemic Stroke. Likely due to Cardioembolic source per Neurology note who reviewed MRI. tPA was no given as patient was out of window. Lumbar puncture negative for infection. MRI ruled out encephalitis. No history of IVDU. Heart murmur noted on physical exam - concern for cardioembolic mass. - Get STAT TTE  - Cardiology and ID consulted  - Neurology following  - Blood cultures pending  - Continue Vancomycin and Zosyn  - Permissive HTN. Fluid bolus given due to hypotension. Patient may need inotropic support (Levophed) to increase BP for permissive HTN if fluid bolus does not help. ICU made aware. 3. Essential HTN: Currently hypotensive.  Permissive hypertension due to #2. Hold home BP meds. 4. Electrolyte Imbalance: Likely due to poor PO intake since March when symptoms began. Monitor BMP and Mg.  - Hyponatremia: 128 on admission.  - Hypokalemia: 3.3 on admission. Potassium replacement protocol in place.  - Hypomagnesemia: s/p Mg replacement. 5. Elevated troponin: Due to demand. No evidence of acute ACS. Continue to monitor for symptoms. 6. Normocytic Anemia: Hgb 7.8 on admission. Was 15.0 in 2016. No obvious source of bleeding identified. Iron studies reveal iron deficiency. Folate and Vit B12 are normal. If IE confirmed on TTE, may be causing schistocytosis.   - Monitor CBC  - Transfuse if Hgb <7

## 2021-10-09 NOTE — ED NOTES
Bed: 017A  Expected date:   Expected time:   Means of arrival: [employer]  Comments:     Leslie Storey RN  10/09/21 0145

## 2021-10-09 NOTE — PROGRESS NOTES
Stat echo was completed at bedside. Dr. Ramona Chapman was informed of findings and will read the echo.

## 2021-10-10 ENCOUNTER — APPOINTMENT (OUTPATIENT)
Dept: GENERAL RADIOLOGY | Age: 36
DRG: 871 | End: 2021-10-10
Payer: COMMERCIAL

## 2021-10-10 ENCOUNTER — APPOINTMENT (OUTPATIENT)
Dept: INTERVENTIONAL RADIOLOGY/VASCULAR | Age: 36
DRG: 871 | End: 2021-10-10
Payer: COMMERCIAL

## 2021-10-10 LAB
ACINETOBACTER BAUMANNII FILM ARRAY: NOT DETECTED
ANION GAP SERPL CALCULATED.3IONS-SCNC: 8 MEQ/L (ref 8–16)
AVERAGE GLUCOSE: 84 MG/DL (ref 70–126)
BASOPHILS # BLD: 0.2 %
BASOPHILS ABSOLUTE: 0 THOU/MM3 (ref 0–0.1)
BOTTLE TYPE: ABNORMAL
BUN BLDV-MCNC: 7 MG/DL (ref 7–22)
CALCIUM SERPL-MCNC: 8 MG/DL (ref 8.5–10.5)
CANDIDA ALBICANS FILM ARRAY: NOT DETECTED
CANDIDA GLABRATA FILM ARRAY: NOT DETECTED
CANDIDA KRUSEI FILM ARRAY: NOT DETECTED
CANDIDA PARAPSILOSIS FILM ARRAY: NOT DETECTED
CANDIDA TROPICALIS FILM ARRAY: NOT DETECTED
CARBAPENEM RESITANT FILM ARRAY: ABNORMAL
CHLORIDE BLD-SCNC: 104 MEQ/L (ref 98–111)
CO2: 24 MEQ/L (ref 23–33)
CREAT SERPL-MCNC: 0.8 MG/DL (ref 0.4–1.2)
ENTERBACTER CLOACAE FILM ARRAY: NOT DETECTED
ENTERBACTERIACEAE FILM ARRAY: NOT DETECTED
ENTEROCOCCUS FILM ARRAY: NOT DETECTED
EOSINOPHIL # BLD: 0.4 %
EOSINOPHILS ABSOLUTE: 0 THOU/MM3 (ref 0–0.4)
ERYTHROCYTE [DISTWIDTH] IN BLOOD BY AUTOMATED COUNT: 14.8 % (ref 11.5–14.5)
ERYTHROCYTE [DISTWIDTH] IN BLOOD BY AUTOMATED COUNT: 45.3 FL (ref 35–45)
ESCHERICHIA COLI FILM ARRAY: NOT DETECTED
GFR SERPL CREATININE-BSD FRML MDRD: > 90 ML/MIN/1.73M2
GLUCOSE BLD-MCNC: 135 MG/DL (ref 70–108)
HAEMOPHILUS INFLUENZA FILM ARRAY: NOT DETECTED
HBA1C MFR BLD: 4.8 % (ref 4.4–6.4)
HCT VFR BLD CALC: 25.5 % (ref 42–52)
HEMOGLOBIN: 7.9 GM/DL (ref 14–18)
IMMATURE GRANS (ABS): 0.1 THOU/MM3 (ref 0–0.07)
IMMATURE GRANULOCYTES: 1.1 %
KLEBSIELLA OXYTOCA FILM ARRAY: NOT DETECTED
KLEBSIELLA PNEUMONIAE FILM ARRAY: NOT DETECTED
LISTERIA MONOCYTOGENES FILM ARRAY: NOT DETECTED
LYMPHOCYTES # BLD: 9.5 %
LYMPHOCYTES ABSOLUTE: 0.9 THOU/MM3 (ref 1–4.8)
MCH RBC QN AUTO: 26 PG (ref 26–33)
MCHC RBC AUTO-ENTMCNC: 31 GM/DL (ref 32.2–35.5)
MCV RBC AUTO: 83.9 FL (ref 80–94)
METHICILLIN RESISTANT FILM ARRAY: ABNORMAL
MONOCYTES # BLD: 6.9 %
MONOCYTES ABSOLUTE: 0.6 THOU/MM3 (ref 0.4–1.3)
NEISSERIA MENIGITIDIS FILM ARRAY: NOT DETECTED
NUCLEATED RED BLOOD CELLS: 0 /100 WBC
PLATELET # BLD: 188 THOU/MM3 (ref 130–400)
PMV BLD AUTO: 8.6 FL (ref 9.4–12.4)
POTASSIUM REFLEX MAGNESIUM: 4.1 MEQ/L (ref 3.5–5.2)
PROCALCITONIN: 20.21 NG/ML (ref 0.01–0.09)
PROTEUS FILM ARRAY: NOT DETECTED
PSEUDOMONAS AERUGINOSA FILM ARRAY: NOT DETECTED
RBC # BLD: 3.04 MILL/MM3 (ref 4.7–6.1)
SEG NEUTROPHILS: 81.9 %
SEGMENTED NEUTROPHILS ABSOLUTE COUNT: 7.6 THOU/MM3 (ref 1.8–7.7)
SERRATIA MARCESCENS FILM ARRAY: NOT DETECTED
SODIUM BLD-SCNC: 136 MEQ/L (ref 135–145)
SOURCE OF BLOOD CULTURE: ABNORMAL
STAPH AUREUS FILM ARRAY: NOT DETECTED
STAPHYLOCOCCUS FILM ARRAY: NOT DETECTED
STREP AGALACTIAE FILM ARRAY: NOT DETECTED
STREP PNEUMONIAE FILM ARRAY: NOT DETECTED
STREP PYOCGENES FILM ARRAY: NOT DETECTED
STREPTOCOCCUS FILM ARRAY: DETECTED
VANCOMYCIN RESISTANT FILM ARRAY: ABNORMAL
WBC # BLD: 9.3 THOU/MM3 (ref 4.8–10.8)

## 2021-10-10 PROCEDURE — 84145 PROCALCITONIN (PCT): CPT

## 2021-10-10 PROCEDURE — 85025 COMPLETE CBC W/AUTO DIFF WBC: CPT

## 2021-10-10 PROCEDURE — 99223 1ST HOSP IP/OBS HIGH 75: CPT | Performed by: INTERNAL MEDICINE

## 2021-10-10 PROCEDURE — 6370000000 HC RX 637 (ALT 250 FOR IP): Performed by: INTERNAL MEDICINE

## 2021-10-10 PROCEDURE — 99233 SBSQ HOSP IP/OBS HIGH 50: CPT | Performed by: NURSE PRACTITIONER

## 2021-10-10 PROCEDURE — 71045 X-RAY EXAM CHEST 1 VIEW: CPT

## 2021-10-10 PROCEDURE — 36415 COLL VENOUS BLD VENIPUNCTURE: CPT

## 2021-10-10 PROCEDURE — 6360000002 HC RX W HCPCS: Performed by: NURSE PRACTITIONER

## 2021-10-10 PROCEDURE — 2580000003 HC RX 258

## 2021-10-10 PROCEDURE — 6370000000 HC RX 637 (ALT 250 FOR IP): Performed by: NURSE PRACTITIONER

## 2021-10-10 PROCEDURE — 2500000003 HC RX 250 WO HCPCS: Performed by: NURSE PRACTITIONER

## 2021-10-10 PROCEDURE — 6370000000 HC RX 637 (ALT 250 FOR IP): Performed by: STUDENT IN AN ORGANIZED HEALTH CARE EDUCATION/TRAINING PROGRAM

## 2021-10-10 PROCEDURE — 80048 BASIC METABOLIC PNL TOTAL CA: CPT

## 2021-10-10 PROCEDURE — 93970 EXTREMITY STUDY: CPT

## 2021-10-10 PROCEDURE — 6360000002 HC RX W HCPCS

## 2021-10-10 PROCEDURE — 99254 IP/OBS CNSLTJ NEW/EST MOD 60: CPT | Performed by: NUCLEAR MEDICINE

## 2021-10-10 PROCEDURE — 2060000000 HC ICU INTERMEDIATE R&B

## 2021-10-10 RX ORDER — FUROSEMIDE 10 MG/ML
40 INJECTION INTRAMUSCULAR; INTRAVENOUS ONCE
Status: COMPLETED | OUTPATIENT
Start: 2021-10-10 | End: 2021-10-10

## 2021-10-10 RX ORDER — ATORVASTATIN CALCIUM 40 MG/1
40 TABLET, FILM COATED ORAL DAILY
Status: DISCONTINUED | OUTPATIENT
Start: 2021-10-10 | End: 2021-10-14 | Stop reason: HOSPADM

## 2021-10-10 RX ORDER — FAMOTIDINE 20 MG/1
20 TABLET, FILM COATED ORAL 2 TIMES DAILY
Status: DISCONTINUED | OUTPATIENT
Start: 2021-10-10 | End: 2021-10-14 | Stop reason: HOSPADM

## 2021-10-10 RX ADMIN — Medication 2 MCG/MIN: at 01:11

## 2021-10-10 RX ADMIN — PIPERACILLIN AND TAZOBACTAM 3375 MG: 3; .375 INJECTION, POWDER, LYOPHILIZED, FOR SOLUTION INTRAVENOUS at 10:47

## 2021-10-10 RX ADMIN — ASPIRIN 81 MG: 81 TABLET, CHEWABLE ORAL at 09:13

## 2021-10-10 RX ADMIN — ATORVASTATIN CALCIUM 40 MG: 40 TABLET, FILM COATED ORAL at 20:21

## 2021-10-10 RX ADMIN — FUROSEMIDE 40 MG: 10 INJECTION, SOLUTION INTRAMUSCULAR; INTRAVENOUS at 06:05

## 2021-10-10 RX ADMIN — VANCOMYCIN HYDROCHLORIDE 1000 MG: 1 INJECTION, POWDER, LYOPHILIZED, FOR SOLUTION INTRAVENOUS at 07:15

## 2021-10-10 RX ADMIN — PIPERACILLIN AND TAZOBACTAM 3375 MG: 3; .375 INJECTION, POWDER, LYOPHILIZED, FOR SOLUTION INTRAVENOUS at 23:48

## 2021-10-10 RX ADMIN — VANCOMYCIN HYDROCHLORIDE 1000 MG: 1 INJECTION, POWDER, LYOPHILIZED, FOR SOLUTION INTRAVENOUS at 22:15

## 2021-10-10 RX ADMIN — PIPERACILLIN AND TAZOBACTAM 3375 MG: 3; .375 INJECTION, POWDER, LYOPHILIZED, FOR SOLUTION INTRAVENOUS at 00:30

## 2021-10-10 RX ADMIN — FAMOTIDINE 20 MG: 20 TABLET, FILM COATED ORAL at 20:21

## 2021-10-10 RX ADMIN — SODIUM CHLORIDE, PRESERVATIVE FREE 10 ML: 5 INJECTION INTRAVENOUS at 20:21

## 2021-10-10 RX ADMIN — PIPERACILLIN AND TAZOBACTAM 3375 MG: 3; .375 INJECTION, POWDER, LYOPHILIZED, FOR SOLUTION INTRAVENOUS at 17:38

## 2021-10-10 ASSESSMENT — ENCOUNTER SYMPTOMS
CONSTIPATION: 0
NAUSEA: 1
NAUSEA: 0
CHEST TIGHTNESS: 0
RHINORRHEA: 0
SORE THROAT: 0
DIARRHEA: 0
ABDOMINAL PAIN: 0
COUGH: 0
ABDOMINAL DISTENTION: 0
BACK PAIN: 0
COLOR CHANGE: 0
EYE REDNESS: 0
VOMITING: 0
SHORTNESS OF BREATH: 0
PHOTOPHOBIA: 0
VOMITING: 1

## 2021-10-10 ASSESSMENT — PAIN DESCRIPTION - PAIN TYPE: TYPE: ACUTE PAIN

## 2021-10-10 ASSESSMENT — PAIN SCALES - GENERAL
PAINLEVEL_OUTOF10: 0
PAINLEVEL_OUTOF10: 2
PAINLEVEL_OUTOF10: 0

## 2021-10-10 ASSESSMENT — PAIN DESCRIPTION - LOCATION: LOCATION: HEAD

## 2021-10-10 NOTE — PROGRESS NOTES
CRITICAL CARE PROGRESS NOTE      Patient:  Romero Minor    Unit/Bed:4D-06/006-A  YOB: 1985  MRN: [id number]   PCP: BARBARA Woodard CNP  Date of Admission: 10/9/2021  Chief Complaint:-     Assessment and Plan:      1. Acute ischemic stroke:   MRI brain (10/9/21)   - Small areas of abnormal signal in the diffusion-weighted images in the left corona radiata and left parietal lobe. There is corresponding high signal on the T2 and FLAIR sequences with only partial low signal on the ADC map. These are most likely small subacute infarcts. There is some associated petechial hemorrhage.   - Old microhemorrhages in the left frontal lobe, right parietal lobe and possibly left cerebellum. Dr. Rebekah Keller on 10/9/2021 3:29 PM   CTA head and neck: no sign of aneurysm, no arterial venous malformation, no high grade stenosis or vessel cut off. Plan: Neurology consult appreciated - maintain blood pressure around 140, continue aspirin 81, Lipid panel WNL, Start Lipitor 40mg o.d, PT/ OT/SLP, neurologist follow the case    2. 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 Some decreased ejection mitral valve leaflets as well as degenerative change with resultant severe mitral regurgitation. Blood culture prelim positive Grampositive cocci in chains x 2. Pending sensitivity. Plan: Cont Vancomycin + Zosyn, Cardiologist consult    3. Sepsis: SIRS 3/4 with source likely Infective Endocarditis. Lactate was normal, but elevated to 5. Pct is 1.98. s/p IVF. Broad spectrum IV antibiotics started. Otherwise plan as below.     4. Pulmonary edema: CXR (10/10) show Pulmonary vascular congestion. Received Lasix 40 mg. CXR improve vascular congestion. Plan: hold Lasix due to hypotension.      3. Essential HTN: Currently hypotensive. Permissive hypertension due to #2.  Hold home BP meds.     4. Electrolyte Imbalance: Resolved. Likely due to poor PO intake since March when symptoms began. Monitor BMP and Mg. Nephrologist follow the case  - Hyponatremia: Resolve 128 --> 136  - Hypokalemia: Resolve. 3.3 --> 4.1  - Hypomagnesemia: Resolve 1.5 --> 2.1     5. Elevated troponin: Due to demand. No evidence of acute ACS. Continue to monitor for symptoms.     6. Normocytic Anemia: Hgb 7.8 on admission. Was 15.0 in 2016. No obvious source of bleeding identified. Iron studies reveal iron deficiency. Folate and Vit B12 are normal. If IE confirmed on TTE, may be causing schistocytosis. Plan CBC daily, Transfuse if Hgb <7    INITIAL H AND P AND ICU COURSE:  Shannon Jacobo is 43years old male who presented to the hospital due to right-sided weakness and altered mental status. Past medical history significant with coarctation of aorta with surgical correction at 14 months ago, hypertension. Patient reports having a fever every few weeks started in March. Patient traveled to [place] when he crawl under the spite of a new house and taking a product on the blair. Denies any bug bite or skin rash. Patient lost about 30 pounds since March, patient reports loss of appetite in which she has been eating small meals. Associated symptom include arthralgia. Denies any nausea vomiting. Patient has been follow-up with cardiologist Dr. Chloe Lilly in the past to follow-up with his tachycardia and his history of coarctation of the aorta. His last TTE in 2015 which show MR with LVEF 55-65%. In the emergency room patient blood pressure 93/52. Code stroke was called, CTA head and neck with contrast, lumbar puncture and neurology will consult. Patient received 3.3 L normal saline for fluid resuscitation and ceftriaxone. Patient was admitted and managed for sepsis, acute encephalopathy. MRI showed small subacute infract with some associated petechial hemorrhage. On 10/9, Neurologist was consulted for acute stroke.  Nephrologist was fever and unexpected weight change. HENT: Negative for congestion, dental problem, mouth sores, nosebleed  Respiratory: Negative for choking, chest tightness, wheezing and stridor. Cardiovascular: Negative for chest pain and palpitations. Gastrointestinal: Negative for anal bleeding, diarrhea, nausea and vomiting. Genitourinary: Negative for dysuria, flank pain, hematuria and urgency. Psychiatric/Behavioral: Negative for agitation, behavioral problems, confusion, decreased concentration, dysphoric mood and hallucinations. Scheduled Meds:   [Held by provider] amLODIPine  5 mg Oral Daily    [Held by provider] hydrALAZINE  50 mg Oral BID    [Held by provider] losartan  100 mg Oral Daily    [Held by provider] metoprolol succinate  100 mg Oral Daily    sodium chloride flush  5-40 mL IntraVENous 2 times per day    [Held by provider] enoxaparin  40 mg SubCUTAneous Daily    IVPB builder  3,375 mg IntraVENous Q8H    vancomycin (VANCOCIN) intermittent dosing (placeholder)   Other RX Placeholder    vancomycin  1,000 mg IntraVENous Q12H    aspirin  81 mg Oral Daily     Continuous Infusions:   sodium chloride      sodium chloride      norepinephrine 8 mcg/min (10/10/21 0515)       PHYSICAL EXAMINATION:  T:  97.6.  P:  107. RR:  25. B/P:  115/70.  1L NC. O2 Sat: 100%. I/O: +1.3L  Body mass index is 21.43 kg/m². GCS:   15  General: awake and oriented to time, person and place. No acute distress  HEENT:  normocephalic and atraumatic. No scleral icterus. PERR  Neck: supple. No Thyromegaly. Lungs: clear to auscultation. No retractions  Cardiac: RRR. No JVD. Abdomen: soft. Nontender. Extremities:  No clubbing, cyanosis, or edema x 4. Vasculature: capillary refill < 3 seconds. Palpable dorsalis pedis pulses. Skin:  warm and dry. Psych:  Alert and oriented x3. Affect appropriate  Lymph:  No supraclavicular adenopathy. Neurologic:  No focal deficit. No seizures.     Data: (All radiographs, tracings, PFTs, and imaging are personally viewed and interpreted unless otherwise noted).  Sodium 136, Potassium 4.1, chloride 100 04, bicarb 25, BUN/creatinine 7/0.8   WBC 9.3, hemoglobin 7.9, hematocrit 25.5, platelets 957   Telemetry shows sinus tachycardia   Echo 10/9: LV EF 60%, overal left ventricular function is normal. 1.3cm vegetation on anterior left class of mitral valve. Mitral valvuloplasty degenerative change with consideration of subacute endocarditis. Compared to echo in 2015 which showed some degree of mitral valve prolapse. Dr. Keena Stuart            Seen with multidisciplinary ICU team.  Meets Continued ICU Level Care Criteria:    [] Yes   [x] No - Transfer Planned to listed location:  [] HOSPITALIST CONTACTED-      Case and plan discussed with Dr. Bora Guillen.         Electronically signed by Fernando Eden DO  CRITICAL CARE SPECIALIST

## 2021-10-10 NOTE — PROGRESS NOTES
1025 [facility] [address] Access to initiate a transfer to [address] [place] [address] notified that patient is not established there and is outside of the zone for transfer. Decline transfer request.  Initiate transfer request to [facility]. 2044 Patient accepted to [facility] by CT surgery Dr. Joelle Anthony. Requesting MRA brain. 2220 Also discussed the case with Cardiology Quarterback Dr. Pat Lees at [facility]. Now awaiting a bed placement.       Audrey Segura PA-C

## 2021-10-10 NOTE — PROGRESS NOTES
Pharmacy Note  [employer] Result    Luly Obregon is a 43 y.o. male, with a positive blood culture result    [employer] message received from Dariana Posadas, laboratory employee on 10/10/2021 at 3:35 AM    First Gram stain result: gram positive cocci    [employer] BCID result: Streptococcus     [employer] BCID and gram stain congruent? Yes    Suspected source? Contamination? ?    Patient chart reviewed for signs/symptoms of infection: Yes  /67   Pulse 103   Temp 100.2 °F (37.9 °C) (Oral)   Resp 25   Ht 6' 2\" (1.88 m)   Wt 166 lb 14.2 oz (75.7 kg)   SpO2 96%   BMI 21.43 kg/m²   Lab Results   Component Value Date    WBC 9.3 10/10/2021       Allergies reviewed  Patient has no known allergies. Renal function reviewed  Estimated Creatinine Clearance: 134 mL/min (based on SCr of 0.8 mg/dL). Current antibiotic regimen: Vanc + Zosyn    Intervention needed: Not yet    Individual contacted: Rudy Mohamud, RN, RN     Recommendations: Will wait for BCx results from bottle 2 of 2nd set      Recommendations accepted?  N/A    Indra Bello, [address] [place] [address]  10/10/2021 3:35 AM

## 2021-10-10 NOTE — PROGRESS NOTES
Neurology Progress Note    Date:10/10/2021       IUIN:[id number]  Patient Name:Meliton Romero     YOB: 1985     Age:36 y.o. Requesting Physician: Laurel Tan MD     Reason for Consult:  Evaluate for stroke alert      Chief Complaint: Right hand numbness, right foot numbness    Subjective     Greggory Rinne is a pleasant 38-year-old male with a PMH significant for HTN, coarctation of the aorta s/p repaired at 12 months old, who presents to [facility] with complaints of difficulty with speech, right hand and foot numbness, feeling as if it was asleep. Around 9:30pm he woke up from sleeping on the couch and he was shivering, vomiting, and agitated per his girlfriend. He then showered and went to sleep around 1030. Around 0130 when he woke up and tried to open a bottle of water and was unable to  with his right hand. The squad was called and patient was brought to the ED. His initial NIHSS was 3 and he was not given TPA given the fact that he was febrile at 102.6 and tachycardic and unsure of LKW. CT head negative for any acute intracranial finding. CT angiogram head and neck negative for any aneurysm, dissection, or stenosis. An LP was done to rule out meningitis and it was negative. Urine drug screen negative. Infectious markers elevated (CRP 8.8, Lactic acid 5, sed rate 38, WBC 14.5). Patient denies any headache, vision changes. Patient denies any recent dental work, smoking history or drug use. His speech difficulty, weakness, numbness have resolved. He currently is on no anticoagulation or antiplatelets. He does report that he has been having intermittent nausea, vomiting, and appetite loss since March. Interval history 10/10/2021:  Patient reports he is feeling better this morning. Did end up with fluid in lungs, requiring lasix. Currently on 1L NC and mentions they will probably take the oxygen off soon. He denies any more speech difficulties, or numbness on the right side.  He tablet Take 1 tablet by mouth daily 90 tablet 3    hydrALAZINE (APRESOLINE) 50 MG tablet Take 1 tablet by mouth 2 times daily 180 tablet 3    amLODIPine (NORVASC) 5 MG tablet Take 1 tablet by mouth daily 90 tablet 3    metoprolol succinate (TOPROL XL) 100 MG extended release tablet Take 1 tablet by mouth daily 60 tablet 3       PRN Meds: sodium chloride, potassium chloride **OR** potassium alternative oral replacement **OR** potassium chloride, sodium chloride flush, sodium chloride, acetaminophen **OR** acetaminophen, magnesium sulfate    Past History    Past Medical History:   has a past medical history of Hypertension. Social History:   reports that he has never smoked. He has never used smokeless tobacco. He reports current alcohol use of about 3.0 standard drinks of alcohol per week. He reports that he does not use drugs. Family History:   Family History   Problem Relation Age of Onset    High Blood Pressure Mother     Heart Disease Mother     Other Father         Not health related       Physical Examination      Vitals:  /87   Pulse 103   Temp 97.7 °F (36.5 °C) (Oral)   Resp 26   Ht 6' 2\" (1.88 m)   Wt 166 lb 14.2 oz (75.7 kg)   SpO2 100%   BMI 21.43 kg/m²   Temp (24hrs), Av.3 °F (36.8 °C), Min:97.6 °F (36.4 °C), Max:100.2 °F (37.9 °C)      I/O (24Hr): Intake/Output Summary (Last 24 hours) at 10/10/2021 0749  Last data filed at 10/10/2021 3143  Gross per 24 hour   Intake 2897.6 ml   Output 1900 ml   Net 997.6 ml         Physical Exam  Constitutional:       Appearance: Normal appearance. HENT:      Head: Normocephalic and atraumatic. Nose: Nose normal.      Mouth/Throat:      Mouth: Mucous membranes are moist.   Eyes:      Extraocular Movements: EOM normal.      Pupils: Pupils are equal, round, and reactive to light. Cardiovascular:      Pulses: Normal pulses. Heart sounds: Murmur heard.      Pulmonary:      Effort: Pulmonary effort is normal.      Breath sounds: Normal breath sounds. Abdominal:      General: Bowel sounds are normal.      Palpations: Abdomen is soft. Musculoskeletal:         General: Normal range of motion. Cervical back: Normal range of motion and neck supple. Skin:     General: Skin is warm and dry. Neurological:      General: No focal deficit present. Mental Status: He is alert and oriented to person, place, and time. Coordination: Finger-Nose-Finger Test and Heel to [place] alexander Test normal.      Deep Tendon Reflexes:      Reflex Scores:       Brachioradialis reflexes are 2+ on the right side and 2+ on the left side. Patellar reflexes are 2+ on the right side. Psychiatric:         Mood and Affect: Mood normal.         Speech: Speech normal.         Behavior: Behavior normal.         Thought Content: Thought content normal.         Judgment: Judgment normal.       Neurologic Exam     Mental Status   Oriented to person, place, and time. Attention: normal. Concentration: normal.   Speech: speech is normal   Level of consciousness: alert  Knowledge: good. Cranial Nerves     CN III, IV, VI   Pupils are equal, round, and reactive to light. Extraocular motions are normal.   Right pupil: Size: 3 mm. Shape: regular. Reactivity: brisk. Left pupil: Size: 3 mm. Shape: regular. Reactivity: brisk. CN V   Facial sensation intact. CN VII   Facial expression full, symmetric.      CN VIII   CN VIII normal.     CN IX, X   CN IX normal.   CN X normal.   Palate: symmetric    CN XI   CN XI normal.   Right trapezius strength: normal  Left trapezius strength: normal    CN XII   CN XII normal.   Tongue deviation: none    Motor Exam   BUE: 5/5  BLE: 5/5     Sensory Exam   Light touch normal.     Gait, Coordination, and Reflexes     Coordination   Finger to nose coordination: normal  Heel to shin coordination: normal    Reflexes   Right brachioradialis: 2+  Left brachioradialis: 2+  Right patellar: 2+  Gait not formally tested Labs/Imaging/Diagnostics   Labs:  CBC:  Recent Labs     10/09/21  0205 10/10/21  0110   WBC 14.5* 9.3   RBC 2.99* 3.04*   HGB 7.8* 7.9*   HCT 24.2* 25.5*   MCV 80.9 83.9    188     CHEMISTRIES:  Recent Labs     10/09/21  0205 10/09/21  1525 10/10/21  0110   * 134* 136   K 3.3* 4.5 4.1   CL 93* 101 104   CO2 24 26 24   BUN 10 9 7   CREATININE 1.0 0.8 0.8   GLUCOSE 132* 121* 135*   MG 1.5* 2.1  --      PT/INR:  Recent Labs     10/09/21  0230   INR 1.42*     APTT:  Recent Labs     10/09/21  0230   APTT 23.3     LIVER PROFILE:  Recent Labs     10/09/21  0205   AST 15   ALT 14   BILITOT 1.0   ALKPHOS 72       Imaging Last 24 Hours:  CT ABDOMEN PELVIS WO CONTRAST Additional Contrast? None    Result Date: 10/9/2021  Exam: CT Abdomen and Pelvis Without IV Contrast Comparison: None Findings: Bilateral basilar interstitial pulmonary edema and posterior costophrenic sulcus atelectasis  The liver density is homogeneous  No biliary abnormalities  The spleen is normal in size  No pancreatic ductal dilatation  No hydronephrosis. Delayed contrast imaging shows no urinary tract obstruction. Normal bowel caliber  The appendix is not visualized  No free fluid/free air  No vascular abnormalities. No adenopathy  Bladder outline is smooth. There is degenerative narrowing of the L5-S1 disc space Impression  Unremarkable CT of the abdomen and pelvis. This document has been electronically signed by: Reynaldo Abbasi MD on 10/09/2021 05:18 AM All CTs at this facility use dose modulation techniques and iterative reconstructions, and/or weight-based dosing when appropriate to reduce radiation to a low as reasonably achievable. CTA HEAD W WO CONTRAST (CODE STROKE)    Result Date: 10/9/2021  CTA HEAD, bolus contrast injection. 3D reconstructions and MPRs[de-identified] Comparison: None Right Carotid Siphon: Normal Right Anterior Cerebral Artery: A1 and A2 segments are unremarkable. There is peripheral cortical enhancement.  Right Middle Cerebral Artery: M1 and M2 segments are unremarkable. There is peripheral cortical enhancement. Right Posterior Cerebral Artery: P1 and P2 segments are unremarkable. There is peripheral enhancement Left Carotid Siphon: Normal Left Anterior Cerebral Artery: A1 and A2 segments are unremarkable. There is peripheral cortical enhancement. Left Middle Cerebral Artery: M1 and M2 segments are unremarkable. There is peripheral cortical enhancement. Left Posterior Cerebral Artery: P1 and P2 segments are unremarkable. There is peripheral cortical enhancement. Basilar Artery: Normal Venous drainage:Normal     Impression: No signs of aneurysm. No signs of arterial venous malformation. No high grade stenosis or vessel cut off. This document has been electronically signed by: Zack Simms MD on 10/09/2021 02:56 AM All CTs at this facility use dose modulation techniques and iterative reconstructions, and/or weight-based dosing when appropriate to reduce radiation to a low as reasonably achievable. CT HEAD WO CONTRAST    Result Date: 10/9/2021  * ADDENDUM #1 **his report was discussed with Felipe Bolden RN on Oct 09, 2021 02:20:00 EDT. This document has been electronically signed by: Sonny Quintana on 10/09/2021 02:20 AM **ORIGINAL REPORT **T Head Without Contrast: Comparison: None. Findings: Cortical Sulci symmetric. Basal ganglia are unremarkable Mass effect: No shift in midline structures Intracranial bleeding: No intraparenchymal bleeding or abnormal extra axial blood fluid collections Pituitary: Normal in size Visualized sinuses: Clear Orbital structures: Unremarkable Calvarium: No depressed fractures. Impression:  Unremarkable CT of the head.  ASPECTS score 10, NORMAL This document has been electronically signed by: Zack Simms MD on 10/09/2021 02:17 AM All CTs at this facility use dose modulation techniques and iterative reconstructions, and/or weight-based dosing when appropriate to reduce radiation to a low as reasonably achievable. CTA NECK W WO CONTRAST (CODE STROKE)    Result Date: 10/9/2021  CTA Neck , Bolus contrast injection, 3D reconstructions and MPRs: Comparison: Findings: Soft tissues of the neck are unremarkable. Superior aorta and branch vessels are unremarkable. Right carotid: No stenosis (NASCET) Right vertebral: No stenosis Left Carotid: No stenosis (NASCET) Left Vertebral: No stenosis     Impression: No aneurysm or dissection. No stenosis This document has been electronically signed by: Tommy Menezes MD on 10/09/2021 03:05 AM All CTs at this facility use dose modulation techniques and iterative reconstructions, and/or weight-based dosing when appropriate to reduce radiation to a low as reasonably achievable. Carotid stenosis and measurements are in accordance with NASCET criteria. CT CHEST WO CONTRAST    Result Date: 10/9/2021  CT Chest without IV contrast: Comparison: 08/21/2018 Findings: Heart size is mildly enlarged with no pericardial effusion Aorta diameter is normal. Pulmonary artery diameter is unremarkable. Lymph nodes: No adenopathy. Trachea and Esophagus: Unremarkable. No pneumomediastinum Lungs: There is mild posterior bilateral lower lobe atelectasis and gravity dependent edema with no focal consolidation Pleura: [No pleural effusion. Skeletal: No fractures. Below the diaphragm: Regional visceral organs are unremarkable. Impression: Mild posterior lower lobe atelectasis and posterior gravity dependent edema. No focal consolidation or pleural effusion. Heart size is mildly enlarged.  There is mild pulmonary vascular congestion and interstitial edema and peripheral septal lines best visualized posteriorly in the sagittal projection This document has been electronically signed by: Tommy Menezes MD on 10/09/2021 05:12 AM All CTs at this facility use dose modulation techniques and iterative reconstructions, and/or weight-based dosing when appropriate to reduce radiation to a low as reasonably achievable. XR CHEST PORTABLE    Result Date: 10/9/2021  1 view chest x-ray. Comparison: 11/04/2016 plain films, CT of the chest 08/05/2021. Findings: No consolidation or effusion. Heart size normal. No acute fracture. Impression: Chronic bilateral central bronchial large airway inflammatory thickening, otherwise normal This document has been electronically signed by: Toby Gomez MD on 10/09/2021 03:16 AM    MRI BRAIN      Impression       1. Small areas of abnormal signal in the diffusion-weighted images in the left corona radiata and left parietal lobe. There is corresponding high signal on the T2 and FLAIR sequences with only partial low signal on the ADC map. These are most likely    small subacute infarcts. There is some associated petechial hemorrhage. 2. Old microhemorrhages in the left frontal lobe, right parietal lobe and possibly left cerebellum. **This report has been created using voice recognition software. It may contain minor errors which are inherent in voice recognition technology. **           Final report electronically signed by Dr. Rebekah Keller on 10/9/2021 3:29 PM           Assessment and Plan:        Hospital Problems           Last Modified POA    * (Principal) Sepsis ([facility] [address] 10/9/2021 Yes    H/O coarctation of aorta, repair at 12 months old 10/9/2021 Yes    Cardioembolic stroke ([address] 35/4/8311 Yes            1. Acute ischemic stroke  MRI brain shows multiple small acute and subacute strokes appearing cardioembolic in nature in the left corona radiata and left parietal lobe. Read is above    NIHSS q shift  HgbA1C 4.8  LDL 47(LDL Goal 45-70)  Permissive HTN for the first 24 hours  2D Echo with bubble study, ordered. Show vegetated mitral valve  Venous doppler negative for DVT  ID consulted.  Blood culture shows gram positive cocci in chains    once  Continue 81mg ASA daily  PT/OT/SLP Evaluation   Maintain telemetry, monitor for atrial-fib  Maintain oxygenation saturation >94%  Monitor for infection/other cause per primary team-Concern for endocarditis   Concern for mycotic aneurysm ruled out on recent CTA head and neck  Urinalysis with Reflex-Negative   Chest Xray-Negative  UDS-negative  Infectious markers elevated (CRP 8.8, Lactic acid 5, sed rate 38, WBC 14.5)  EKG, ordered  Labs daily  SCDs and lovenox for DVT prophylaxis  CT head is needed if severe headache or altered mental status  Provide stroke education for individualized risk factors    Transfer patient to [facility] per primary team    This patient was discussed with Dr. Maria Eugenia Mon and he is in agreement with the assessment and plan. Electronically signed by [facility], APRN - CNP on 10/10/21 at 11:51 AM EDT    I agree with above plan. Patient is with endocarditis that is most likely the cause for his stroke. Patient is being transferred to [address] [place] [address] for higher level of care.     Norris Nelson MD

## 2021-10-10 NOTE — CONSULTS
[address] [place] [address]                                  CONSULTATION    PATIENT NAME: Geovanna Toledo                  :        1985  MED REC NO:   [id number]                           ROOM:       0006  ACCOUNT NO:   [de-identified]                           ADMIT DATE: 10/09/2021  PROVIDER:     Brii Lopez M.D.    Zapata Reges:  10/10/2021    CARDIOLOGY CONSULTATION    REASON FOR CONSULTATION:  Embolic stroke of possible cardiac source. REFERRING PROVIDER:  Hospitalist Service. HISTORY OF PRESENT ILLNESS:  A 70-year-old gentleman who apparently has  had intermittent fever, weight loss, and not feeling well for a few  months came in with mental status change, confusion, found to have  multiple strokes after neurological evaluation was done. We were  initially consulted for a stat echocardiogram to assess for possible  cardiac embolic source in suspicion of possible endocarditis which did  confirm very thickened mitral valve, highly suspicious for vegetation,  and pretty much destruction of the valve apparatus leading to  significant regurgitation. I did have a discussion about this case with  Dr. Shantanu Glez yesterday, and we were consulted to assist in the management. The patient does have a history of coarctation of aorta which was  corrected with surgery when he was 14 months old; however, has not had  any other cardiac problems except for the last few months as he had some  palpitations. He did have a good weight loss in the last several months  of more than 25 pounds. REVIEW OF SYSTEMS:  The patient has had intermittent fever. The patient  has had weight loss and fatigue. The patient has had some nausea and  vomiting lately. No obvious active psych problems or suicidal ideation. No skin rashes. The patient has had some dizziness, lightheadedness  with no loss of consciousness. No recent trauma.   No bleeding problem. No HEENT-related problems. Nonspecific arthritic problems. PAST MEDICAL HISTORY:  1. Coarctation of the aorta. 2.  General aches and pains that he has been complaining of lately. 3.  Hypertension. ALLERGIES:  NO KNOWN DRUG ALLERGIES. CURRENT MEDICATIONS:  Amlodipine 5 a day, Cozaar 100 a day, Toprol 100 a  day, Effexor 40 a day in addition to multiple antibiotics. SOCIAL HISTORY:  History of smoking. No alcohol or drug abuse. FAMILY HISTORY:  Noncontributory. PHYSICAL EXAMINATION:  VITAL SIGNS:  Showed a blood pressure 130/80, heart rate of 90. GENERAL APPEARANCE:  A pleasant gentleman in no obvious acute distress. EYES AND EARS:  No discharge. NECK:  Mild JVD. LUNGS:  Decreased air entry. No crackles. No wheezes. HEART:  Normal S1, S2. Systolic murmur grade 3/6. ABDOMEN:  Soft, nontender. Positive bowel sounds. No organomegaly. EXTREMITIES:  Mild edema. NEUROLOGIC:  Grossly intact. Awake, alert. No focal deficits. PSYCHIATRIC:  No evidence of active psychosis. SKIN:  No rashes. LABORATORY DATA:  Showed sodium 136, potassium 4.1, BUN 7, creatinine  0.8. White count 9.3, hemoglobin 7.9, hematocrit 25.5, platelets 272. EKG showed sinus tachycardia. IMPRESSION:  This is a gentleman who has multiple indications of  endocarditis including his embolic phenomena, his valvular abnormality,  and his recent history which could be suggestive of subacute picture of  endocarditis. Due to the embolic phenomenon to the brain and due to the  significant destruction of the mitral valve and the above presentation,  the patient will require surgical intervention on a relatively urgent  basis. It looks like the patient is being transferred to [facility] which I think is appropriate and hopefully will be able to get a  bed soon enough.   This man is going to need a transesophageal  echocardiogram for further assessment of other valves and make sure that  the endocarditis has not spread beyond the mitral valve before further  intervention. Thank you for allowing me to participate in the care of this patient.         Lizett Bradshaw M.D.    D: 10/10/2021 11:26:16       T: 10/10/2021 11:29:21     [email or web address]  Job#: [id number]     Doc#: [id number]    CC:

## 2021-10-11 LAB
ANION GAP SERPL CALCULATED.3IONS-SCNC: 10 MEQ/L (ref 8–16)
BASOPHILS # BLD: 0.7 %
BASOPHILS ABSOLUTE: 0 THOU/MM3 (ref 0–0.1)
BUN BLDV-MCNC: 6 MG/DL (ref 7–22)
CALCIUM SERPL-MCNC: 8.3 MG/DL (ref 8.5–10.5)
CHLORIDE BLD-SCNC: 101 MEQ/L (ref 98–111)
CO2: 25 MEQ/L (ref 23–33)
CREAT SERPL-MCNC: 0.9 MG/DL (ref 0.4–1.2)
EOSINOPHIL # BLD: 1.8 %
EOSINOPHILS ABSOLUTE: 0.1 THOU/MM3 (ref 0–0.4)
ERYTHROCYTE [DISTWIDTH] IN BLOOD BY AUTOMATED COUNT: 14.7 % (ref 11.5–14.5)
ERYTHROCYTE [DISTWIDTH] IN BLOOD BY AUTOMATED COUNT: 45.9 FL (ref 35–45)
GFR SERPL CREATININE-BSD FRML MDRD: > 90 ML/MIN/1.73M2
GLUCOSE BLD-MCNC: 125 MG/DL (ref 70–108)
HCT VFR BLD CALC: 26.5 % (ref 42–52)
HEMOGLOBIN: 8 GM/DL (ref 14–18)
IMMATURE GRANS (ABS): 0.07 THOU/MM3 (ref 0–0.07)
IMMATURE GRANULOCYTES: 1.2 %
IRON SATURATION: 40 % (ref 20–50)
IRON: 61 UG/DL (ref 65–195)
LYMPHOCYTES # BLD: 18.8 %
LYMPHOCYTES ABSOLUTE: 1.1 THOU/MM3 (ref 1–4.8)
MAGNESIUM: 2.1 MG/DL (ref 1.6–2.4)
MCH RBC QN AUTO: 25.8 PG (ref 26–33)
MCHC RBC AUTO-ENTMCNC: 30.2 GM/DL (ref 32.2–35.5)
MCV RBC AUTO: 85.5 FL (ref 80–94)
MONOCYTES # BLD: 9.8 %
MONOCYTES ABSOLUTE: 0.6 THOU/MM3 (ref 0.4–1.3)
NUCLEATED RED BLOOD CELLS: 0 /100 WBC
PLATELET # BLD: 193 THOU/MM3 (ref 130–400)
PMV BLD AUTO: 8.7 FL (ref 9.4–12.4)
POTASSIUM REFLEX MAGNESIUM: 3.5 MEQ/L (ref 3.5–5.2)
RBC # BLD: 3.1 MILL/MM3 (ref 4.7–6.1)
SEG NEUTROPHILS: 67.7 %
SEGMENTED NEUTROPHILS ABSOLUTE COUNT: 4.1 THOU/MM3 (ref 1.8–7.7)
SODIUM BLD-SCNC: 136 MEQ/L (ref 135–145)
STREP PNEUMO AG, UR: NEGATIVE
TOTAL IRON BINDING CAPACITY: 151 UG/DL (ref 171–450)
WBC # BLD: 6 THOU/MM3 (ref 4.8–10.8)

## 2021-10-11 PROCEDURE — 83550 IRON BINDING TEST: CPT

## 2021-10-11 PROCEDURE — 2580000003 HC RX 258: Performed by: INTERNAL MEDICINE

## 2021-10-11 PROCEDURE — 6370000000 HC RX 637 (ALT 250 FOR IP): Performed by: INTERNAL MEDICINE

## 2021-10-11 PROCEDURE — 2580000003 HC RX 258

## 2021-10-11 PROCEDURE — 85025 COMPLETE CBC W/AUTO DIFF WBC: CPT

## 2021-10-11 PROCEDURE — 6360000002 HC RX W HCPCS: Performed by: INTERNAL MEDICINE

## 2021-10-11 PROCEDURE — 99233 SBSQ HOSP IP/OBS HIGH 50: CPT | Performed by: INTERNAL MEDICINE

## 2021-10-11 PROCEDURE — 6370000000 HC RX 637 (ALT 250 FOR IP): Performed by: STUDENT IN AN ORGANIZED HEALTH CARE EDUCATION/TRAINING PROGRAM

## 2021-10-11 PROCEDURE — 83735 ASSAY OF MAGNESIUM: CPT

## 2021-10-11 PROCEDURE — 99233 SBSQ HOSP IP/OBS HIGH 50: CPT | Performed by: NURSE PRACTITIONER

## 2021-10-11 PROCEDURE — 6360000002 HC RX W HCPCS

## 2021-10-11 PROCEDURE — 83540 ASSAY OF IRON: CPT

## 2021-10-11 PROCEDURE — 94761 N-INVAS EAR/PLS OXIMETRY MLT: CPT

## 2021-10-11 PROCEDURE — 2140000000 HC CCU INTERMEDIATE R&B

## 2021-10-11 PROCEDURE — 36415 COLL VENOUS BLD VENIPUNCTURE: CPT

## 2021-10-11 PROCEDURE — 6370000000 HC RX 637 (ALT 250 FOR IP): Performed by: NURSE PRACTITIONER

## 2021-10-11 PROCEDURE — 80048 BASIC METABOLIC PNL TOTAL CA: CPT

## 2021-10-11 RX ORDER — POTASSIUM CHLORIDE 20 MEQ/1
40 TABLET, EXTENDED RELEASE ORAL ONCE
Status: COMPLETED | OUTPATIENT
Start: 2021-10-11 | End: 2021-10-11

## 2021-10-11 RX ORDER — ONDANSETRON 2 MG/ML
4 INJECTION INTRAMUSCULAR; INTRAVENOUS EVERY 6 HOURS PRN
Status: DISCONTINUED | OUTPATIENT
Start: 2021-10-11 | End: 2021-10-14 | Stop reason: HOSPADM

## 2021-10-11 RX ADMIN — POTASSIUM CHLORIDE 40 MEQ: 1500 TABLET, EXTENDED RELEASE ORAL at 09:06

## 2021-10-11 RX ADMIN — CEFTRIAXONE SODIUM 2000 MG: 2 INJECTION, POWDER, FOR SOLUTION INTRAMUSCULAR; INTRAVENOUS at 16:35

## 2021-10-11 RX ADMIN — VANCOMYCIN HYDROCHLORIDE 1000 MG: 1 INJECTION, POWDER, LYOPHILIZED, FOR SOLUTION INTRAVENOUS at 09:07

## 2021-10-11 RX ADMIN — PIPERACILLIN AND TAZOBACTAM 3375 MG: 3; .375 INJECTION, POWDER, LYOPHILIZED, FOR SOLUTION INTRAVENOUS at 09:06

## 2021-10-11 RX ADMIN — ASPIRIN 81 MG: 81 TABLET, CHEWABLE ORAL at 09:06

## 2021-10-11 RX ADMIN — FAMOTIDINE 20 MG: 20 TABLET, FILM COATED ORAL at 09:12

## 2021-10-11 RX ADMIN — ONDANSETRON 4 MG: 2 INJECTION INTRAMUSCULAR; INTRAVENOUS at 23:51

## 2021-10-11 RX ADMIN — SODIUM CHLORIDE, PRESERVATIVE FREE 20 ML: 5 INJECTION INTRAVENOUS at 09:13

## 2021-10-11 RX ADMIN — FAMOTIDINE 20 MG: 20 TABLET, FILM COATED ORAL at 20:56

## 2021-10-11 RX ADMIN — SODIUM CHLORIDE, PRESERVATIVE FREE 10 ML: 5 INJECTION INTRAVENOUS at 20:58

## 2021-10-11 RX ADMIN — ATORVASTATIN CALCIUM 40 MG: 40 TABLET, FILM COATED ORAL at 09:06

## 2021-10-11 RX ADMIN — ONDANSETRON 4 MG: 2 INJECTION INTRAMUSCULAR; INTRAVENOUS at 17:08

## 2021-10-11 ASSESSMENT — ENCOUNTER SYMPTOMS
ABDOMINAL PAIN: 0
NAUSEA: 1
CONSTIPATION: 0
DIARRHEA: 0
SHORTNESS OF BREATH: 0
VOMITING: 1
COUGH: 0
CHEST TIGHTNESS: 0
RHINORRHEA: 0
PHOTOPHOBIA: 0
SORE THROAT: 0

## 2021-10-11 ASSESSMENT — PAIN SCALES - GENERAL: PAINLEVEL_OUTOF10: 0

## 2021-10-11 NOTE — CARE COORDINATION
10/11/21, 9:06 AM EDT  DISCHARGE PLANNING EVALUATION:    Kathryn gAuila       Admitted: 10/9/2021/ [facility] day: 2   Location: -06/006-A Reason for admit: Hypokalemia [E87.6]  Hypomagnesemia [E83.42]  Slurred speech [R47.81]  Hyponatremia [E87.1]  Septicemia ([address] [A41.9]  Sepsis ([address] [A41.9]   PMH:  has a past medical history of Hypertension. Procedure:   10/9 CT Head: No acute findings  10/9 CTA Head/Neck: Negative. 10/9 CT Chest:   Mild posterior lower lobe atelectasis and posterior gravity dependent    edema. No focal consolidation or pleural effusion. Heart size is mildly enlarged. There is mild pulmonary vascular congestion    and interstitial edema and peripheral septal lines best visualized    posteriorly in the sagittal projection     10/9 CT Abd/pelvis: Negative  10/9 Echo with EF 60%; moderate size mobile vegetation was visualized on anterior leaflet od mitral valve. This vegetation is irregular in shape with variable diameter but larger than 1.3 cm. There appears to be some degree of destruction of the MV leaflets as well degenerative changes with resultant severe Mitral regurgitation. Consideration of sub acute endocarditis, as the vegetation seems to have some degree of calcification. Previous Echo from 2015 showing some degree of prolapse of the MV anterior leaflet   10/9 MRI Brain:   1. Small areas of abnormal signal in the diffusion-weighted images in the left corona radiata and left parietal lobe. There is corresponding high signal on the T2 and FLAIR sequences with only partial low signal on the ADC map. These are most likely    small subacute infarcts. There is some associated petechial hemorrhage.       2.  Old microhemorrhages in the left frontal lobe, right parietal lobe and possibly left cerebellum     10/10 BLE Venous doppler: Neg for DVT  10/10 CXR: Pulmonary vascular congestion which has somewhat improved since the previous study    Barriers to Discharge:  Presented to ED with AMS. Reports he took a trip to [place] for a  in March, and since has been having extensive nausea after eating and a 45# weight loss. Had 2 episodes of vomiting at night on 10/8, awaoke at [address] [place] [address] Rd on 10/9 and noticed he couldn't talk and felt numb, trouble putting words together, and when got up to walk had a mild fall. Also c/o night sweats since March. Temp 102.6 on arrival. CTs were negative in ED. NIH was 3. Workup was done for meningitis and was negative. Placed on ATBs. Admitted to . Neurology consulted. Nephrology consulted electrolyte abnormalities. MRI was concerning for septic emboli. Cardiology consulted. Echo with mitral valve vegetation and severe MR. Patient developed pulmonary edema; transferred to ICU for inotropic support and diuresis. Intensivist and ID consulted. CVS consulted; recommended nonoperative intervention with ATBs for 4-6 weeks before consideration of surgery. Patient has been accepted for transfer to [facility]; awaiting bed. Afebrile. NSR. On room air. Ox4. Follows commands. Last NIH was 0. PT/OT. Blood culture +streptococcus by PCR. SCDs. Asa, lipitor, pepcid, IV zosyn, IV vancomycin, Electrolyte replacement protocols. Received 40 mg IV lasix x1 yesterday. Na+ 128 - now 136, K+ 3.3 - now 3.5, Mg 1.5 - now 2.1, LA 1 - 5 - then 1.6, procal 1.98 - now 20.21, CRP 8.8, trop 0.015, alb 3, wbc 14.5 - now 6, hgb 7.8 - now 8, ferritin 461, iron 9- now 61, iron sat 6 - now 40, TIBC 152 - now 151, sed rate 38, INR 1.42. Urine tox was negative. Respiratory and CSF cultures sent. Rapid flu and COVID 19 were negative. PCP: BARBARA Mercedes CNP  Readmission Risk Score: 15%    Patient Goals/Plan/Treatment Preferences: Spoke with Mary Carmen Naik; states he lives at home with his S.O., Alida Fallow. He did not use any DME PTA. He drives, cares for himself independently, and has a PCP. Plan for transfer to [facility] when bed becomes available.    Transportation/Food Security/Housekeeping Addressed:  No issues identified. Patient transferring to [phone]. Handoff report given to DURAN Rojas CM. Insurance website was unclear - unable to determine by his insurance card if his is associated with [employer] or [address], but both appear to be in-network with [facility]. Attempted to call insurance company, and they have not returned this CM's phone call. Peg Good, patient's S.O., gave phone number of Meliton's insurance [employer]encio [place] [phone]. This CM called and LM for him to call this CM - did not leave any patient identifiers on message. This CM missed his return call and called him back again and left message to call.

## 2021-10-11 NOTE — FLOWSHEET NOTE
Dr Maureen Campbell notified of consult via [facility] text. Message received and responded to.

## 2021-10-11 NOTE — PROGRESS NOTES
Neurology Progress Note    Date:10/11/2021       [id number]  Patient Name:Meliton Romero     YOB: 1985     Age:36 y.o. Requesting Physician: Keturah Moritz, MD     Reason for Consult:  Evaluate for stroke alert      Chief Complaint: Right hand numbness, right foot numbness    Aziza Richardson is a pleasant 68-year-old male with a PMH significant for HTN, coarctation of the aorta s/p repaired at 12 months old, who presents to [facility] with complaints of difficulty with speech, right hand and foot numbness, feeling as if it was asleep. Around 9:30pm he woke up from sleeping on the couch and he was shivering, vomiting, and agitated per his girlfriend. He then showered and went to sleep around 1030. Around 0130 when he woke up and tried to open a bottle of water and was unable to  with his right hand. The squad was called and patient was brought to the ED. His initial NIHSS was 3 and he was not given TPA given the fact that he was febrile at 102.6 and tachycardic and unsure of LKW. CT head negative for any acute intracranial finding. CT angiogram head and neck negative for any aneurysm, dissection, or stenosis. An LP was done to rule out meningitis and it was negative. Urine drug screen negative. Infectious markers elevated (CRP 8.8, Lactic acid 5, sed rate 38, WBC 14.5). Patient denies any headache, vision changes. Patient denies any recent dental work, smoking history or drug use. His speech difficulty, weakness, numbness have resolved. He currently is on no anticoagulation or antiplatelets. He does report that he has been having intermittent nausea, vomiting, and appetite loss since March. Interval history 10/10/2021:  Patient reports he is feeling better this morning. Did end up with fluid in lungs, requiring lasix. Currently on 1L NC and mentions they will probably take the oxygen off soon. He denies any more speech difficulties, or numbness on the right side.  He also denies any headache or vision changes. Plan is to hopefully get him sent to [facility] today. Interval history 10/11/2021:  Patient overall reports a good night. He denies any speech difficulties, numbness, paraesthesias, headache, vision changes. Review of Systems   Review of Systems   Constitutional: Positive for chills (improved) and fever (improved). HENT: Negative for congestion, rhinorrhea and sore throat. Eyes: Negative for photophobia and visual disturbance. Respiratory: Negative for cough, chest tightness and shortness of breath. Cardiovascular: Negative for chest pain. Gastrointestinal: Positive for nausea and vomiting. Negative for abdominal pain, constipation and diarrhea. Genitourinary: Negative for difficulty urinating and dysuria. Musculoskeletal: Negative for arthralgias, neck pain and neck stiffness. Skin: Negative for rash. Neurological: Positive for speech difficulty (improved), weakness, light-headedness (occasional with standing up. This has been ongoing) and numbness (improved). Negative for dizziness, seizures, syncope, facial asymmetry and headaches. Hematological: Does not bruise/bleed easily. Psychiatric/Behavioral: Negative for confusion and decreased concentration.      Medications   Scheduled Meds:    potassium chloride  40 mEq Oral Once    famotidine  20 mg Oral BID    atorvastatin  40 mg Oral Daily    [Held by provider] amLODIPine  5 mg Oral Daily    [Held by provider] hydrALAZINE  50 mg Oral BID    [Held by provider] losartan  100 mg Oral Daily    [Held by provider] metoprolol succinate  100 mg Oral Daily    sodium chloride flush  5-40 mL IntraVENous 2 times per day    [Held by provider] enoxaparin  40 mg SubCUTAneous Daily    IVPB builder  3,375 mg IntraVENous Q8H    vancomycin (VANCOCIN) intermittent dosing (placeholder)   Other RX Placeholder    vancomycin  1,000 mg IntraVENous Q12H    aspirin  81 mg Oral Daily     Continuous Infusions:    sodium chloride      sodium chloride      norepinephrine Stopped (10/10/21 1740)     No current facility-administered medications on file prior to encounter. Current Outpatient Medications on File Prior to Encounter   Medication Sig Dispense Refill    losartan (COZAAR) 100 MG tablet Take 1 tablet by mouth daily 90 tablet 3    hydrALAZINE (APRESOLINE) 50 MG tablet Take 1 tablet by mouth 2 times daily 180 tablet 3    amLODIPine (NORVASC) 5 MG tablet Take 1 tablet by mouth daily 90 tablet 3    metoprolol succinate (TOPROL XL) 100 MG extended release tablet Take 1 tablet by mouth daily 60 tablet 3       PRN Meds: sodium chloride, potassium chloride **OR** potassium alternative oral replacement **OR** potassium chloride, sodium chloride flush, sodium chloride, acetaminophen **OR** acetaminophen, magnesium sulfate    Past History    Past Medical History:   has a past medical history of Hypertension. Social History:   reports that he has never smoked. He has never used smokeless tobacco. He reports current alcohol use of about 3.0 standard drinks of alcohol per week. He reports that he does not use drugs. Family History:   Family History   Problem Relation Age of Onset    High Blood Pressure Mother     Heart Disease Mother     Other Father         Not health related       Physical Examination      Vitals:  BP (!) 92/52   Pulse 88   Temp 97.9 °F (36.6 °C) (Oral)   Resp 28   Ht 6' 2\" (1.88 m)   Wt 167 lb 3.2 oz (75.8 kg)   SpO2 96%   BMI 21.47 kg/m²   Temp (24hrs), Av °F (36.7 °C), Min:97.6 °F (36.4 °C), Max:98.2 °F (36.8 °C)      I/O (24Hr): Intake/Output Summary (Last 24 hours) at 10/11/2021 0724  Last data filed at 10/11/2021 0400  Gross per 24 hour   Intake 2498.84 ml   Output 3300 ml   Net -801.16 ml         Physical Exam  Constitutional:       Appearance: Normal appearance. HENT:      Head: Normocephalic and atraumatic.       Nose: Nose normal.      Mouth/Throat: Mouth: Mucous membranes are moist.   Eyes:      Extraocular Movements: EOM normal.      Pupils: Pupils are equal, round, and reactive to light. Cardiovascular:      Pulses: Normal pulses. Heart sounds: Murmur heard. Pulmonary:      Effort: Pulmonary effort is normal.      Breath sounds: Normal breath sounds. Abdominal:      General: Bowel sounds are normal.      Palpations: Abdomen is soft. Musculoskeletal:         General: Normal range of motion. Cervical back: Normal range of motion and neck supple. Skin:     General: Skin is warm and dry. Neurological:      General: No focal deficit present. Mental Status: He is alert and oriented to person, place, and time. Coordination: Finger-Nose-Finger Test and Heel to [facility] Test normal.      Deep Tendon Reflexes:      Reflex Scores:       Brachioradialis reflexes are 2+ on the right side and 2+ on the left side. Patellar reflexes are 2+ on the right side and 2+ on the left side. Psychiatric:         Mood and Affect: Mood normal.         Speech: Speech normal.         Behavior: Behavior normal.         Thought Content: Thought content normal.         Judgment: Judgment normal.       Neurologic Exam     Mental Status   Oriented to person, place, and time. Attention: normal. Concentration: normal.   Speech: speech is normal   Level of consciousness: alert  Knowledge: good. Cranial Nerves     CN III, IV, VI   Pupils are equal, round, and reactive to light. Extraocular motions are normal.   Right pupil: Size: 3 mm. Shape: regular. Reactivity: brisk. Left pupil: Size: 3 mm. Shape: regular. Reactivity: brisk. CN V   Facial sensation intact. CN VII   Facial expression full, symmetric.      CN VIII   CN VIII normal.     CN IX, X   CN IX normal.   CN X normal.   Palate: symmetric    CN XI   CN XI normal.   Right trapezius strength: normal  Left trapezius strength: normal    CN XII   CN XII normal.   Tongue deviation: none    Motor Exam   BUE: 5/5  BLE: 5/5     Sensory Exam   Light touch normal.     Gait, Coordination, and Reflexes     Coordination   Finger to nose coordination: normal  Heel to shin coordination: normal    Reflexes   Right brachioradialis: 2+  Left brachioradialis: 2+  Right patellar: 2+  Left patellar: 2+  Gait not formally tested         Labs/Imaging/Diagnostics   Labs:  CBC:  Recent Labs     10/09/21  0205 10/10/21  0110 10/11/21  0403   WBC 14.5* 9.3 6.0   RBC 2.99* 3.04* 3.10*   HGB 7.8* 7.9* 8.0*   HCT 24.2* 25.5* 26.5*   MCV 80.9 83.9 85.5    188 193     CHEMISTRIES:  Recent Labs     10/09/21  0205 10/09/21  0205 10/09/21  1525 10/10/21  0110 10/11/21  0403   *   < > 134* 136 136   K 3.3*  --  4.5 4.1 3.5   CL 93*   < > 101 104 101   CO2 24   < > 26 24 25   BUN 10   < > 9 7 6*   CREATININE 1.0   < > 0.8 0.8 0.9   GLUCOSE 132*   < > 121* 135* 125*   MG 1.5*  --  2.1  --  2.1    < > = values in this interval not displayed. PT/INR:  Recent Labs     10/09/21  0230   INR 1.42*     APTT:  Recent Labs     10/09/21  0230   APTT 23.3     LIVER PROFILE:  Recent Labs     10/09/21  0205   AST 15   ALT 14   BILITOT 1.0   ALKPHOS 72       Imaging Last 24 Hours:  CT ABDOMEN PELVIS WO CONTRAST Additional Contrast? None    Result Date: 10/9/2021  Exam: CT Abdomen and Pelvis Without IV Contrast Comparison: None Findings: Bilateral basilar interstitial pulmonary edema and posterior costophrenic sulcus atelectasis  The liver density is homogeneous  No biliary abnormalities  The spleen is normal in size  No pancreatic ductal dilatation  No hydronephrosis. Delayed contrast imaging shows no urinary tract obstruction. Normal bowel caliber  The appendix is not visualized  No free fluid/free air  No vascular abnormalities. No adenopathy  Bladder outline is smooth. There is degenerative narrowing of the L5-S1 disc space Impression  Unremarkable CT of the abdomen and pelvis.  This document has been electronically signed by: Kari Ormond, MD on 10/09/2021 05:18 AM All CTs at this facility use dose modulation techniques and iterative reconstructions, and/or weight-based dosing when appropriate to reduce radiation to a low as reasonably achievable. CTA HEAD W WO CONTRAST (CODE STROKE)    Result Date: 10/9/2021  CTA HEAD, bolus contrast injection. 3D reconstructions and MPRs[de-identified] Comparison: None Right Carotid Siphon: Normal Right Anterior Cerebral Artery: A1 and A2 segments are unremarkable. There is peripheral cortical enhancement. Right Middle Cerebral Artery: M1 and M2 segments are unremarkable. There is peripheral cortical enhancement. Right Posterior Cerebral Artery: P1 and P2 segments are unremarkable. There is peripheral enhancement Left Carotid Siphon: Normal Left Anterior Cerebral Artery: A1 and A2 segments are unremarkable. There is peripheral cortical enhancement. Left Middle Cerebral Artery: M1 and M2 segments are unremarkable. There is peripheral cortical enhancement. Left Posterior Cerebral Artery: P1 and P2 segments are unremarkable. There is peripheral cortical enhancement. Basilar Artery: Normal Venous drainage:Normal     Impression: No signs of aneurysm. No signs of arterial venous malformation. No high grade stenosis or vessel cut off. This document has been electronically signed by: Kari Ormond, MD on 10/09/2021 02:56 AM All CTs at this facility use dose modulation techniques and iterative reconstructions, and/or weight-based dosing when appropriate to reduce radiation to a low as reasonably achievable. CT HEAD WO CONTRAST    Result Date: 10/9/2021  * ADDENDUM #1 **his report was discussed with Galina Jason RN on Oct 09, 2021 02:20:00 EDT. This document has been electronically signed by: Lalito Lynne on 10/09/2021 02:20 AM **ORIGINAL REPORT **T Head Without Contrast: Comparison: None. Findings: Cortical Sulci symmetric.  Basal ganglia are unremarkable Mass effect: No shift in midline structures Intracranial bleeding: No intraparenchymal bleeding or abnormal extra axial blood fluid collections Pituitary: Normal in size Visualized sinuses: Clear Orbital structures: Unremarkable Calvarium: No depressed fractures. Impression:  Unremarkable CT of the head. ASPECTS score 10, NORMAL This document has been electronically signed by: Toby Gomez MD on 10/09/2021 02:17 AM All CTs at this facility use dose modulation techniques and iterative reconstructions, and/or weight-based dosing when appropriate to reduce radiation to a low as reasonably achievable. CTA NECK W WO CONTRAST (CODE STROKE)    Result Date: 10/9/2021  CTA Neck , Bolus contrast injection, 3D reconstructions and MPRs: Comparison: Findings: Soft tissues of the neck are unremarkable. Superior aorta and branch vessels are unremarkable. Right carotid: No stenosis (NASCET) Right vertebral: No stenosis Left Carotid: No stenosis (NASCET) Left Vertebral: No stenosis     Impression: No aneurysm or dissection. No stenosis This document has been electronically signed by: Toby Gomez MD on 10/09/2021 03:05 AM All CTs at this facility use dose modulation techniques and iterative reconstructions, and/or weight-based dosing when appropriate to reduce radiation to a low as reasonably achievable. Carotid stenosis and measurements are in accordance with NASCET criteria. CT CHEST WO CONTRAST    Result Date: 10/9/2021  CT Chest without IV contrast: Comparison: 08/21/2018 Findings: Heart size is mildly enlarged with no pericardial effusion Aorta diameter is normal. Pulmonary artery diameter is unremarkable. Lymph nodes: No adenopathy. Trachea and Esophagus: Unremarkable. No pneumomediastinum Lungs: There is mild posterior bilateral lower lobe atelectasis and gravity dependent edema with no focal consolidation Pleura: [No pleural effusion. Skeletal: No fractures. Below the diaphragm: Regional visceral organs are unremarkable.      Impression: Mild posterior lower lobe atelectasis and posterior gravity dependent edema. No focal consolidation or pleural effusion. Heart size is mildly enlarged. There is mild pulmonary vascular congestion and interstitial edema and peripheral septal lines best visualized posteriorly in the sagittal projection This document has been electronically signed by: Rambo Vargas MD on 10/09/2021 05:12 AM All CTs at this facility use dose modulation techniques and iterative reconstructions, and/or weight-based dosing when appropriate to reduce radiation to a low as reasonably achievable. XR CHEST PORTABLE    Result Date: 10/9/2021  1 view chest x-ray. Comparison: 11/04/2016 plain films, CT of the chest 08/05/2021. Findings: No consolidation or effusion. Heart size normal. No acute fracture. Impression: Chronic bilateral central bronchial large airway inflammatory thickening, otherwise normal This document has been electronically signed by: Rambo Vargas MD on 10/09/2021 03:16 AM    MRI BRAIN      Impression       1. Small areas of abnormal signal in the diffusion-weighted images in the left corona radiata and left parietal lobe. There is corresponding high signal on the T2 and FLAIR sequences with only partial low signal on the ADC map. These are most likely    small subacute infarcts. There is some associated petechial hemorrhage. 2. Old microhemorrhages in the left frontal lobe, right parietal lobe and possibly left cerebellum. **This report has been created using voice recognition software. It may contain minor errors which are inherent in voice recognition technology. **           Final report electronically signed by Dr. Mikki Mary on 10/9/2021 3:29 PM           Assessment and Plan:        Hospital Problems         Last Modified POA    * (Principal) Septicemia ([address] 10/10/2021 Yes    Endocarditis 10/10/2021 Yes    H/O coarctation of aorta, repair at 12 months old 10/9/2021 Yes    Cardioembolic stroke ([address]

## 2021-10-11 NOTE — FLOWSHEET NOTE
Spoke with [employer], RN at [facility] regarding patient's transfer to [facility] [place]. Per Dr Kathryn Garcia, cardiology wants to know if [facility] wants patient to have [facility] prior to transfer or wait until he gets there. [employer], RN will contact [facility] and let me know their response. Also, Access center is to check with [facility] around 3 pm today to check on bed availability.

## 2021-10-11 NOTE — PROGRESS NOTES
CRITICAL CARE PROGRESS NOTE      Patient:  Daniel Monsivais    Unit/Bed:3B-34/034-A  YOB: 1985  MRN: [id number]   PCP: BARBARA Dash CNP  Date of Admission: 10/9/2021  Chief Complaint:-     Assessment and Plan:      1. Acute ischemic stroke:   MRI brain (10/9/21)   - Small areas of abnormal signal in the diffusion-weighted images in the left corona radiata and left parietal lobe. There is corresponding high signal on the T2 and FLAIR sequences with only partial low signal on the ADC map. These are most likely small subacute infarcts. There is some associated petechial hemorrhage.   - Old microhemorrhages in the left frontal lobe, right parietal lobe and possibly left cerebellum. Dr. Tristan Agarwal on 10/9/2021 3:29 PM   CTA head and neck: no sign of aneurysm, no arterial venous malformation, no high grade stenosis or vessel cut off. Plan: Neurology consult appreciated - maintain blood pressure around 140, continue aspirin 81, Lipid panel WNL, Start Lipitor 40mg o.d, PT/ OT/SLP, neurologist follow the case    2. 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 Some decreased ejection mitral valve leaflets as well as degenerative change with resultant severe mitral regurgitation. Blood culture prelim positive Grampositive cocci in chains x 2. Pending sensitivity. Plan: Cont Vancomycin + Zosyn, Cardiologist consult - surgical intervention on a relatively urgent. Patient has been accepted by [facility] and wait for a bed. Dr. José Miguel Santillan is Cardiologist fellowship in [facility] is aware of the case. 3. Sepsis: SIRS 3/4 with source likely Infective Endocarditis. Lactate was normal, but elevated to 5. Pct is 1.98. s/p IVF. Plan: cont Vancomycin and Zosyn, await for culture and sensitivity.     4. Pulmonary edema: CXR (10/10) show Pulmonary vascular congestion.  Received Lasix 40 mg. CXR improve vascular congestion. Plan: hold Lasix due to hypotension.      3. Essential HTN: Currently hypotensive. Permissive hypertension due to #2. Hold home BP meds.     4. Electrolyte Imbalance: Resolved. Likely due to poor PO intake since March when symptoms began. Monitor BMP and Mg. Nephrologist follow the case  - Hyponatremia: Resolve 128 --> 136  - Hypokalemia: Resolve. 3.3 --> 4.1  - Hypomagnesemia: Resolve 1.5 --> 2.1     5. Elevated troponin: Due to demand. No evidence of acute ACS. Continue to monitor for symptoms.     6. Normocytic Anemia: Hgb 7.8 on admission. Was 15.0 in 2016. No obvious source of bleeding identified. Iron studies reveal iron deficiency. Folate and Vit B12 are normal. If IE confirmed on TTE, may be causing schistocytosis. Plan CBC daily, Transfuse if Hgb <7    INITIAL H AND P AND ICU COURSE:  Joy Costa is 43years old male who presented to the hospital due to right-sided weakness and altered mental status. Past medical history significant with coarctation of aorta with surgical correction at 14 months ago, hypertension. Patient reports having a fever every few weeks started in March. Patient traveled to [place] when he crawl under the spite of a new house and taking a product on the blair. Denies any bug bite or skin rash. Patient lost about 30 pounds since March, patient reports loss of appetite in which she has been eating small meals. Associated symptom include arthralgia. Denies any nausea vomiting. Patient has been follow-up with cardiologist Dr. Elen Herzog in the past to follow-up with his tachycardia and his history of coarctation of the aorta. His last TTE in 2015 which show MR with LVEF 55-65%. In the emergency room patient blood pressure 93/52. Code stroke was called, CTA head and neck with contrast, lumbar puncture and neurology will consult. Patient received 3.3 L normal saline for fluid resuscitation and ceftriaxone.   Patient was admitted and managed for sepsis, acute encephalopathy. MRI showed small subacute infract with some associated petechial hemorrhage. On 10/9, Neurologist was consulted for acute stroke. Nephrologist was consulted for electrolyte abnormalities. On 10/10, patient was transferred to ICU for hypotension require pressor support (Norepinephrine). Patient was accepted to [facility] by [address] surgeon Dr. John Avalos and Cardiologist Dr. Pelon Quispe when bed is available. On 10/10, patient is wean off pressor support. On 10/11, patien was transferred out of ICU.  Patient vital sign has been stable with /62, , RR 22, O2 Sat 97%    Past Medical History      Diagnosis Date    Hypertension         Past Surgical History        Procedure Laterality Date    CARDIAC CATHETERIZATION      CARDIAC SURGERY      COARCTATION OF AORTA REPAIR  06/1984       Current Medications    famotidine  20 mg Oral BID    atorvastatin  40 mg Oral Daily    [Held by provider] amLODIPine  5 mg Oral Daily    [Held by provider] hydrALAZINE  50 mg Oral BID    [Held by provider] losartan  100 mg Oral Daily    [Held by provider] metoprolol succinate  100 mg Oral Daily    sodium chloride flush  5-40 mL IntraVENous 2 times per day    [Held by provider] enoxaparin  40 mg SubCUTAneous Daily    IVPB builder  3,375 mg IntraVENous Q8H    vancomycin (VANCOCIN) intermittent dosing (placeholder)   Other RX Placeholder    vancomycin  1,000 mg IntraVENous Q12H    aspirin  81 mg Oral Daily     sodium chloride, potassium chloride **OR** potassium alternative oral replacement **OR** potassium chloride, sodium chloride flush, sodium chloride, acetaminophen **OR** acetaminophen, magnesium sulfate    IV Drips/Infusions   sodium chloride      sodium chloride      norepinephrine Stopped (10/10/21 1740)       Home Medications  Medications Prior to Admission: losartan (COZAAR) 100 MG tablet, Take 1 tablet by mouth daily  hydrALAZINE (APRESOLINE) 50 MG tablet, Take 1 tablet by mouth 2 times daily  amLODIPine (NORVASC) 5 MG tablet, Take 1 tablet by mouth daily  metoprolol succinate (TOPROL XL) 100 MG extended release tablet, Take 1 tablet by mouth daily  ALLERGIES: Patient has no known allergies. ROS   Constitutional: Negative for chills, diaphoresis, fever and unexpected weight change. HENT: Negative for congestion, dental problem, mouth sores, nosebleed  Respiratory: Negative for choking, chest tightness, wheezing and stridor. Cardiovascular: Negative for chest pain and palpitations. Gastrointestinal: Negative for anal bleeding, diarrhea, nausea and vomiting. Genitourinary: Negative for dysuria, flank pain, hematuria and urgency. Psychiatric/Behavioral: Negative for agitation, behavioral problems, confusion, decreased concentration, dysphoric mood and hallucinations. Scheduled Meds:   famotidine  20 mg Oral BID    atorvastatin  40 mg Oral Daily    [Held by provider] amLODIPine  5 mg Oral Daily    [Held by provider] hydrALAZINE  50 mg Oral BID    [Held by provider] losartan  100 mg Oral Daily    [Held by provider] metoprolol succinate  100 mg Oral Daily    sodium chloride flush  5-40 mL IntraVENous 2 times per day    [Held by provider] enoxaparin  40 mg SubCUTAneous Daily    IVPB builder  3,375 mg IntraVENous Q8H    vancomycin (VANCOCIN) intermittent dosing (placeholder)   Other RX Placeholder    vancomycin  1,000 mg IntraVENous Q12H    aspirin  81 mg Oral Daily     Continuous Infusions:   sodium chloride      sodium chloride      norepinephrine Stopped (10/10/21 1740)       PHYSICAL EXAMINATION:  T:  97.9.  P:  104. RR:  22. B/P:  114/62. O2 Sat: 97%. I/O: -1.1L  Body mass index is 21.47 kg/m². GCS:   15  General: awake and oriented to time, person and place. No acute distress  HEENT:  normocephalic and atraumatic. No scleral icterus. PERR  Neck: supple. No Thyromegaly. Lungs: clear to auscultation. No retractions  Cardiac: RRR. No JVD. Abdomen: soft. Nontender. Extremities:  No clubbing, cyanosis, or edema x 4. Vasculature: capillary refill < 3 seconds. Palpable dorsalis pedis pulses. Skin:  warm and dry. Psych:  Alert and oriented x3. Affect appropriate  Lymph:  No supraclavicular adenopathy. Neurologic:  No focal deficit. No seizures. Data: (All radiographs, tracings, PFTs, and imaging are personally viewed and interpreted unless otherwise noted).  Sodium 136, Potassium 3.5, chloride 101, bicarb 25, BUN/creatinine 6/0.9   WBC 6.0, hemoglobin 8.0, hematocrit 26.5, platelets 178   Telemetry shows sinus tachycardia   Echo 10/9: LV EF 60%, overal left ventricular function is normal. 1.3cm vegetation on anterior left class of mitral valve. Mitral valvuloplasty degenerative change with consideration of subacute endocarditis. Compared to echo in 2015 which showed some degree of mitral valve prolapse. Dr. Cheko Christian            Seen with multidisciplinary ICU team.  Meets Continued ICU Level Care Criteria:    [] Yes   [x] No - Transfer Planned to listed location:  [] HOSPITALIST CONTACTED-      Case and plan discussed with Dr. Nica Nino.         Electronically signed by Kuldeep Jeronimo DO  CRITICAL CARE SPECIALIST

## 2021-10-11 NOTE — CONSULTS
CONSULTATION NOTE :ID       Patient - Lorena Del Valle,  Age - 43 y.o.    - 1985      Room Number - 3B-34/034-A   MRN -  [id number]   [facility]  Date of Admission -  10/9/2021  1:45 AM  Patient's PCP: BARBARA Bey CNP     Requesting Physician: Ko Dhaliwal MD    REASON FOR CONSULTATION   Endocarditis  CHIEF COMPLAINT   Change in mental state with right-sided weakness    HISTORY OF PRESENT ILLNESS       This is a very pleasant 43 y.o. male who was admitted to the hospital with a chief complaints of sided weakness with a change in mental state. He has history of hypertension history of coarctation of aorta and mitral valve prolapse presented with above complaints. He was found to have endocarditis with vegetation on the mitral valve and the preliminary blood culture is growing Streptococcus species. I was asked to see this patient and assist with treatment. He dates back his illness to March when he started to have weakness lack of energy occasional night sweats and weight loss. He also reported some visual changes joint pains and swelling. No recent dental work no recent colonoscopy or invasive procedures. He is employed he does not smoke or do drugs. He takes medication for hypertension he had his coarctation of aorta repaired. No known history of endocarditis. His 2D echocardiogram shows larger than 1.3 cm vegetation with some area of calcification and there is some degree of destruction of the mitral valve as well as degenerative changes. He was started on IV Zosyn vancomycin by the ICU team.  He had occasional blurred vision.   PAST MEDICAL  HISTORY       Past Medical History:   Diagnosis Date    Hypertension        PAST SURGICAL HISTORY     Past Surgical History:   Procedure Laterality Date    CARDIAC CATHETERIZATION      CARDIAC SURGERY      COARCTATION OF AORTA REPAIR  1984         MEDICATIONS:       Scheduled Meds:   cefTRIAXone (ROCEPHIN) IV  2,000 mg IntraVENous Q24H    famotidine  20 mg Oral BID    atorvastatin  40 mg Oral Daily    [Held by provider] amLODIPine  5 mg Oral Daily    [Held by provider] hydrALAZINE  50 mg Oral BID    [Held by provider] losartan  100 mg Oral Daily    [Held by provider] metoprolol succinate  100 mg Oral Daily    sodium chloride flush  5-40 mL IntraVENous 2 times per day    [Held by provider] enoxaparin  40 mg SubCUTAneous Daily    aspirin  81 mg Oral Daily     Continuous Infusions:   sodium chloride      sodium chloride      norepinephrine Stopped (10/10/21 1740)     PRN Meds:sodium chloride, potassium chloride **OR** potassium alternative oral replacement **OR** potassium chloride, sodium chloride flush, sodium chloride, acetaminophen **OR** acetaminophen, magnesium sulfate  Allergies:   ALLERGIES:    Patient has no known allergies. SOCIAL HISTORY:     TOBACCO:   reports that he has never smoked. He has never used smokeless tobacco.     ETOH:   reports current alcohol use of about 3.0 standard drinks of alcohol per week. Patient currently lives with family       FAMILY HISTORY:         Problem Relation Age of Onset    High Blood Pressure Mother     Heart Disease Mother     Other Father         Not health related       REVIEW OF SYSTEMS:     Constitutional: + Fever, no night sweats, + fatigue, + weight loss. Around 30 pounds since March  Head: no head ache , no head injury, no migranes. Eye: no eye discharge, blurring of vision, no double vision,no eye pain. Ears: no hearing difficulty, no tinnitus  Mouth/throat: no ulceration, dental caries , dysphagia, no hoarseness and voice change  Respiratory: no cough no chest pain,no shortness of breath,no wheezing  CVS: no palpitation, no chest pain,   GI: no abdominal pain, no nausea , no vomiting, no constipation,no diarrhea.   SLIM: no dysuria, frequency and urgency, no hematuria, no kidney stones  Musculoskeletal: + Joint pain, swelling , stiffness,  Endocrine: no polyuria, polydipsia, no cold or heat intolerance  Hematology: no anemia, no easy brusing or bleeding, no hx of clotting disorder  Dermatology: no skin rash, no skin lesions, no pruritis,  Neurological:no headaches,no dizziness, no seizure, weakness on the right side as noted in HPI. Psychiatry: no depression, no anxiety,no panic attacks, no suicide ideation    PHYSICAL EXAM:     /62   Pulse 104   Temp 97.9 °F (36.6 °C) (Oral)   Resp 22   Ht 6' 2\" (1.88 m)   Wt 167 lb 3.2 oz (75.8 kg)   SpO2 97%   BMI 21.47 kg/m²   General apperance:  Awake, alert, not in distress. HEENT: pink conjunctiva, unicteric sclera, moist oral mucosa. Chest: Bilateral air entry  Cardiovascular:  RRR ,S1S2, he has loud holosystolic murmur at the mitral valve radiating to the back   abdomen:  Soft, non tender to palpation. Extremities: Trace edema. Skin:  Warm and dry. CNS: Oriented to person place and time        LABS:     CBC:   Recent Labs     10/09/21  0205 10/10/21  0110 10/11/21  0403   WBC 14.5* 9.3 6.0   HGB 7.8* 7.9* 8.0*    188 193     BMP:    Recent Labs     10/09/21  1525 10/10/21  0110 10/11/21  0403   * 136 136   K 4.5 4.1 3.5    104 101   CO2 26 24 25   BUN 9 7 6*   CREATININE 0.8 0.8 0.9   GLUCOSE 121* 135* 125*     Calcium:  Recent Labs     10/11/21  0403   CALCIUM 8.3*     Ionized Calcium:No results for input(s): IONCA in the last 72 hours. Magnesium:  Recent Labs     10/11/21  0403   MG 2.1     Phosphorus:No results for input(s): PHOS in the last 72 hours. BNP:No results for input(s): BNP in the last 72 hours.   Glucose:  Recent Labs     10/09/21  1440   POCGLU 141*     HgbA1C:   Recent Labs     10/09/21  0838   LABA1C 4.8     INR:   Recent Labs     10/09/21  0230   INR 1.42*     Hepatic:   Recent Labs     10/09/21  0205   ALKPHOS 72   ALT 14   AST 15   PROT 6.2   BILITOT 1.0   LABALBU 3.0*     Amylase and Lipase:  Recent Labs     10/09/21  1525   LACTA 1.6 Lactic Acid:   Recent Labs     10/09/21  1525   LACTA 1.6     Troponin:   Recent Labs     10/09/21  0205   CKTOTAL 24*     BNP: No results for input(s): BNP in the last 72 hours. Lipids:   Recent Labs     10/09/21  0643   CHOL 76*   TRIG 54   HDL 18   LDLCALC 47     ABGs: No results found for: PHART, PO2ART, KUH3VTM, KTU1MFN, BEART    Cultures:      CXR:       UA:   Recent Labs     10/09/21  0323   PHUR 6.5   COLORU YELLOW   PROTEINU NEGATIVE   BLOODU SMALL*   RBCUA 5-10   WBCUA 0-2   BACTERIA NONE SEEN   NITRU NEGATIVE   GLUCOSEU NEGATIVE   BILIRUBINUR NEGATIVE   UROBILINOGEN 1.0   KETUA NEGATIVE         IMAGING:    Micro:   Lab Results   Component Value Date    BC No growth-preliminary  10/09/2021       Problem list of patient      Patient Active Problem List   Diagnosis Code    Migraine headache with aura G43. 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 We will follow final culture report and sensitivity  Patient will be transferred to the [facility] for further investigation and treatment. Thank you for allowing me to participate in this patient's care.     Eileen Schwartz MD, MD,FACP 10/11/2021 3:45 PM

## 2021-10-12 LAB
ANION GAP SERPL CALCULATED.3IONS-SCNC: 7 MEQ/L (ref 8–16)
BASOPHILS # BLD: 0.9 %
BASOPHILS ABSOLUTE: 0.1 THOU/MM3 (ref 0–0.1)
BLOOD CULTURE, ROUTINE: ABNORMAL
BUN BLDV-MCNC: 5 MG/DL (ref 7–22)
CALCIUM SERPL-MCNC: 8.4 MG/DL (ref 8.5–10.5)
CHLORIDE BLD-SCNC: 103 MEQ/L (ref 98–111)
CO2: 27 MEQ/L (ref 23–33)
CREAT SERPL-MCNC: 0.9 MG/DL (ref 0.4–1.2)
EOSINOPHIL # BLD: 2.4 %
EOSINOPHILS ABSOLUTE: 0.2 THOU/MM3 (ref 0–0.4)
ERYTHROCYTE [DISTWIDTH] IN BLOOD BY AUTOMATED COUNT: 14.6 % (ref 11.5–14.5)
ERYTHROCYTE [DISTWIDTH] IN BLOOD BY AUTOMATED COUNT: 44.9 FL (ref 35–45)
GFR SERPL CREATININE-BSD FRML MDRD: > 90 ML/MIN/1.73M2
GLUCOSE BLD-MCNC: 104 MG/DL (ref 70–108)
HCT VFR BLD CALC: 26.2 % (ref 42–52)
HEMOGLOBIN: 7.7 GM/DL (ref 14–18)
IMMATURE GRANS (ABS): 0.17 THOU/MM3 (ref 0–0.07)
IMMATURE GRANULOCYTES: 2.1 %
LYMPHOCYTES # BLD: 13.4 %
LYMPHOCYTES ABSOLUTE: 1.1 THOU/MM3 (ref 1–4.8)
MCH RBC QN AUTO: 25.2 PG (ref 26–33)
MCHC RBC AUTO-ENTMCNC: 29.4 GM/DL (ref 32.2–35.5)
MCV RBC AUTO: 85.6 FL (ref 80–94)
MONOCYTES # BLD: 6.7 %
MONOCYTES ABSOLUTE: 0.5 THOU/MM3 (ref 0.4–1.3)
NUCLEATED RED BLOOD CELLS: 0 /100 WBC
ORGANISM: ABNORMAL
ORGANISM: ABNORMAL
PLATELET # BLD: 251 THOU/MM3 (ref 130–400)
PMV BLD AUTO: 8.8 FL (ref 9.4–12.4)
POTASSIUM REFLEX MAGNESIUM: 4.5 MEQ/L (ref 3.5–5.2)
RBC # BLD: 3.06 MILL/MM3 (ref 4.7–6.1)
SEG NEUTROPHILS: 74.5 %
SEGMENTED NEUTROPHILS ABSOLUTE COUNT: 6.1 THOU/MM3 (ref 1.8–7.7)
SODIUM BLD-SCNC: 137 MEQ/L (ref 135–145)
WBC # BLD: 8.2 THOU/MM3 (ref 4.8–10.8)

## 2021-10-12 PROCEDURE — 36415 COLL VENOUS BLD VENIPUNCTURE: CPT

## 2021-10-12 PROCEDURE — 80048 BASIC METABOLIC PNL TOTAL CA: CPT

## 2021-10-12 PROCEDURE — 6370000000 HC RX 637 (ALT 250 FOR IP): Performed by: STUDENT IN AN ORGANIZED HEALTH CARE EDUCATION/TRAINING PROGRAM

## 2021-10-12 PROCEDURE — 2580000003 HC RX 258

## 2021-10-12 PROCEDURE — 99232 SBSQ HOSP IP/OBS MODERATE 35: CPT | Performed by: STUDENT IN AN ORGANIZED HEALTH CARE EDUCATION/TRAINING PROGRAM

## 2021-10-12 PROCEDURE — 2580000003 HC RX 258: Performed by: INTERNAL MEDICINE

## 2021-10-12 PROCEDURE — 85025 COMPLETE CBC W/AUTO DIFF WBC: CPT

## 2021-10-12 PROCEDURE — 6360000002 HC RX W HCPCS: Performed by: INTERNAL MEDICINE

## 2021-10-12 PROCEDURE — 2140000000 HC CCU INTERMEDIATE R&B

## 2021-10-12 PROCEDURE — 6370000000 HC RX 637 (ALT 250 FOR IP): Performed by: NURSE PRACTITIONER

## 2021-10-12 PROCEDURE — 6370000000 HC RX 637 (ALT 250 FOR IP): Performed by: INTERNAL MEDICINE

## 2021-10-12 RX ADMIN — ONDANSETRON 4 MG: 2 INJECTION INTRAMUSCULAR; INTRAVENOUS at 16:20

## 2021-10-12 RX ADMIN — ASPIRIN 81 MG: 81 TABLET, CHEWABLE ORAL at 09:18

## 2021-10-12 RX ADMIN — CEFTRIAXONE SODIUM 2000 MG: 2 INJECTION, POWDER, FOR SOLUTION INTRAMUSCULAR; INTRAVENOUS at 16:18

## 2021-10-12 RX ADMIN — FAMOTIDINE 20 MG: 20 TABLET, FILM COATED ORAL at 21:44

## 2021-10-12 RX ADMIN — SODIUM CHLORIDE, PRESERVATIVE FREE 10 ML: 5 INJECTION INTRAVENOUS at 21:50

## 2021-10-12 RX ADMIN — FAMOTIDINE 20 MG: 20 TABLET, FILM COATED ORAL at 09:18

## 2021-10-12 RX ADMIN — ONDANSETRON 4 MG: 2 INJECTION INTRAMUSCULAR; INTRAVENOUS at 09:18

## 2021-10-12 RX ADMIN — ATORVASTATIN CALCIUM 40 MG: 40 TABLET, FILM COATED ORAL at 09:18

## 2021-10-12 RX ADMIN — SODIUM CHLORIDE, PRESERVATIVE FREE 10 ML: 5 INJECTION INTRAVENOUS at 09:18

## 2021-10-12 ASSESSMENT — PAIN SCALES - GENERAL
PAINLEVEL_OUTOF10: 0
PAINLEVEL_OUTOF10: 0

## 2021-10-12 NOTE — FLOWSHEET NOTE
[place] [address] PROGRESS NOTE      Patient: Tg Meade  Room #: 0S-25/897-E            YOB: 1985  Age: 43 y.o. Gender: male            Admit Date & Time: 10/9/2021  1:45 AM    Assessment:   Interventions:   Outcomes: In rounding patient was moved from 8 B to [facility] noted he did not have ACP docs.  engaged patient on 3 B and visited about completing an HCPOA to name patient's significant other as his decision maker. Patient was quite in agreement, and so  began the process. Significant other Erik Portillo arrived and came to the room about time for the patient to sign the forms.  reviewed the purpose and functioning of the HCPOA as regards decision makers as compared to kinship rules for POA. HCPOA completed for patient naming Erik Portillo as patient's POA. Plan:    1. Continue to visit and support as possible and desired. Electronically signed by Da Valentine on 10/11/2021 at 8:02 PM.  [address] [facility]  [phone]                 10/11/21 1950   Encounter Summary   Services provided to: Patient;Significant other   Referral/Consult From: [facility]   Support System Significant other;Children;Family members   Continue Visiting Yes  (10/11/2021  P SO)   Complexity of Encounter Low   Length of Encounter 1 hour   Spiritual Assessment Completed Yes   Advance Care Planning Yes   Routine   Type Initial   Assessment Calm; Approachable; Hopeful;Coping   Intervention Active listening;Explored feelings, thoughts, concerns;Explored coping resources;Nurtured hope;Prayer;Sustaining presence/ Ministry of presence   Outcome Acceptance;Comfort;Expressed gratitude;Engaged in conversation; Less anxious, less agitated

## 2021-10-12 NOTE — FLOWSHEET NOTE
10/11/21 2054   Vitals   Pulse 104   Heart Rate Source Monitor   Resp 20   BP (!) 105/57   MAP (mmHg) 73     Pt stable at this time. Denies any issues at this time. Family member Shad Silva currently at bedside.

## 2021-10-12 NOTE — PROGRESS NOTES
Report given to primary nurse for patient, Nancy Braga RN. Patient resting in chair comfortably, eyes open, respirations easy.  Electronically signed by Leon Mckee on 10/12/2021 at 12:50 PM

## 2021-10-12 NOTE — CARE COORDINATION
10/12/21 7:47 AM    Received callback from Trista Savage, [address] insurance Rep; states Meliton's insurance plan is not a PPO, and he can go anywhere he wants, there are no network limitations. Crystal, 3B CM, updated and will notify patient.

## 2021-10-12 NOTE — PROGRESS NOTES
Hospitalist Progress Note      Patient:  Daniel Monsivais    Unit/Bed:3B-34/034-A  YOB: 1985  MRN: [id number]   Acct: [de-identified]   PCP: BARBARA Dash CNP  Date of Admission: 10/9/2021    Assessment/Plan:    Acute ischemic stroke:   MRI brain (10/9/21)   - Small areas of abnormal signal in the diffusion-weighted images in the left corona radiata and left parietal lobe. There is corresponding high signal on the T2 and FLAIR sequences with only partial low signal on the ADC map. These are most likely small subacute infarcts. There is some associated petechial hemorrhage.   - Old microhemorrhages in the left frontal lobe, right parietal lobe and possibly left cerebellum. Dr. Tristan Agarwal on 10/9/2021 3:29 PM   CTA head and neck: no sign of aneurysm, no arterial venous malformation, no high grade stenosis or vessel cut off. Plan: Neurology consult appreciated - maintain blood pressure around 140, continue aspirin 81, Lipid panel WNL, Start Lipitor 40mg o.d, PT/ OT/SLP, neurologist follow the case     2. 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 Some decreased ejection mitral valve leaflets as well as degenerative change with resultant severe mitral regurgitation. Blood culturs with streptococcus oralis and mitis. Continue rocephin 2g daily. ID following. Patient declined [facility] preferring to have it completed at the [facility]. [facility] with [facility] bed availability. Patient remains on the transfer waitlist.  Dr. José Miguel Santillan is Cardiologist fellowship in Patient [facility] [place] [facility] clinic is aware of the case.     3. Sepsis: SIRS 3/4 with source likely Infective Endocarditis. Lactate was normal, but elevated to 5. Pct is 1.98. s/p IVF. Plan: Continue Rocephin 2g daily     4. Pulmonary edema: CXR (10/10) show Pulmonary vascular congestion. Neurologist was consulted for acute stroke. Nephrologist was consulted for electrolyte abnormalities.      On 10/10, patient was transferred to ICU for hypotension require pressor support (Norepinephrine). Patient was accepted to [facility] [place] 2[place] by [address] surgeon Dr. Barbi Hernandez and Cardiologist Dr. Christina Aburto when bed is available. On 10/10, patient is wean off pressor support. On 10/11, patien was transferred out of ICU. Patient vital sign has been stable with /62, , RR 22, O2 Sat 97%    10/11: Transferred out of the ICU to . Pending transfer to the [facility] [place]note 2[facility] for CT surgery. Subjective (past 24 hours):   Patient feels well. Denies shortness of breath, chest pain, or palpitations. Denies fevers or chills. He is interested in when he might be able to be transferred to the [facility] [place] for surgery. Past medical history, family history, social history and allergies reviewed again and is unchanged since admission. ROS (12 point review of systems completed. Pertinent positives noted.  Otherwise ROS is negative)     Medications:  Reviewed    Infusion Medications    sodium chloride      sodium chloride      norepinephrine Stopped (10/10/21 1740)     Scheduled Medications    cefTRIAXone (ROCEPHIN) IV  2,000 mg IntraVENous Q24H    famotidine  20 mg Oral BID    atorvastatin  40 mg Oral Daily    [Held by provider] amLODIPine  5 mg Oral Daily    [Held by provider] hydrALAZINE  50 mg Oral BID    [Held by provider] losartan  100 mg Oral Daily    [Held by provider] metoprolol succinate  100 mg Oral Daily    sodium chloride flush  5-40 mL IntraVENous 2 times per day    [Held by provider] enoxaparin  40 mg SubCUTAneous Daily    aspirin  81 mg Oral Daily     PRN Meds: ondansetron, sodium chloride, potassium chloride **OR** potassium alternative oral replacement **OR** potassium chloride, sodium chloride flush, sodium chloride, acetaminophen **OR** acetaminophen, magnesium sulfate      Intake/Output Summary (Last 24 hours) at 10/12/2021 1552  Last data filed at 10/12/2021 1100  Gross per 24 hour   Intake 1260 ml   Output 0 ml   Net 1260 ml       Diet:  ADULT DIET; Regular; Low Sodium (2 gm)    Exam:  BP (!) 103/57   Pulse 89   Temp 98.1 °F (36.7 °C) (Oral)   Resp 16   Ht 6' 2\" (1.88 m)   Wt 167 lb 3.2 oz (75.8 kg)   SpO2 95%   BMI 21.47 kg/m²   General appearance: No apparent distress, appears stated age and cooperative. Sitting in bedside chair. HEENT: Pupils equal, round, and reactive to light. Conjunctivae/corneas clear. Neck: Supple, with full range of motion. No jugular venous distention. Trachea midline. Respiratory:  Normal respiratory effort. Clear to auscultation, bilaterally without Rales/Wheezes/Rhonchi. Cardiovascular: Regular rate and rhythm. Harsh systolic ejection murmur with radiation throughout the precordium  Abdomen: Soft, non-tender, non-distended with normal bowel sounds. Musculoskeletal: passive and active ROM x 4 extremities. Skin: Skin color, texture, turgor normal.  No rashes or lesions. Neurologic:  Neurovascularly intact without any focal sensory/motor deficits. Cranial nerves: II-XII intact, grossly non-focal.  Psychiatric: Alert and oriented, thought content appropriate, normal insight  Capillary Refill: Brisk,< 3 seconds   Peripheral Pulses: +2 palpable, equal bilaterally     Labs:   Recent Labs     10/10/21  0110 10/11/21  0403 10/12/21  0457   WBC 9.3 6.0 8.2   HGB 7.9* 8.0* 7.7*   HCT 25.5* 26.5* 26.2*    193 251     Recent Labs     10/10/21  0110 10/11/21  0403 10/12/21  0457    136 137   K 4.1 3.5 4.5    101 103   CO2 24 25 27   BUN 7 6* 5*   CREATININE 0.8 0.9 0.9   CALCIUM 8.0* 8.3* 8.4*     No results for input(s): AST, ALT, BILIDIR, BILITOT, ALKPHOS in the last 72 hours. No results for input(s): INR in the last 72 hours. No results for input(s): [place] in the last 72 hours.     Microbiology:    Blood culture #1:   Lab Results   Component Value Date    BC No growth-preliminary  10/09/2021    [facility]  10/09/2021     sensitivity done-previous specimen see blood culture collected 10/09/2021 at 02:20        Blood culture #2:No results found for: Cheng Gordon    Organism:  Lab Results   Component Value Date    ORG Streptococcus mitis/Streptococcus oralis 10/09/2021         Lab Results   Component Value Date    LABGRAM  10/09/2021     Few segmented neutrophils observed. No epithelial cells observed. No bacteria seen. MRSA culture only:No results found for: [address]     Urine culture:   Lab Results   Component Value Date    LABURIN No growth-preliminary  No growth   11/24/2016       Respiratory culture: No results found for: CULTRESP    Aerobic and Anaerobic :  No results found for: LABAERO  Lab Results   Component Value Date    LABANAE No growth-preliminary  10/09/2021       Urinalysis:      Lab Results   Component Value Date    NITRU NEGATIVE 10/09/2021    WBCUA 0-2 10/09/2021    BACTERIA NONE SEEN 10/09/2021    RBCUA 5-10 10/09/2021    BLOODU SMALL 10/09/2021    SPECGRAV 1.009 11/24/2016    GLUCOSEU NEGATIVE 10/09/2021       Radiology:  VL DUP LOWER EXTREMITY VENOUS BILATERAL   Final Result      No evidence of deep venous thrombosis in either lower extremity. **This report has been created using voice recognition software. It may contain minor errors which are inherent in voice recognition technology. **             Final report electronically signed by Dr. Norman Sylvester on 10/10/2021 10:39 AM      XR CHEST PORTABLE   Final Result   Pulmonary vascular congestion which has somewhat improved since the previous study. **This report has been created using voice recognition software. It may contain minor errors which are inherent in voice recognition technology. **      Final report electronically signed by Dr Herman Crabtree on 10/10/2021 2:05 AM      XR CHEST PORTABLE   Final Result   Interval development of moderate pulmonary vascular congestion. **This report has been created using voice recognition software. It may contain minor errors which are inherent in voice recognition technology. **      Final report electronically signed by Dr Jessica Lopez on 10/9/2021 6:53 PM      MRI BRAIN W WO CONTRAST   Final Result       1. Small areas of abnormal signal in the diffusion-weighted images in the left corona radiata and left parietal lobe. There is corresponding high signal on the T2 and FLAIR sequences with only partial low signal on the ADC map. These are most likely    small subacute infarcts. There is some associated petechial hemorrhage. 2. Old microhemorrhages in the left frontal lobe, right parietal lobe and possibly left cerebellum. **This report has been created using voice recognition software. It may contain minor errors which are inherent in voice recognition technology. **         Final report electronically signed by Dr. Polo Lucia on 10/9/2021 3:29 PM      CT CHEST WO CONTRAST   Final Result   Impression:      Mild posterior lower lobe atelectasis and posterior gravity dependent    edema. No focal consolidation or pleural effusion. Heart size is mildly enlarged. There is mild pulmonary vascular congestion    and interstitial edema and peripheral septal lines best visualized    posteriorly in the sagittal projection      This document has been electronically signed by: Mila Banks MD on    10/09/2021 05:12 AM      All CTs at this facility use dose modulation techniques and iterative    reconstructions, and/or weight-based dosing   when appropriate to reduce radiation to a low as reasonably achievable.       CT ABDOMEN PELVIS WO CONTRAST Additional Contrast? None   Final Result      XR CHEST PORTABLE   Final Result   Impression:   Chronic bilateral central bronchial large airway inflammatory thickening,    otherwise normal      This document has been Physician          Sofia Cha DO   Age           43 year(s)     Sonographer        MADELEINE Noe, RDCS,                                                  RDMS, RVT                                Interpreting       Patt Holloway MD                               Physician  Procedure Type of Study   TTE procedure:ECHOCARDIOGRAM COMPLETE 2D W DOPPLER W COLOR. Procedure Date Date: 10/09/2021 Start: 04:12 PM Study Location: Bedside Technical Quality: Adequate visualization Indications:Coarctation of the aorta, Heart murmur and Fever. Additional Medical History:history of coarctation of aorta repaired at 14 months, sepsis, stroke, systolic murmur Patient Status: STAT Height: 74 inches Weight: 163 pounds BSA: 1.99 m^2 BMI: 20.93 kg/m^2 BP: 97/54 mmHg  Conclusions   Summary  This case was discussed with Dr Ludivina Aranda over the phone  Ejection fraction is visually estimated at 60%. Overall left ventricular function is normal.  Moderate size mobile vegetation was visualized on anterior leaflet of  mitral valve. This vegetation is irregular in shape with variable diameter  but larger than 1.3 cm. There appears to be some degree of destruction of  the MV leaflets as well degenerative changes with resultant severe Mitral  regurgitation. Consideration of sub acute endocarditis, as the vegetation  seems to have some degree of calcification. Previous Echo from 2015  showing some degree of prolapse of the MV anterior leaflet ( as per the  echo report , images not available for review )   Signature   ----------------------------------------------------------------  Electronically signed by Patt Holloway MD (Interpreting  physician) on 10/09/2021 at 07:40 PM  ----------------------------------------------------------------   Findings   Mitral Valve  Moderate size mobile vegetation was visualized on anterior leaflet of  mitral valve.  This vegetation is irregular in shape with variable diameter but larger than 1.3 cm. There appears to be some degree of destruction of  the MV leaflets as well degenerative changes with resultant severe Mitral  regurgitation. Consideration of sub acute endocarditis, as the vegetation  seems to have some degree of calcification. Previous Echo from 2015  showing some degree of prolapse of the MV anterior leaflet ( as per the  echo report , images not available for review )   Aortic Valve  Aortic valve appears tricuspid. Aortic valve leaflets are somewhat thickened. Tricuspid Valve  Trivial tricuspid regurgitation visualized. Pulmonic Valve  The pulmonic valve was not well visualized . Trivial pulmonic regurgitation visualized. Left Atrium  Mildly dilated left atrium. Left Ventricle  Ejection fraction is visually estimated at 60%. Overall left ventricular function is normal.   Right Atrium  Right atrial size was normal.   Right Ventricle  The right ventricular size was normal with normal systolic function and  wall thickness. Pericardial Effusion  The pericardium was normal in appearance with no evidence of a pericardial  effusion. Pleural Effusion  No evidence of pleural effusion. Aorta / Great Vessels  -Aortic root dimension within normal limits.  -The Pulmonary artery is within normal limits. -IVC size is within normal limits with normal respiratory phasic changes.   M-Mode/2D Measurements & Calculations   LV Diastolic   LV Systolic Dimension:    AV Cusp Separation: 1.8 cmLA  Dimension: 6.8 4.5 cm                    Dimension: 4.2 cmAO Root  cm             LV Volume Diastolic: 533  Dimension: 3.1 cmLA Area: 22.1  LV FS:33.8 %   ml                        cm^2  LV PW          LV Volume Systolic: 00.2  Diastolic: 1.2 ml  cm             LV EDV/LV EDV Index: 239  Septum         ml/120 m^2LV ESV/LV ESV   RV Diastolic Dimension: 2.7 cm  Diastolic: 0.8 Index: 14.1 ml/46 m^2  cm             EF Calculated: 61.3 %     LA/Aorta: 1.35 Ascending Aorta: 3.1 cm                                           LA volume/Index: 63.4 ml /32m^2  Doppler Measurements & Calculations   MV Peak E-Wave: 151 cm/s AV Peak Velocity: 217   LVOT Peak Velocity: 110                           cm/s                    cm/s  MV Peak Gradient: 9.12   AV Peak Gradient: 18.84 LVOT Peak Gradient: 5  mmHg                     mmHg                    mmHg   MV Deceleration Time:                            TV Peak E-Wave: 59.6 cm/s  151 msec                                         TV Peak A-Wave: 67.8 cm/s                            AV P1/2t: 308 msec      TV Peak Gradient: 1.42                                                   mmHg                                                   TR Velocity:316 cm/s                                                   TR Gradient:39.94 mmHg  MR Velocity: 476 cm/s    AV DVI (Vmax):0.51      PV Peak Velocity: 73.3                                                   cm/s                                                   PV Peak Gradient: 2.15                                                   mmHg                                                    AZ ED Velocity: 145 cm/s  [email or web address] [id number]    CT ABDOMEN PELVIS WO CONTRAST Additional Contrast? None    Result Date: 10/9/2021  Exam: CT Abdomen and Pelvis Without IV Contrast Comparison: None Findings: Bilateral basilar interstitial pulmonary edema and posterior costophrenic sulcus atelectasis  The liver density is homogeneous  No biliary abnormalities  The spleen is normal in size  No pancreatic ductal dilatation  No hydronephrosis. Delayed contrast imaging shows no urinary tract obstruction. Normal bowel caliber  The appendix is not visualized  No free fluid/free air  No vascular abnormalities. No adenopathy  Bladder outline is smooth.  There is degenerative narrowing of the L5-S1 disc space Impression  Unremarkable CT of the abdomen and pelvis. This document has been electronically signed by: Zaid Krause MD on 10/09/2021 05:18 AM All CTs at this facility use dose modulation techniques and iterative reconstructions, and/or weight-based dosing when appropriate to reduce radiation to a low as reasonably achievable. CTA HEAD W WO CONTRAST (CODE STROKE)    Result Date: 10/9/2021  CTA HEAD, bolus contrast injection. 3D reconstructions and MPRs[de-identified] Comparison: None Right Carotid Siphon: Normal Right Anterior Cerebral Artery: A1 and A2 segments are unremarkable. There is peripheral cortical enhancement. Right Middle Cerebral Artery: M1 and M2 segments are unremarkable. There is peripheral cortical enhancement. Right Posterior Cerebral Artery: P1 and P2 segments are unremarkable. There is peripheral enhancement Left Carotid Siphon: Normal Left Anterior Cerebral Artery: A1 and A2 segments are unremarkable. There is peripheral cortical enhancement. Left Middle Cerebral Artery: M1 and M2 segments are unremarkable. There is peripheral cortical enhancement. Left Posterior Cerebral Artery: P1 and P2 segments are unremarkable. There is peripheral cortical enhancement. Basilar Artery: Normal Venous drainage:Normal     Impression: No signs of aneurysm. No signs of arterial venous malformation. No high grade stenosis or vessel cut off. This document has been electronically signed by: Zaid Krause MD on 10/09/2021 02:56 AM All CTs at this facility use dose modulation techniques and iterative reconstructions, and/or weight-based dosing when appropriate to reduce radiation to a low as reasonably achievable. CT HEAD WO CONTRAST    Result Date: 10/9/2021  ** ADDENDUM #1 ** This report was discussed with Taylor Stallworth RN on Oct 09, 2021 02:20:00 EDT. This document has been electronically signed by: Rubin Spencer on 10/09/2021 02:20 AM ** ORIGINAL REPORT ** CT Head Without Contrast: Comparison: None. Findings: Cortical Sulci symmetric. Basal ganglia are unremarkable Mass effect: No shift in midline structures Intracranial bleeding: No intraparenchymal bleeding or abnormal extra axial blood fluid collections Pituitary: Normal in size Visualized sinuses: Clear Orbital structures: Unremarkable Calvarium: No depressed fractures. Impression:  Unremarkable CT of the head. ASPECTS score 10, NORMAL This document has been electronically signed by: Sohail Jurado MD on 10/09/2021 02:17 AM All CTs at this facility use dose modulation techniques and iterative reconstructions, and/or weight-based dosing when appropriate to reduce radiation to a low as reasonably achievable. CTA NECK W WO CONTRAST (CODE STROKE)    Result Date: 10/9/2021  CTA Neck , Bolus contrast injection, 3D reconstructions and MPRs: Comparison: Findings: Soft tissues of the neck are unremarkable. Superior aorta and branch vessels are unremarkable. Right carotid: No stenosis (NASCET) Right vertebral: No stenosis Left Carotid: No stenosis (NASCET) Left Vertebral: No stenosis     Impression: No aneurysm or dissection. No stenosis This document has been electronically signed by: Sohail Jurado MD on 10/09/2021 03:05 AM All CTs at this facility use dose modulation techniques and iterative reconstructions, and/or weight-based dosing when appropriate to reduce radiation to a low as reasonably achievable. Carotid stenosis and measurements are in accordance with NASCET criteria. CT CHEST WO CONTRAST    Result Date: 10/9/2021  CT Chest without IV contrast: Comparison: 08/21/2018 Findings: Heart size is mildly enlarged with no pericardial effusion Aorta diameter is normal. Pulmonary artery diameter is unremarkable. Lymph nodes: No adenopathy. Trachea and Esophagus: Unremarkable. No pneumomediastinum Lungs: There is mild posterior bilateral lower lobe atelectasis and gravity dependent edema with no focal consolidation Pleura: [No pleural effusion.  Skeletal: No fractures. Below the diaphragm: Regional visceral organs are unremarkable. Impression: Mild posterior lower lobe atelectasis and posterior gravity dependent edema. No focal consolidation or pleural effusion. Heart size is mildly enlarged. There is mild pulmonary vascular congestion and interstitial edema and peripheral septal lines best visualized posteriorly in the sagittal projection This document has been electronically signed by: Tommy Menezes MD on 10/09/2021 05:12 AM All CTs at this facility use dose modulation techniques and iterative reconstructions, and/or weight-based dosing when appropriate to reduce radiation to a low as reasonably achievable. XR CHEST PORTABLE    Result Date: 10/10/2021  PROCEDURE: XR CHEST PORTABLE CLINICAL INFORMATION: 77-year-old male, pulmonary edema follow-up. . COMPARISON: 10/09/2021 6:31 PM. TECHNIQUE: AP upright view of the chest was obtained. FINDINGS: There is redemonstration of pulmonary vascular congestion which has slightly improved when compared to the prior study. The heart is normal in size. There is no significant pleural effusion or pneumothorax. Visualized portions of the upper abdomen are within normal limits. The osseous structures are intact. No acute fractures or suspicious osseous lesions. Pulmonary vascular congestion which has somewhat improved since the previous study. **This report has been created using voice recognition software. It may contain minor errors which are inherent in voice recognition technology. ** Final report electronically signed by Dr Demetria Vaughan on 10/10/2021 2:05 AM    XR CHEST PORTABLE    Result Date: 10/9/2021  PROCEDURE: XR CHEST PORTABLE CLINICAL INFORMATION: 77-year-old male with shortness of breath and hypoxia. COMPARISON: No prior study. TECHNIQUE: AP upright view of the chest was obtained.  FINDINGS: There is moderate pulmonary vascular congestion which has developed since the previous exam. The heart is normal in size. There is no significant pleural effusion or pneumothorax. Visualized portions of the upper abdomen are within normal limits. The osseous structures are intact. No acute fractures or suspicious osseous lesions. Interval development of moderate pulmonary vascular congestion. **This report has been created using voice recognition software. It may contain minor errors which are inherent in voice recognition technology. ** Final report electronically signed by Dr Sarai Fontaine on 10/9/2021 6:53 PM    XR CHEST PORTABLE    Result Date: 10/9/2021  1 view chest x-ray. Comparison: 11/04/2016 plain films, CT of the chest 08/05/2021. Findings: No consolidation or effusion. Heart size normal. No acute fracture. Impression: Chronic bilateral central bronchial large airway inflammatory thickening, otherwise normal This document has been electronically signed by: Anastacia Longo MD on 10/09/2021 03:16 AM    VL DUP LOWER EXTREMITY VENOUS BILATERAL    Result Date: 10/10/2021  PROCEDURE: VL DUP LOWER EXTREMITY VENOUS BILATERAL CLINICAL INFORMATION: Indication provided by the ordering nurse practitioner is \"r/o DVT. \" TECHNIQUE: High-resolution duplex ultrasound with spectral analysis of the bilateral lower extremities was performed. The common femoral, femoral, popliteal and calf vein segments were studied to the ankles. COMPARISON: None. FINDINGS: There is normal compressibility with no evidence of thrombus. Flow study shows normal color flow, augmentation and phasic response. No evidence of deep venous thrombosis in either lower extremity. **This report has been created using voice recognition software. It may contain minor errors which are inherent in voice recognition technology. **  Final report electronically signed by Dr. Gena Cabral on 10/10/2021 10:39 AM    MRI BRAIN W WO CONTRAST    Result Date: 10/9/2021  PROCEDURE: MRI BRAIN W WO CONTRAST CLINICAL INFORMATIONaltered mental status with neurological changes. Sudden onset diffuse right-sided weakness and numbness. Slurred speech. Symptoms have now resolved. COMPARISON: No prior study. TECHNIQUE: Multiplanar and multiple spin echo T1 and T2-weighted images were obtained through the brain before and after the administration of intravenous contrast. FINDINGS: On the diffusion-weighted images, there is a punctate area of abnormal high signal in the posterior medial corona radiata on the left. There is also a small focus in the white matter of the left parietal lobe. There is a small area involving the cortex and underlying white matter in the left parietal lobe. This is wedge-shaped. There is some subtle associated susceptibility artifact. There is only partial low signal on the ADC map. There is high signal on the FLAIR and T2-weighted sequences. Separate from this, there are few other scattered foci of abnormal signal in the white matter the brain. The largest is in the white matter of the right frontal lobe. There is no associated restricted diffusion. The brain volume is normal.There are no intra-or extra-axial collections. There is no hydrocephalus, midline shift or mass effect. On the gradient echo T2-weighted images, there are 2 old microhemorrhages in the left frontal lobe. There is a small old microhemorrhage in the posterior right frontal lobe. There is a possible old microhemorrhage in the left cerebellum. There is no abnormal enhancement in the brain. The major intracranial vascular flow voids are present. The midline craniocervical junction structures are normal.  The brainstem and pituitary gland are normal.      1. Small areas of abnormal signal in the diffusion-weighted images in the left corona radiata and left parietal lobe. There is corresponding high signal on the T2 and FLAIR sequences with only partial low signal on the ADC map. These are most likely small subacute infarcts. There is some associated petechial hemorrhage.  2. Old microhemorrhages in the left frontal lobe, right parietal lobe and possibly left cerebellum. **This report has been created using voice recognition software. It may contain minor errors which are inherent in voice recognition technology. ** Final report electronically signed by Dr. Angella Yip on 10/9/2021 3:29 PM      Electronically signed by Val Baldwin DO on 10/12/2021 at 3:52 PM

## 2021-10-12 NOTE — CARE COORDINATION
Discharge Planning Update: Following for Endocarditis. Awaiting bed availability at [facility]. IV Rocephin 2 g daily continues. From home with Tez LASSITER. Planning transfer to [facility] when bed available. This CM informed pt that insurance coverage is good at [address] [place] [address].

## 2021-10-12 NOTE — PROGRESS NOTES
Progress note: Infectious diseases    Patient - Tong Jeffrey,  Age - 43 y.o.    - 1985      Room Number - 3B-34/034-A   MRN -  [id number]   Acct # - [de-identified]  Date of Admission -  10/9/2021  1:45 AM    SUBJECTIVE:   No new issues. OBJECTIVE   VITALS    height is 6' 2\" (1.88 m) and weight is 167 lb 3.2 oz (75.8 kg). His oral temperature is 98.6 °F (37 °C). His blood pressure is 115/63 and his pulse is 96. His respiration is 18 and oxygen saturation is 100%.        Wt Readings from Last 3 Encounters:   10/11/21 167 lb 3.2 oz (75.8 kg)   21 167 lb 12.8 oz (76.1 kg)   21 168 lb 8 oz (76.4 kg)       I/O (24 Hours)    Intake/Output Summary (Last 24 hours) at 10/12/2021 1132  Last data filed at 10/12/2021 0800  Gross per 24 hour   Intake 1060 ml   Output 0 ml   Net 1060 ml       General Appearance  Awake, alert, oriented,  not  In acute distress  HEENT - normocephalic, atraumatic, pink conjunctiva,  anicteric sclera  Neck - Supple, no mass  Lungs -  Bilateral good air entry, no rhonchi, no wheeze  Cardiovascular - RRR, Harsh holosystolic murmur mitral area radiating to the back  Abdomen - soft, not distended, nontender,   Neurologic -alert and oriented to place, person, time, situation  Skin - No bruising or bleeding, subtle splinter hemorrhage RUE  Extremities - No edema, no cyanosis, clubbing     MEDICATIONS:      cefTRIAXone (ROCEPHIN) IV  2,000 mg IntraVENous Q24H    famotidine  20 mg Oral BID    atorvastatin  40 mg Oral Daily    [Held by provider] amLODIPine  5 mg Oral Daily    [Held by provider] hydrALAZINE  50 mg Oral BID    [Held by provider] losartan  100 mg Oral Daily    [Held by provider] metoprolol succinate  100 mg Oral Daily    sodium chloride flush  5-40 mL IntraVENous 2 times per day    [Held by provider] enoxaparin  40 mg SubCUTAneous Daily    aspirin  81 mg Oral Daily      sodium chloride      sodium chloride      norepinephrine Stopped (10/10/21 1740)     ondansetron, sodium chloride, potassium chloride **OR** potassium alternative oral replacement **OR** potassium chloride, sodium chloride flush, sodium chloride, acetaminophen **OR** acetaminophen, magnesium sulfate      LABS:     CBC:   Recent Labs     10/10/21  0110 10/11/21  0403 10/12/21  0457   WBC 9.3 6.0 8.2   HGB 7.9* 8.0* 7.7*    193 251     BMP:    Recent Labs     10/10/21  0110 10/11/21  0403 10/12/21  0457    136 137   K 4.1 3.5 4.5    101 103   CO2 24 25 27   BUN 7 6* 5*   CREATININE 0.8 0.9 0.9   GLUCOSE 135* 125* 104     Calcium:  Recent Labs     10/12/21  0457   CALCIUM 8.4*     Ionized Calcium:No results for input(s): IONCA in the last 72 hours. Magnesium:  Recent Labs     10/11/21  0403   MG 2.1     Phosphorus:No results for input(s): PHOS in the last 72 hours. BNP:No results for input(s): BNP in the last 72 hours. Glucose:  Recent Labs     10/09/21  1440   POCGLU 141*    Amylase and Lipase:  Recent Labs     10/09/21  1525   LACTA 1.6     Lactic Acid:   Recent Labs     10/09/21  1525   LACTA 1.6     Troponin: No results for input(s): CKTOTAL, CKMB, TROPONINI in the last 72 hours. BNP: No results for input(s): BNP in the last 72 hours. CULTURES:   UA: No results for input(s): SPECGRAV, PHUR, COLORU, CLARITYU, MUCUS, PROTEINU, BLOODU, RBCUA, WBCUA, BACTERIA, NITRU, GLUCOSEU, BILIRUBINUR, UROBILINOGEN, KETUA, LABCAST, LABCASTTY, AMORPHOS in the last 72 hours. Invalid input(s): CRYSTALS  Micro:   Lab Results   Component Value Date    BC No growth-preliminary  10/09/2021    [facility]  10/09/2021     sensitivity done-previous specimen see blood culture collected 10/09/2021 at 02:20           Problem list of patient:     Patient Active Problem List   Diagnosis Code    Migraine headache with aura G43. 560    Systolic murmur P59.5    H/O coarctation of aorta, repair at 12 months old Z80.71   

## 2021-10-12 NOTE — PROGRESS NOTES
Spoke with patient and spouse at bedside after their conversation with Dr. Stewart Moulton at bedside and they are planning to complete HERMINIA at [facility] when transfer made. This RN updated endo regarding decision. 0767: received phone call from [facility]; no bed available at this time. 7823: call placed to Aspen Walker at [facility] and connection made with cardiac ICU for discussion regarding HERMINIA and they stated that their cardiology team will complete upon patient arrival prior to surgery as this is their protocol.

## 2021-10-12 NOTE — PROGRESS NOTES
Physician Progress Note      PATIENT:               Diogenes Norris  CSN #:                  [id number]  :                       1985  ADMIT DATE:       10/9/2021 1:45 AM  DISCH DATE:  RESPONDING  PROVIDER #:        Linh Hassan          QUERY TEXT:    Pt admitted with sepsis and noted to have pulmonary edema. If possible, please   document in the progress notes and discharge summary if you are evaluating   and/or treating any of the following: The medical record reflects the following:  Risk Factors: pulmonary edema  Clinical Indicators: CT chest: There is mild pulmonary vascular congestion and   interstitial edema and peripheral septal lines best visualized posteriorly in   the sagittal projection; documentation of pulmonary edema in progress notes  Treatment: Labs, imaging, IV Lasix  Options provided:  -- Noncardiogenic acute pulmonary edema  -- Acute pulmonary edema due to heart disease  -- Chronic pulmonary edema due to heart disease  -- Other - I will add my own diagnosis  -- Disagree - Not applicable / Not valid  -- Disagree - Clinically unable to determine / Unknown  -- Refer to Clinical Documentation Reviewer    PROVIDER RESPONSE TEXT:    This patient has acute pulmonary edema due to heart disease. Query created by: Jeferson Steven on 10/11/2021 9:39 AM      QUERY TEXT:    Pt admitted with sepsis and noted to have hypotension. If possible, please   document in the progress notes and discharge summary if you are evaluating   and/or treating any of the following: The medical record reflects the following:  Risk Factors: sepsis  Clinical Indicators: hypotension (86/48 at lowest with MAP 64);   Treatment: Levophed drip, monitoring vitals in ICU, IVF  Options provided:  -- Septic Shock  -- Hypovolemic Shock  -- Hypovolemia without Shock  -- Hypotension without Shock  -- Other - I will add my own diagnosis  -- Disagree - Not applicable / Not valid  -- Disagree - Clinically unable to determine / Unknown  -- Refer to Clinical Documentation Reviewer    PROVIDER RESPONSE TEXT:    This patient has hypovolemia without shock.     Query created by: Nova Villafana on 10/11/2021 9:43 AM      Electronically signed by:  Ariel Lynn 10/12/2021 6:17 AM

## 2021-10-12 NOTE — PROGRESS NOTES
Lab called requesting for sputum to be sent to lab for ordered resp cultures. Pt not producing sputum at this time.

## 2021-10-12 NOTE — ACP (ADVANCE CARE PLANNING)
Advance Care Planning     Advance Care Planning Inpatient Note  [facility] Department    Today's Date: 10/11/2021  Unit: [facility] CCU-STEPDOWN 3B    Received request from rounding. Upon review of chart and communication with care team, patient's decision making abilities are not in question. . Patient and Significant Other was/were present in the room during visit. Goals of ACP Conversation:  Discuss advance care planning documents  Facilitate a discussion related to patient's goals of care as they align with the patient's values and beliefs. Health Care Decision Makers:       Primary Decision Maker: Muna Mchughjonahs Partner - [phone]       Summary:  Completed P[address] Decision [facility]      Advance Care Planning Documents (Patient Wishes):  Healthcare Power of /Advance Directive Appointment of Health Care Agent     Assessment:  Patient was clearly alert and oriented X 4. Patient was definitive about choosing his significant other Tamiko Martins as his POA. Patieint deferred makoing other EOL choices at this time. Patient and significant other were in accord on this and in agreement on the choice. Interventions:  Provided education on documents for clarity and greater understanding  Assisted in the completion of documents according to patient's wishes at this time  Encouraged ongoing ACP conversation with future decision makers and loved ones    Care Preferences Communicated: These topics were not diswcuss at this time. Patient deferred making choices on these topics now. Outcomes/Plan:  New advance directive completed. Returned original document(s) to patient, as well as copies for distribution to appointed agents  Copy of advance directive given to staff to scan into medical record.     Electronically signed by Charleston Callow on 10/11/2021 at 8:08 PM

## 2021-10-13 LAB
ANA SCREEN: NORMAL
ANION GAP SERPL CALCULATED.3IONS-SCNC: 8 MEQ/L (ref 8–16)
BASOPHILS # BLD: 0.8 %
BASOPHILS ABSOLUTE: 0.1 THOU/MM3 (ref 0–0.1)
BUN BLDV-MCNC: 6 MG/DL (ref 7–22)
CALCIUM SERPL-MCNC: 8.8 MG/DL (ref 8.5–10.5)
CHLORIDE BLD-SCNC: 101 MEQ/L (ref 98–111)
CO2: 27 MEQ/L (ref 23–33)
CREAT SERPL-MCNC: 1 MG/DL (ref 0.4–1.2)
EOSINOPHIL # BLD: 2 %
EOSINOPHILS ABSOLUTE: 0.2 THOU/MM3 (ref 0–0.4)
ERYTHROCYTE [DISTWIDTH] IN BLOOD BY AUTOMATED COUNT: 14.6 % (ref 11.5–14.5)
ERYTHROCYTE [DISTWIDTH] IN BLOOD BY AUTOMATED COUNT: 44.2 FL (ref 35–45)
GFR SERPL CREATININE-BSD FRML MDRD: 83 ML/MIN/1.73M2
GLUCOSE BLD-MCNC: 104 MG/DL (ref 70–108)
HCT VFR BLD CALC: 26.9 % (ref 42–52)
HEMOGLOBIN: 8.3 GM/DL (ref 14–18)
IMMATURE GRANS (ABS): 0.29 THOU/MM3 (ref 0–0.07)
IMMATURE GRANULOCYTES: 3.8 %
LYMPHOCYTES # BLD: 16.8 %
LYMPHOCYTES ABSOLUTE: 1.3 THOU/MM3 (ref 1–4.8)
MAGNESIUM: 1.9 MG/DL (ref 1.6–2.4)
MCH RBC QN AUTO: 26.2 PG (ref 26–33)
MCHC RBC AUTO-ENTMCNC: 30.9 GM/DL (ref 32.2–35.5)
MCV RBC AUTO: 84.9 FL (ref 80–94)
MONOCYTES # BLD: 7.3 %
MONOCYTES ABSOLUTE: 0.6 THOU/MM3 (ref 0.4–1.3)
NUCLEATED RED BLOOD CELLS: 0 /100 WBC
PLATELET # BLD: 251 THOU/MM3 (ref 130–400)
PMV BLD AUTO: 8.2 FL (ref 9.4–12.4)
POTASSIUM REFLEX MAGNESIUM: 4.2 MEQ/L (ref 3.5–5.2)
POTASSIUM SERPL-SCNC: 4.2 MEQ/L (ref 3.5–5.2)
RBC # BLD: 3.17 MILL/MM3 (ref 4.7–6.1)
SEG NEUTROPHILS: 69.3 %
SEGMENTED NEUTROPHILS ABSOLUTE COUNT: 5.3 THOU/MM3 (ref 1.8–7.7)
SODIUM BLD-SCNC: 136 MEQ/L (ref 135–145)
WBC # BLD: 7.7 THOU/MM3 (ref 4.8–10.8)

## 2021-10-13 PROCEDURE — 97535 SELF CARE MNGMENT TRAINING: CPT

## 2021-10-13 PROCEDURE — 99233 SBSQ HOSP IP/OBS HIGH 50: CPT | Performed by: INTERNAL MEDICINE

## 2021-10-13 PROCEDURE — 2580000003 HC RX 258: Performed by: INTERNAL MEDICINE

## 2021-10-13 PROCEDURE — 2140000000 HC CCU INTERMEDIATE R&B

## 2021-10-13 PROCEDURE — 2580000003 HC RX 258

## 2021-10-13 PROCEDURE — 99232 SBSQ HOSP IP/OBS MODERATE 35: CPT | Performed by: PHYSICIAN ASSISTANT

## 2021-10-13 PROCEDURE — 85025 COMPLETE CBC W/AUTO DIFF WBC: CPT

## 2021-10-13 PROCEDURE — 83735 ASSAY OF MAGNESIUM: CPT

## 2021-10-13 PROCEDURE — 36415 COLL VENOUS BLD VENIPUNCTURE: CPT

## 2021-10-13 PROCEDURE — 6370000000 HC RX 637 (ALT 250 FOR IP): Performed by: NURSE PRACTITIONER

## 2021-10-13 PROCEDURE — 6370000000 HC RX 637 (ALT 250 FOR IP): Performed by: STUDENT IN AN ORGANIZED HEALTH CARE EDUCATION/TRAINING PROGRAM

## 2021-10-13 PROCEDURE — 6370000000 HC RX 637 (ALT 250 FOR IP): Performed by: INTERNAL MEDICINE

## 2021-10-13 PROCEDURE — 84132 ASSAY OF SERUM POTASSIUM: CPT

## 2021-10-13 PROCEDURE — 80048 BASIC METABOLIC PNL TOTAL CA: CPT

## 2021-10-13 PROCEDURE — 87040 BLOOD CULTURE FOR BACTERIA: CPT

## 2021-10-13 PROCEDURE — 6360000002 HC RX W HCPCS: Performed by: INTERNAL MEDICINE

## 2021-10-13 PROCEDURE — 97165 OT EVAL LOW COMPLEX 30 MIN: CPT

## 2021-10-13 RX ADMIN — ONDANSETRON 4 MG: 2 INJECTION INTRAMUSCULAR; INTRAVENOUS at 08:20

## 2021-10-13 RX ADMIN — FAMOTIDINE 20 MG: 20 TABLET, FILM COATED ORAL at 21:03

## 2021-10-13 RX ADMIN — SODIUM CHLORIDE, PRESERVATIVE FREE 10 ML: 5 INJECTION INTRAVENOUS at 21:03

## 2021-10-13 RX ADMIN — ONDANSETRON 4 MG: 2 INJECTION INTRAMUSCULAR; INTRAVENOUS at 16:56

## 2021-10-13 RX ADMIN — FAMOTIDINE 20 MG: 20 TABLET, FILM COATED ORAL at 08:20

## 2021-10-13 RX ADMIN — ATORVASTATIN CALCIUM 40 MG: 40 TABLET, FILM COATED ORAL at 08:21

## 2021-10-13 RX ADMIN — CEFTRIAXONE SODIUM 2000 MG: 2 INJECTION, POWDER, FOR SOLUTION INTRAMUSCULAR; INTRAVENOUS at 16:56

## 2021-10-13 RX ADMIN — ASPIRIN 81 MG: 81 TABLET, CHEWABLE ORAL at 08:19

## 2021-10-13 ASSESSMENT — PAIN SCALES - GENERAL
PAINLEVEL_OUTOF10: 0

## 2021-10-13 NOTE — CARE COORDINATION
Discharge Planning Update:     Continue to await bed assignment at [facility] for Endocarditis. Pt noted to have 38 beat run of VT today and non-symptomatic. Continues on Rocephin.

## 2021-10-13 NOTE — PROGRESS NOTES
Sarah Watson 58  INPATIENT OCCUPATIONAL THERAPY  [facility] CCU-STEPDOWN 3B  EVALUATION    Time:    Time In: 845  Time Out: 5412  Timed Code Treatment Minutes: 25 Minutes  Minutes: 40          Date: 10/13/2021  Patient Name: Tong Jeffrey,   Gender: male      MRN: [id number]  : 1985  (43 y.o.)  Referring Practitioner: Dr. Dami Disla DO  Diagnosis: Hypokalemia  Additional Pertinent Hx: 53-year-old male with a PMH significant for HTN, coarctation of the aorta s/p repaired at 12 months old, who presents to [facility] with complaints of difficulty with , right hand and foot numbness, feeling as if it was asleep. Around 9:30pm he woke up from sleeping on the couch and he was shivering, vomiting, and agitated per his girlfriend. He then showered and went to sleep around 1030. Around 0130 when he woke up and tried to open a bottle of water and was unable to  with his right hand. The squad was called and patient was brought to the ED. His initial NIHSS was 3 and he was not given TPA given the fact that he was febrile at 102.6 and tachycardic and unsure of LKW. CT head negative for any acute intracranial finding. CT angiogram head and neck negative for any aneurysm, dissection, or stenosis. An LP was done to rule out meningitis and it was negative. Urine drug screen negative. Infectious markers elevated (CRP 8.8, Lactic acid 5, sed rate 38, WBC 14.5). Patient denies any headache, vision changes. Patient denies any recent dental work, smoking history or drug use. His speech difficulty, weakness, numbness have resolved. He currently is on no anticoagulation or antiplatelets. He does report that he has been having intermittent nausea, vomiting, and appetite loss since March. MRI of brain showingspSmall areas of abnormal signal in the diffusion-weighted images in the left corona radiata and left parietal lobe.  There is corresponding high signal on the T2 and FLAIR sequences with only partial low signal on the ADC map. These are most likely small subacute infarcts. There is some associated petechial hemorrhage. 2. Old microhemorrhages in the left frontal lobe, right parietal lobe and possibly left cerebellum. eech    Restrictions/Precautions:  Restrictions/Precautions: General Precautions    Subjective  Chart Reviewed: Yes, Orders, Progress Notes, History and Physical, Imaging    Subjective: Pt sitting up EOB wanting to shower. Rosi Mo RN approving showering tasks    Pain:  Pain Assessment  Patient Currently in Pain: Denies    Vitals: Vitals not assessed per clinical judgement, see nursing flowsheet    Social/Functional History:  Lives With: Significant other  Type of Home: House  Home Layout: One level   Bathroom Shower/Tub: Tub/Shower unit  Bathroom Toilet: Standard  Bathroom Accessibility: Accessible       ADL Assistance: Independent  Homemaking Assistance: Independent  Ambulation Assistance: Independent  Transfer Assistance: Independent    Active : Yes     Additional Comments: Pt stating he was very indep with all ADL asks and completing homemaking tasks as well    VISION:WNL    HEARING:  WNL    COGNITION: WNL    RANGE OF MOTION:  Bilateral Upper Extremity:  WNL    STRENGTH:  Bilateral Upper Extremity:  WNL    SENSATION:   WFL    ADL:   Grooming: Modified Independent. standing at sink  Bathing: Modified Independent. showering tasks  Upper Extremity Dressing: Modified Independent. housecoat  Lower Extremity Dressing: Modified Independent. pants and socks  Toileting: Modified Independent. Toilet Transfer: Modified Independent. .  Pt stating he has been mod indep in room completing all tasks     BALANCE:  Standing Balance: Modified Independent. BED MOBILITY:  Supine to Sit: Modified Independent     Sit to Supine: Modified Independent      TRANSFERS:  Sit to Stand:  Modified Independent. Stand to Sit: Modified Independent.       FUNCTIONAL MOBILITY:  Assistive Device: None  Assist Level:  Modified Independent. Distance: To and from bathroom and shower room across santos   Pt carrying clothign items to shower room with no LOB during        Activity Tolerance:  Patient tolerance of  treatment: good. Assessment:  Assessment: Pt demo ability to complete his ADL tasks including showering this date mod indep with no cues for safety or LOB. Pt requiring no further skilled OT Services at this time d/t being mod indep with ADL tasks  Performance deficits / Impairments: Decreased functional mobility , Decreased ADL status, Decreased safe awareness, Decreased balance  Prognosis: Good  REQUIRES OT FOLLOW UP: No  Decision Making: Low Complexity    Treatment Initiated: Treatment and education initiated within context of evaluation. Evaluation time included review of current medical information, gathering information related to past medical, social and functional history, completion of standardized testing, formal and informal observation of tasks, assessment of data and development of plan of care and goals. Treatment time included skilled education and facilitation of tasks to increase safety and independence with ADL's for improved functional independence and quality of life. Discharge Recommendations:   (Pt being transferred to [facility] when bed available)    Patient Education:  OT Education: OT Role, Plan of Care    Equipment Recommendations:  Equipment Needed: No    Plan:  Times per week: 1 visit  Current Treatment Recommendations: Self-Care / ADL, Balance Training, Safety Education & Training. See long-term goal time frame for expected duration of plan of care. If no long-term goals established, a short length of stay is anticipated.     Goals:  Patient goals : go home  Short term goals  Time Frame for Short term goals: by discharge  Short term goal 1: Pt to complete BADL routine including showering mod indep with no cus for safety-MET  Short term goal 2: Pt to dmeo dynamic standing > 10 min with no UE support mod indep while completing ADL tasks -MET         Following session, patient left in safe position with all fall risk precautions in place.

## 2021-10-13 NOTE — PROGRESS NOTES
Progress note: Infectious diseases    Patient - Tre Lora,  Age - 43 y.o.    - 1985      Room Number - 3B-34/034-A   MRN -  [id number]   Acct # - [de-identified]  Date of Admission -  10/9/2021  1:45 AM    SUBJECTIVE:   No new issues. Awaiting transfer to [facility]. No fever  OBJECTIVE   VITALS    height is 6' 2\" (1.88 m) and weight is 167 lb 3.2 oz (75.8 kg). His oral temperature is 97.8 °F (36.6 °C). His blood pressure is 111/67 and his pulse is 93. His respiration is 18 and oxygen saturation is 98%.        Wt Readings from Last 3 Encounters:   10/11/21 167 lb 3.2 oz (75.8 kg)   21 167 lb 12.8 oz (76.1 kg)   21 168 lb 8 oz (76.4 kg)       I/O (24 Hours)    Intake/Output Summary (Last 24 hours) at 10/13/2021 0820  Last data filed at 10/12/2021 1100  Gross per 24 hour   Intake 200 ml   Output --   Net 200 ml          MEDICATIONS:      cefTRIAXone (ROCEPHIN) IV  2,000 mg IntraVENous Q24H    famotidine  20 mg Oral BID    atorvastatin  40 mg Oral Daily    [Held by provider] amLODIPine  5 mg Oral Daily    [Held by provider] hydrALAZINE  50 mg Oral BID    [Held by provider] losartan  100 mg Oral Daily    [Held by provider] metoprolol succinate  100 mg Oral Daily    sodium chloride flush  5-40 mL IntraVENous 2 times per day    [Held by provider] enoxaparin  40 mg SubCUTAneous Daily    aspirin  81 mg Oral Daily      sodium chloride      sodium chloride       ondansetron, sodium chloride, potassium chloride **OR** potassium alternative oral replacement **OR** potassium chloride, sodium chloride flush, sodium chloride, acetaminophen **OR** acetaminophen, magnesium sulfate      LABS:     CBC:   Recent Labs     10/11/21  0403 10/12/21  0457 10/13/21  0356   WBC 6.0 8.2 7.7   HGB 8.0* 7.7* 8.3*    251 251     BMP:    Recent Labs     10/11/21  0403 10/12/21  0457 10/13/21  0356    137 136   K 3.5 4.5 4.2    103 101   CO2 25 27 27   BUN 6* 5* 6*   CREATININE 0.9 0.9 1.0   GLUCOSE 125* 104 104     Calcium:  Recent Labs     10/13/21  0356   CALCIUM 8.8     Ionized Calcium:No results for input(s): IONCA in the last 72 hours. Magnesium:  Recent Labs     10/11/21  0403   MG 2.1     Phosphorus:No results for input(s): PHOS in the last 72 hours. BNP:No results for input(s): BNP in the last 72 hours. Glucose:  No results for input(s): POCGLU in the last 72 hours. Amylase and Lipase:  No results for input(s): LACTA, AMYLASE in the last 72 hours. Lactic Acid:   No results for input(s): LACTA in the last 72 hours. Troponin: No results for input(s): CKTOTAL, CKMB, TROPONINI in the last 72 hours. BNP: No results for input(s): BNP in the last 72 hours. CULTURES:   UA: No results for input(s): SPECGRAV, PHUR, COLORU, CLARITYU, MUCUS, PROTEINU, BLOODU, RBCUA, WBCUA, BACTERIA, NITRU, GLUCOSEU, BILIRUBINUR, UROBILINOGEN, KETUA, LABCAST, LABCASTTY, AMORPHOS in the last 72 hours. Invalid input(s): CRYSTALS  Micro:   Lab Results   Component Value Date    BC No growth-preliminary  10/09/2021    [facility]  10/09/2021     sensitivity done-previous specimen see blood culture collected 10/09/2021 at 02:20           Problem list of patient:     Patient Active Problem List   Diagnosis Code    Migraine headache with aura G43. 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 repeat blood cx today.   Continue iv ceftriaxone      Nikos Espinoza MD, 10/13/2021 8:20 AM

## 2021-10-13 NOTE — PROGRESS NOTES
Per patient request this RN called [facility] access center for an update - spoke with Mechelle Mota regarding update and to ask if he was able to provide more of an update that just \"there are no beds\" - Mercedez Greenberg stated \"I can not predict when this patient will get a bed\"

## 2021-10-13 NOTE — PROGRESS NOTES
Neurology Progress Note    Date:10/13/2021       [id number]  Patient Name:Meliton Romero     YOB: 1985     Age:36 y.o. Chief Complaint: Right hand numbness, right foot numbness    Subjective     Nel Cortes is a pleasant 23-year-old male with a PMH significant for HTN, coarctation of the aorta s/p repaired at 12 months old, who presents to [facility] with complaints of difficulty with speech, right hand and foot numbness, feeling as if it was asleep. Around 9:30pm he woke up from sleeping on the couch and he was shivering, vomiting, and agitated per his girlfriend. He then showered and went to sleep around 1030. Around 0130 when he woke up and tried to open a bottle of water and was unable to  with his right hand. The squad was called and patient was brought to the ED. His initial NIHSS was 3 and he was not given TPA given the fact that he was febrile at 102.6 and tachycardic and unsure of LKW. CT head negative for any acute intracranial finding. CT angiogram head and neck negative for any aneurysm, dissection, or stenosis. An LP was done to rule out meningitis and it was negative. Urine drug screen negative. Infectious markers elevated (CRP 8.8, Lactic acid 5, sed rate 38, WBC 14.5). Patient denies any headache, vision changes. Patient denies any recent dental work, smoking history or drug use. His speech difficulty, weakness, numbness have resolved. He currently is on no anticoagulation or antiplatelets. He does report that he has been having intermittent nausea, vomiting, and appetite loss since March. Interval history 10/10/2021:  Patient reports he is feeling better this morning. Did end up with fluid in lungs, requiring lasix. Currently on 1L NC and mentions they will probably take the oxygen off soon. He denies any more speech difficulties, or numbness on the right side. He also denies any headache or vision changes.  Plan is to hopefully get him sent to [facility] today. Interval history 10/11/2021:  Patient overall reports a good night. He denies any speech difficulties, numbness, paraesthesias, headache, vision changes. Interval history 10/13/2021:  Patient states he is doing well today. He denies any neurologic complaints. No headache, dizziness, numbness, weakness, or vision changes. Review of Systems   Review of Systems   Eyes: Negative for visual disturbance. Neurological: Negative for dizziness, speech difficulty, weakness, light-headedness, numbness and headaches. Medications   Scheduled Meds:    cefTRIAXone (ROCEPHIN) IV  2,000 mg IntraVENous Q24H    famotidine  20 mg Oral BID    atorvastatin  40 mg Oral Daily    [Held by provider] amLODIPine  5 mg Oral Daily    [Held by provider] hydrALAZINE  50 mg Oral BID    [Held by provider] losartan  100 mg Oral Daily    [Held by provider] metoprolol succinate  100 mg Oral Daily    sodium chloride flush  5-40 mL IntraVENous 2 times per day    [Held by provider] enoxaparin  40 mg SubCUTAneous Daily    aspirin  81 mg Oral Daily     Continuous Infusions:    sodium chloride      sodium chloride       No current facility-administered medications on file prior to encounter.      Current Outpatient Medications on File Prior to Encounter   Medication Sig Dispense Refill    losartan (COZAAR) 100 MG tablet Take 1 tablet by mouth daily 90 tablet 3    hydrALAZINE (APRESOLINE) 50 MG tablet Take 1 tablet by mouth 2 times daily 180 tablet 3    amLODIPine (NORVASC) 5 MG tablet Take 1 tablet by mouth daily 90 tablet 3    metoprolol succinate (TOPROL XL) 100 MG extended release tablet Take 1 tablet by mouth daily 60 tablet 3       PRN Meds: ondansetron, sodium chloride, potassium chloride **OR** potassium alternative oral replacement **OR** potassium chloride, sodium chloride flush, sodium chloride, acetaminophen **OR** acetaminophen, magnesium sulfate    Past History    Past Medical History:   has a past medical history of Hypertension. Social History:   reports that he has never smoked. He has never used smokeless tobacco. He reports current alcohol use of about 3.0 standard drinks of alcohol per week. He reports that he does not use drugs. Family History:   Family History   Problem Relation Age of Onset    High Blood Pressure Mother     Heart Disease Mother     Other Father         Not health related       Physical Examination      Vitals:  /66   Pulse 90   Temp 98.2 °F (36.8 °C) (Oral)   Resp 17   Ht 6' 2\" (1.88 m)   Wt 167 lb 3.2 oz (75.8 kg)   SpO2 96%   BMI 21.47 kg/m²   Temp (24hrs), Av.2 °F (36.8 °C), Min:97.3 °F (36.3 °C), Max:98.8 °F (37.1 °C)      I/O (24Hr): Intake/Output Summary (Last 24 hours) at 10/13/2021 0727  Last data filed at 10/12/2021 1100  Gross per 24 hour   Intake 500 ml   Output --   Net 500 ml         Physical Exam  Vitals reviewed. Constitutional:       General: He is not in acute distress. Appearance: Normal appearance. He is not ill-appearing. HENT:      Head: Normocephalic and atraumatic. Right Ear: External ear normal.      Left Ear: External ear normal.      Nose: Nose normal.      Mouth/Throat:      Mouth: Mucous membranes are moist.      Pharynx: No oropharyngeal exudate or posterior oropharyngeal erythema. Eyes:      Extraocular Movements: EOM normal.      Pupils: Pupils are equal, round, and reactive to light. Cardiovascular:      Rate and Rhythm: Normal rate and regular rhythm. Heart sounds: Murmur (systolic) heard. Pulmonary:      Effort: Pulmonary effort is normal. No respiratory distress. Breath sounds: Normal breath sounds. No wheezing. Abdominal:      General: Bowel sounds are normal.      Palpations: Abdomen is soft. Tenderness: There is no abdominal tenderness. Musculoskeletal:         General: Normal range of motion. Right lower leg: No edema. Left lower leg: No edema.    Skin:     General: Skin is warm. Findings: No rash. Neurological:      Mental Status: He is alert and oriented to person, place, and time. Coordination: Finger-Nose-Finger Test and Heel to [place] alexander Test normal.      Deep Tendon Reflexes:      Reflex Scores:       Tricep reflexes are 2+ on the right side and 2+ on the left side. Bicep reflexes are 2+ on the right side and 2+ on the left side. Brachioradialis reflexes are 2+ on the right side and 2+ on the left side. Patellar reflexes are 2+ on the right side and 2+ on the left side. Achilles reflexes are 2+ on the right side and 2+ on the left side. Psychiatric:         Mood and Affect: Mood normal.         Speech: Speech normal.         Behavior: Behavior normal.       Neurologic Exam     Mental Status   Oriented to person, place, and time. Attention: normal. Concentration: normal.   Speech: speech is normal   Level of consciousness: alert  Normal comprehension. Cranial Nerves     CN II   Visual fields full to confrontation. Right visual field deficit: none  Left visual field deficit: none     CN III, IV, VI   Pupils are equal, round, and reactive to light. Extraocular motions are normal.   Right pupil: Shape: regular. Reactivity: brisk. Left pupil: Shape: regular. Reactivity: brisk. CN V   Facial sensation intact. Right facial sensation deficit: none  Left facial sensation deficit: none    CN VII   Facial expression full, symmetric.    Right facial weakness: none  Left facial weakness: none    CN VIII   CN VIII normal.   Hearing: intact    CN IX, X   CN IX normal.   CN X normal.   Palate: symmetric    CN XI   CN XI normal.   Right trapezius strength: normal  Left trapezius strength: normal    CN XII   CN XII normal.   Tongue: not atrophic  Fasciculations: absent  Tongue deviation: none    Motor Exam   Muscle bulk: normal  Overall muscle tone: normal  Right arm tone: normal  Left arm tone: normal  Right arm pronator drift: absent  Left arm pronator drift: absent  Right leg tone: normal  Left leg tone: normal  Muscle strength 5/5 in bilateral upper and lower extremities     Sensory Exam   Light touch normal.     Gait, Coordination, and Reflexes     Coordination   Finger to nose coordination: normal  Heel to shin coordination: normal    Reflexes   Right brachioradialis: 2+  Left brachioradialis: 2+  Right biceps: 2+  Left biceps: 2+  Right triceps: 2+  Left triceps: 2+  Right patellar: 2+  Left patellar: 2+  Right achilles: 2+  Left achilles: 2+       Labs/Imaging/Diagnostics   Labs:  CBC:  Recent Labs     10/11/21  0403 10/12/21  0457 10/13/21  0356   WBC 6.0 8.2 7.7   RBC 3.10* 3.06* 3.17*   HGB 8.0* 7.7* 8.3*   HCT 26.5* 26.2* 26.9*   MCV 85.5 85.6 84.9    251 251     CHEMISTRIES:  Recent Labs     10/11/21  0403 10/12/21  0457 10/13/21  0356    137 136   K 3.5 4.5 4.2    103 101   CO2 25 27 27   BUN 6* 5* 6*   CREATININE 0.9 0.9 1.0   GLUCOSE 125* 104 104   MG 2.1  --   --      PT/INR:  No results for input(s): PROTIME, INR in the last 72 hours. APTT:  No results for input(s): APTT in the last 72 hours. LIVER PROFILE:  No results for input(s): AST, ALT, BILIDIR, BILITOT, ALKPHOS in the last 72 hours. Imaging Last 24 Hours:  CT ABDOMEN PELVIS WO CONTRAST Additional Contrast? None    Result Date: 10/9/2021  Exam: CT Abdomen and Pelvis Without IV Contrast Comparison: None Findings: Bilateral basilar interstitial pulmonary edema and posterior costophrenic sulcus atelectasis  The liver density is homogeneous  No biliary abnormalities  The spleen is normal in size  No pancreatic ductal dilatation  No hydronephrosis. Delayed contrast imaging shows no urinary tract obstruction. Normal bowel caliber  The appendix is not visualized  No free fluid/free air  No vascular abnormalities. No adenopathy  Bladder outline is smooth.  There is degenerative narrowing of the L5-S1 disc space Impression  Unremarkable CT of the abdomen and pelvis. This document has been electronically signed by: Keri Jones MD on 10/09/2021 05:18 AM All CTs at this facility use dose modulation techniques and iterative reconstructions, and/or weight-based dosing when appropriate to reduce radiation to a low as reasonably achievable. CTA HEAD W WO CONTRAST (CODE STROKE)    Result Date: 10/9/2021  CTA HEAD, bolus contrast injection. 3D reconstructions and MPRs[de-identified] Comparison: None Right Carotid Siphon: Normal Right Anterior Cerebral Artery: A1 and A2 segments are unremarkable. There is peripheral cortical enhancement. Right Middle Cerebral Artery: M1 and M2 segments are unremarkable. There is peripheral cortical enhancement. Right Posterior Cerebral Artery: P1 and P2 segments are unremarkable. There is peripheral enhancement Left Carotid Siphon: Normal Left Anterior Cerebral Artery: A1 and A2 segments are unremarkable. There is peripheral cortical enhancement. Left Middle Cerebral Artery: M1 and M2 segments are unremarkable. There is peripheral cortical enhancement. Left Posterior Cerebral Artery: P1 and P2 segments are unremarkable. There is peripheral cortical enhancement. Basilar Artery: Normal Venous drainage:Normal     Impression: No signs of aneurysm. No signs of arterial venous malformation. No high grade stenosis or vessel cut off. This document has been electronically signed by: Keri Jones MD on 10/09/2021 02:56 AM All CTs at this facility use dose modulation techniques and iterative reconstructions, and/or weight-based dosing when appropriate to reduce radiation to a low as reasonably achievable. CT HEAD WO CONTRAST    Result Date: 10/9/2021  * ADDENDUM #1 **his report was discussed with Duran Saavedra RN on Oct 09, 2021 02:20:00 EDT. This document has been electronically signed by: Kelsey Roberts on 10/09/2021 02:20 AM **ORIGINAL REPORT **T Head Without Contrast: Comparison: None. Findings: Cortical Sulci symmetric.  Basal ganglia are unremarkable Mass effect: No shift in midline structures Intracranial bleeding: No intraparenchymal bleeding or abnormal extra axial blood fluid collections Pituitary: Normal in size Visualized sinuses: Clear Orbital structures: Unremarkable Calvarium: No depressed fractures. Impression:  Unremarkable CT of the head. ASPECTS score 10, NORMAL This document has been electronically signed by: Leilani Truong MD on 10/09/2021 02:17 AM All CTs at this facility use dose modulation techniques and iterative reconstructions, and/or weight-based dosing when appropriate to reduce radiation to a low as reasonably achievable. CTA NECK W WO CONTRAST (CODE STROKE)    Result Date: 10/9/2021  CTA Neck , Bolus contrast injection, 3D reconstructions and MPRs: Comparison: Findings: Soft tissues of the neck are unremarkable. Superior aorta and branch vessels are unremarkable. Right carotid: No stenosis (NASCET) Right vertebral: No stenosis Left Carotid: No stenosis (NASCET) Left Vertebral: No stenosis     Impression: No aneurysm or dissection. No stenosis This document has been electronically signed by: Leilani Truong MD on 10/09/2021 03:05 AM All CTs at this facility use dose modulation techniques and iterative reconstructions, and/or weight-based dosing when appropriate to reduce radiation to a low as reasonably achievable. Carotid stenosis and measurements are in accordance with NASCET criteria. CT CHEST WO CONTRAST    Result Date: 10/9/2021  CT Chest without IV contrast: Comparison: 08/21/2018 Findings: Heart size is mildly enlarged with no pericardial effusion Aorta diameter is normal. Pulmonary artery diameter is unremarkable. Lymph nodes: No adenopathy. Trachea and Esophagus: Unremarkable. No pneumomediastinum Lungs: There is mild posterior bilateral lower lobe atelectasis and gravity dependent edema with no focal consolidation Pleura: [No pleural effusion. Skeletal: No fractures.  Below the diaphragm: Regional visceral organs are unremarkable. Impression: Mild posterior lower lobe atelectasis and posterior gravity dependent edema. No focal consolidation or pleural effusion. Heart size is mildly enlarged. There is mild pulmonary vascular congestion and interstitial edema and peripheral septal lines best visualized posteriorly in the sagittal projection This document has been electronically signed by: Rodriguez Nagy MD on 10/09/2021 05:12 AM All CTs at this facility use dose modulation techniques and iterative reconstructions, and/or weight-based dosing when appropriate to reduce radiation to a low as reasonably achievable. XR CHEST PORTABLE    Result Date: 10/9/2021  1 view chest x-ray. Comparison: 11/04/2016 plain films, CT of the chest 08/05/2021. Findings: No consolidation or effusion. Heart size normal. No acute fracture. Impression: Chronic bilateral central bronchial large airway inflammatory thickening, otherwise normal This document has been electronically signed by: Rodriguez Nagy MD on 10/09/2021 03:16 AM    MRI BRAIN      Impression       1. Small areas of abnormal signal in the diffusion-weighted images in the left corona radiata and left parietal lobe. There is corresponding high signal on the T2 and FLAIR sequences with only partial low signal on the ADC map. These are most likely    small subacute infarcts. There is some associated petechial hemorrhage. 2. Old microhemorrhages in the left frontal lobe, right parietal lobe and possibly left cerebellum. **This report has been created using voice recognition software. It may contain minor errors which are inherent in voice recognition technology. **           Final report electronically signed by Dr. Mary Gonzalez on 10/9/2021 3:29 PM           Assessment and Plan:          1. Acute ischemic stroke   MRI brain shows multiple small acute and subacute strokes appearing cardioembolic in nature in the left corona radiata and left parietal lobe. Read is above   NIHSS q shift   HgbA1C 4.8   LDL 47(LDL Goal 45-70)   2D Echo with bubble study, ordered. Show vegetated mitral valve   Venous doppler negative for DVT   Infectious endocarditis treatment per primary team   Continue 81mg ASA daily   PT/OT/SLP Evaluation    Maintain telemetry, monitor for atrial-fib   Maintain oxygenation saturation >94%   SCDs and lovenox for DVT prophylaxis   CT head is needed if severe headache or altered mental status   Provide stroke education for individualized risk factors     Transfer patient to [facility] per primary team   HERMINIA pending   The patient is stable from a neurologic point of view. Neurology will sign off. Thank you for this consultation. Please call with any questions or concerns. This patient was discussed with Dr. Maria Eugenia Mon and he is in agreement with the assessment and plan.     Electronically signed by Denise Murray PA-C on 10/13/21 at 12:36 PM EDT

## 2021-10-13 NOTE — PROGRESS NOTES
38 beats of v-tach noted on monitor. Patient asymptomatic and self converted to NSR following. Mark Dill NP and Dr. Katherine Albert updated, new orders received.

## 2021-10-13 NOTE — PROGRESS NOTES
PROGRESS NOTE      Patient:  Elvi Ace      Unit/Bed:3B-34/034-A    YOB: 1985    MRN: [id number]       Acct: [de-identified]     PCP: BARBARA uKmar CNP    Date of Admission: 10/9/2021      Assessment/Plan:    Anticipated Discharge in : pending clinical course     Active Hospital Problems    Diagnosis Date Noted    Endocarditis [I38]      Priority: High    Hyponatremia [E87.1]     Septicemia ([facility] [address] [A41.9] 86/76/5770    Cardioembolic stroke ([facility] [address] [B09.3] 10/09/2021    H/O coarctation of aorta, repair at 12 months old [Z87.74] 11/20/2012       Acute ischemic stroke   - MRI on 10/9 demonstrated small areas of abnormal signal in the diffusion-weighted images in the left corona radiata and left parietal lobe. There is corresponding high signal on the T2 and FLAIR sequences with only partial low signal on the ADC map. These are most likely small subacute infarcts. There is some associated petechial hemorrhage.   - Old microhemorrhages in the left frontal lobe, right parietal lobe and possibly left cerebellum.  Dr. 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  - likely 2/2 endocarditis   - Echo on 10/9/21 demonstrated vegetation in irregular shape with variable diameter but larger than 1.3 cm, some decreased ejection mitral valve leaflets as well as degenerative change with resultant severe mitral regurgitation  - Blood cultures positive growth for streptococcus oralis and mitis  - Continue IV Rocephin   - Patient waiting transfer to [facility], opting to wait to have HERMINIA performed at [address] consulted, appreciate the recommendations    Sepsis  - Likely 2/2 to endocarditis    - Currently 0/4 SIRS, afebrile, no leukocytosis, non tachycardic, non tachypneic   - Continue IV Rocephin     Pulmonary edema   - CXR on 10/9 demonstrated pulmonary congestion, improved on CXR on 10/10 s/p lasix 40mg  - No current signs of respiratory distress, good saturation on room air  - Continue to monitor for signs of fluid overload    Essential HTN  - Blood pressure running normal to borderline low   - Continue to hold home BP meds      Elevated troponin  - Likely 2/2 to demand, were stable and did not trend up  - No current chest pain or evidence of ACS  - Continue to monitor for symptoms      Normocytic Anemia: Hgb 7.8 on admission  - Hgb 7.8 on admission, currently stable at 8.3  - Iron studies demonstrated iron deficiency  - Continue to monitor with daily CBC    Chief Complaint: Right sided weakness and altered mental status     Hospital Course:   Camron Longo is 43years old male who presented to the hospital due to right-sided weakness and altered mental status.  Past medical history significant with coarctation of aorta with surgical correction at 14 months ago, hypertension.  Patient reports having a fever every few weeks started in March.  Patient traveled to [place] when he crawl under the spite of a new house and taking a product on the blair.  Denies any bug bite or skin rash.  Patient lost about 30 pounds since March, patient reports loss of appetite in which she has been eating small meals.  Associated symptom include arthralgia. Denies any nausea vomiting.  Patient has been follow-up with cardiologist Dr. Aarti Romo in the past to follow-up with his tachycardia and his history of coarctation of the aorta.  His last TTE in 2015 which show MR with LVEF 55-65%.      In the emergency room patient blood pressure 93/52.  Code stroke was called, CTA head and neck with contrast, lumbar puncture and neurology will consult.  Patient received 3.3 L normal saline for fluid resuscitation and ceftriaxone.  Patient was admitted and managed for sepsis, acute encephalopathy.  MRI showed small subacute infract with some associated petechial hemorrhage. On 10/9, Neurologist was consulted for acute stroke. Nephrologist was consulted for electrolyte abnormalities.      On 10/10, patient was transferred to ICU for hypotension require pressor support (Norepinephrine). Patient was accepted to [facility] by Mone Elias surgeon Dr. Kushal Ramires and Cardiologist Dr. Edil Zapien when bed is available. On 10/10, patient is wean off pressor support. On 10/11, patien was transferred out of ICU. Patient vital sign has been stable with /62, , RR 22, O2 Sat 97%     10/11: Transferred out of the ICU to . Pending transfer to the [facility] for CT surgery. 10/13: Patient stable with no current complaints. Deferred HERMINIA until transfer to [facility], still awaiting bed for transfer. Subjective  Patient seen this afternoon resting in the bedside chair. He denies any current complaints, states he feels well and is just waiting for transfer to [facility]. Denies any current new neurologic symptoms, weakness, chest pain, shortness of breath, numbness, vision changes, headaches.      Medications:  Reviewed    Infusion Medications    sodium chloride      sodium chloride       Scheduled Medications    cefTRIAXone (ROCEPHIN) IV  2,000 mg IntraVENous Q24H    famotidine  20 mg Oral BID    atorvastatin  40 mg Oral Daily    [Held by provider] amLODIPine  5 mg Oral Daily    [Held by provider] hydrALAZINE  50 mg Oral BID    [Held by provider] losartan  100 mg Oral Daily    [Held by provider] metoprolol succinate  100 mg Oral Daily    sodium chloride flush  5-40 mL IntraVENous 2 times per day    [Held by provider] enoxaparin  40 mg SubCUTAneous Daily    aspirin  81 mg Oral Daily     PRN Meds: ondansetron, sodium chloride, potassium chloride **OR** potassium input(s): Patrisha Meigs in the last 72 hours. Urinalysis:      Lab Results   Component Value Date    NITRU NEGATIVE 10/09/2021    WBCUA 0-2 10/09/2021    BACTERIA NONE SEEN 10/09/2021    RBCUA 5-10 10/09/2021    BLOODU SMALL 10/09/2021    SPECGRAV 1.009 11/24/2016    GLUCOSEU NEGATIVE 10/09/2021       Radiology:  VL DUP LOWER EXTREMITY VENOUS BILATERAL   Final Result      No evidence of deep venous thrombosis in either lower extremity. **This report has been created using voice recognition software. It may contain minor errors which are inherent in voice recognition technology. **             Final report electronically signed by Dr. Nicole Chester on 10/10/2021 10:39 AM      XR CHEST PORTABLE   Final Result   Pulmonary vascular congestion which has somewhat improved since the previous study. **This report has been created using voice recognition software. It may contain minor errors which are inherent in voice recognition technology. **      Final report electronically signed by Dr Onesimo Inman on 10/10/2021 2:05 AM      XR CHEST PORTABLE   Final Result   Interval development of moderate pulmonary vascular congestion. **This report has been created using voice recognition software. It may contain minor errors which are inherent in voice recognition technology. **      Final report electronically signed by Dr Onesimo Inman on 10/9/2021 6:53 PM      MRI BRAIN W WO CONTRAST   Final Result       1. Small areas of abnormal signal in the diffusion-weighted images in the left corona radiata and left parietal lobe. There is corresponding high signal on the T2 and FLAIR sequences with only partial low signal on the ADC map. These are most likely    small subacute infarcts. There is some associated petechial hemorrhage. 2. Old microhemorrhages in the left frontal lobe, right parietal lobe and possibly left cerebellum.                **This report has been created using voice recognition software. It may contain minor errors which are inherent in voice recognition technology. **         Final report electronically signed by Dr. Roz Murrieta on 10/9/2021 3:29 PM      CT CHEST WO CONTRAST   Final Result   Impression:      Mild posterior lower lobe atelectasis and posterior gravity dependent    edema. No focal consolidation or pleural effusion. Heart size is mildly enlarged. There is mild pulmonary vascular congestion    and interstitial edema and peripheral septal lines best visualized    posteriorly in the sagittal projection      This document has been electronically signed by: Zaid Krause MD on    10/09/2021 05:12 AM      All CTs at this facility use dose modulation techniques and iterative    reconstructions, and/or weight-based dosing   when appropriate to reduce radiation to a low as reasonably achievable. CT ABDOMEN PELVIS WO CONTRAST Additional Contrast? None   Final Result      XR CHEST PORTABLE   Final Result   Impression:   Chronic bilateral central bronchial large airway inflammatory thickening,    otherwise normal      This document has been electronically signed by: Zaid Krause MD on    10/09/2021 03:16 AM      CTA HEAD W WO CONTRAST (CODE STROKE)   Final Result   Impression:      No signs of aneurysm. No signs of arterial venous malformation. No high grade stenosis or vessel cut off. This document has been electronically signed by: Zaid Krause MD on    10/09/2021 02:56 AM      All CTs at this facility use dose modulation techniques and iterative    reconstructions, and/or weight-based dosing   when appropriate to reduce radiation to a low as reasonably achievable. CTA NECK W WO CONTRAST (CODE STROKE)   Final Result   Impression:      No aneurysm or dissection.     No stenosis      This document has been electronically signed by: Zaid Krause MD on    10/09/2021 03:05 AM      All CTs at this facility use dose modulation techniques and iterative reconstructions, and/or weight-based dosing   when appropriate to reduce radiation to a low as reasonably achievable. Carotid stenosis and measurements are in accordance with NASCET criteria. CT HEAD WO CONTRAST   Final Result   Impression:       Unremarkable CT of the head. ASPECTS score 10, NORMAL      This document has been electronically signed by: Leilani Truong MD on    10/09/2021 02:17 AM      All CTs at this facility use dose modulation techniques and iterative    reconstructions, and/or weight-based dosing   when appropriate to reduce radiation to a low as reasonably achievable. Diet: ADULT DIET; Regular;  Low Sodium (2 gm)    DVT prophylaxis: [] Lovenox                                 [x] SCDs                                 [] SQ Heparin                                 [x] Encourage ambulation           [x] Already on Anticoagulation     Disposition:    [] Home       [] TCU       [] Rehab       [] Psych       [] SNF       [] [place]       [x] Other- Awaiting transfer to [facility]     Code Status: Full Code    PT/OT Eval Status: N/A      Electronically signed by Alida Rose DO on 10/13/2021 at 4:45 PM

## 2021-10-13 NOTE — PROGRESS NOTES
Spoke with Ignacio Souza at [address] stated that pt did not need ICU bed to be transferred to [address] [place] [address] for Endocarditis - Chelsey Pepe requested that a provider call her at [phone] to change the order to get patient transferred sooner. Dr. Broussard Crew informed of this.

## 2021-10-14 VITALS
SYSTOLIC BLOOD PRESSURE: 115 MMHG | HEART RATE: 96 BPM | BODY MASS INDEX: 21.46 KG/M2 | RESPIRATION RATE: 16 BRPM | OXYGEN SATURATION: 97 % | DIASTOLIC BLOOD PRESSURE: 78 MMHG | HEIGHT: 74 IN | TEMPERATURE: 98.2 F | WEIGHT: 167.2 LBS

## 2021-10-14 LAB
ANAEROBIC CULTURE: NORMAL
CSF CULTURE: NORMAL
GRAM STAIN RESULT: NORMAL
HISTOPLASMA ANTIGEN URINE: NOT DETECTED
HISTOPLASMA ANTIGEN URINE: NOT DETECTED NG/ML

## 2021-10-14 ASSESSMENT — PAIN SCALES - GENERAL: PAINLEVEL_OUTOF10: 0

## 2021-10-14 NOTE — PROGRESS NOTES
Pt transported to [facility] via [address] [facility] MICU. Discharge paperwork given to [facility] with [facility].   Report called to Yaniv RODRIGUEZ at the [facility]

## 2021-10-14 NOTE — PROGRESS NOTES
Access center called - Bed available at [address] [place] [address] 2.  [phone]. [facility] unable to transport patient via ACLS. This RN called [facility] to switch transport from ACLS service to MICU service. Per [facility] transport will be available at approx 2-3am for transfer.      Resource Theresa RODRIGUEZ updated

## 2021-10-14 NOTE — FLOWSHEET NOTE
10/13/21 2100   Vitals   Temp 98.9 °F (37.2 °C)   Temp Source Oral   Pulse 102   Heart Rate Source Monitor   Resp 15   /68   MAP (mmHg) 87   BP Location Left upper arm   BP Upper/Lower Upper     Pt stable and voicing no concerns at this time.   Pt remains in NSR on the telemetry monitor

## 2021-10-18 LAB — BLOOD CULTURE, ROUTINE: NORMAL

## 2021-10-19 LAB — BLOOD CULTURE, ROUTINE: NORMAL

## 2021-11-02 ENCOUNTER — TELEPHONE (OUTPATIENT)
Dept: CARDIOLOGY CLINIC | Age: 36
End: 2021-11-02

## 2021-11-02 DIAGNOSIS — Q25.1 COARCTATION OF AORTA: Primary | ICD-10-CM

## 2021-11-02 NOTE — PLAN OF CARE
Hospital Facility-Based Program  [facility] Int[facility]iac [facility]/Traditional Cardiac Rehab  PHYSICIAN ORDER  Class I Level B based on research  Medical Director:  Dr. Olga López MD     Patient Name: Alejandro Irby : 1985  Referring Physician: Dr. Larry Cox [facility])  Date: 2021  Allergies: Allergies as of 2021    (No Known Allergies)        Diagnosis:  MVR on 10/20/2021    [x] [facility] Intensive Cardiac Rehab with telemetry monitoring, resting and exercise        BPs & HRs with each session. Hospital setting for patient safety. [x] 72 sessions: 36 exercise sessions, 36 education sessions   [] 36 sessions: 18 exercise sessions, 18 education sessions  [] Traditional Cardiac Rehab with telemetry monitoring, resting and exercise BPs &       HRs with each session. Hospital setting for patient safety. [] 36 sessions:  32 exercise sessions, 4 education sessions     Per Patient symptoms, proceed with:   [x]Nitroglycerine 0.4mg SL every 5 minutes prn, maximum of 3, for chest pain   [x]12-lead EKG for symptoms of chest pain or noted change in heart rhythm   [x]Administer O2 per nasal cannula for symptoms of chest pain or acute dyspnea    Physician Prescribed Exercise:  Plan of Care:  Patient to attend exercise sessions with aerobic endurance and strength training for a total of 31-60 min/day, 3 days/week with supplemented 30+ minutes of aerobic exercise at home on days not participating in Cardiac Rehab. Aerobic Endurance Training  Aerobic Endurance mode (TM, AD, NS) starting at 5-8 minutes progressing by 2-3 minutes each week to a total of 15-30 minutes 2-3x/week. Arms only 5 min  Stair step increasing to 2 min  Resistance/strength training:  Hand weights starting at 1-5 lbs increasing in weight by 1-2 lbs and/or per patient tolerance weekly.   Start with 8 repetitions and increase the repetitions each exercise session per patient tolerance for a total of 15

## 2021-11-02 NOTE — TELEPHONE ENCOUNTER
Pt had sx at [place] with  and they are asking for pt to have a echo done in the next 7-10 days here. Ok to order?

## 2021-11-09 ENCOUNTER — HOSPITAL ENCOUNTER (OUTPATIENT)
Dept: NON INVASIVE DIAGNOSTICS | Age: 36
Discharge: HOME | End: 2021-11-09
Payer: COMMERCIAL

## 2021-11-09 DIAGNOSIS — Q25.1 COARCTATION OF AORTA: ICD-10-CM

## 2021-11-09 LAB
LV EF: 48 %
LVEF MODALITY: NORMAL

## 2021-11-09 PROCEDURE — 93306 TTE W/DOPPLER COMPLETE: CPT

## 2021-11-18 ENCOUNTER — OFFICE VISIT (OUTPATIENT)
Dept: CARDIOLOGY CLINIC | Age: 36
End: 2021-11-18
Payer: COMMERCIAL

## 2021-11-18 VITALS
BODY MASS INDEX: 21.36 KG/M2 | SYSTOLIC BLOOD PRESSURE: 116 MMHG | WEIGHT: 161.2 LBS | HEART RATE: 68 BPM | DIASTOLIC BLOOD PRESSURE: 80 MMHG | HEIGHT: 73 IN

## 2021-11-18 DIAGNOSIS — R06.02 SOB (SHORTNESS OF BREATH): ICD-10-CM

## 2021-11-18 DIAGNOSIS — Z86.79 HISTORY OF ENDOCARDITIS: ICD-10-CM

## 2021-11-18 DIAGNOSIS — I63.9 ISCHEMIC STROKE (HCC): Primary | ICD-10-CM

## 2021-11-18 PROCEDURE — 99213 OFFICE O/P EST LOW 20 MIN: CPT | Performed by: INTERNAL MEDICINE

## 2021-11-18 RX ORDER — FUROSEMIDE 20 MG/1
20 TABLET ORAL PRN
COMMUNITY
End: 2022-02-22

## 2021-11-18 RX ORDER — LOSARTAN POTASSIUM 50 MG/1
25 TABLET ORAL DAILY
COMMUNITY
End: 2021-11-18

## 2021-11-18 RX ORDER — CEFTRIAXONE 1 G/1
2 INJECTION, POWDER, FOR SOLUTION INTRAMUSCULAR; INTRAVENOUS EVERY 24 HOURS
COMMUNITY
Start: 2021-10-26 | End: 2021-12-01

## 2021-11-18 RX ORDER — M-VIT,TX,IRON,MINS/CALC/FOLIC 27MG-0.4MG
1 TABLET ORAL DAILY
COMMUNITY

## 2021-11-18 RX ORDER — POTASSIUM CHLORIDE 750 MG/1
10 TABLET, FILM COATED, EXTENDED RELEASE ORAL PRN
COMMUNITY
End: 2022-02-22

## 2021-11-18 RX ORDER — ASPIRIN 81 MG/1
81 TABLET ORAL DAILY
COMMUNITY
End: 2021-11-18 | Stop reason: SDUPTHER

## 2021-11-18 RX ORDER — LOSARTAN POTASSIUM 25 MG/1
25 TABLET ORAL DAILY
COMMUNITY
End: 2021-11-18 | Stop reason: SDUPTHER

## 2021-11-18 RX ORDER — PANTOPRAZOLE SODIUM 20 MG/1
20 TABLET, DELAYED RELEASE ORAL DAILY
COMMUNITY
End: 2021-11-18 | Stop reason: SDUPTHER

## 2021-11-18 NOTE — PROGRESS NOTES
[address] [address] [place] [address]  Dept: [phone]  Dept Fax: [phone]  Loc: [phone]    Visit Date: 11/18/2021    Mr. Ken Murphy is a 43 y.o. male  who presented for:  Chief Complaint   Patient presents with    Follow-up       HPI:   Patient is a 44 y/o male with PMH coarctation of the aorta s/p repair at 16 mo old and Streptococcus mitis endocarditis and mitral valve regurgitation s/p aortic valve debridement and MVR surgery 10/20/21, and CVA here for follow-up for his surgery. The patient is feeling well today. He denies any fever, chills, chest pain, SOB, palpitations, edema, headache/dizziness, lightheadedness, syncope, or any other symptoms. The patient is compliant with his medications, and states that he takes metoprolol succinate 100 mg in the morning and 150 mg in the evening as instructed. He is also compliant with his IV ceftriaxone, which he will be taking until 12/1/21. Patient's ECHO on 11/9/21 showed EF 45-50% with mild global hypokinesis. There was no mitral regurg/stenosis s/p SMVR. He had a bicuspid valve but no other valve abnormalities noted. Elevated descending thoracic aortic velocities of 2.5 m/s suggestive of residual coarctation. This coarctation was not repaired during his surgery in October.       Current Outpatient Medications:     Multiple Vitamins-Minerals (THERAPEUTIC MULTIVITAMIN-MINERALS) tablet, Take 1 tablet by mouth daily, Disp: , Rfl:     pantoprazole (PROTONIX) 20 MG tablet, Take 20 mg by mouth daily, Disp: , Rfl:     cefTRIAXone (ROCEPHIN) 1 g injection, Infuse 2 g intravenously every 24 hours Over 5 min push, Disp: , Rfl:     furosemide (LASIX) 20 MG tablet, Take 20 mg by mouth as needed, Disp: , Rfl:     potassium chloride (KLOR-CON) 10 MEQ extended release tablet, Take 10 mEq by mouth as needed Takes with Lasix, Disp: , Rfl:     aspirin 81 MG EC tablet, Take 81 mg by mouth daily, Disp: , Rfl:     losartan (COZAAR) 25 MG tablet, Take 25 mg by mouth daily, Disp: , Rfl:     metoprolol succinate (TOPROL XL) 100 MG extended release tablet, Take 1 tablet by mouth daily (Patient taking differently: Take 100 mg by mouth See Admin Instructions 1 tablet AM 1.5 tablets PM), Disp: 60 tablet, Rfl: 3    Past Medical History  Kaushik Hinds  has a past medical history of Hypertension. Social History  Kaushik Hinds  reports that he has never smoked. He has never used smokeless tobacco. He reports current alcohol use of about 3.0 standard drinks of alcohol per week. He reports that he does not use drugs. Family History  Meliton family history includes Heart Disease in his mother; High Blood Pressure in his mother; Other in his father. There is no family history of bicuspid aortic valve, aneurysms, heart transplant, pacemakers, defibrillators, or sudden cardiac death. Past Surgical History   Past Surgical History:   Procedure Laterality Date    CARDIAC CATHETERIZATION      CARDIAC SURGERY      COARCTATION OF AORTA REPAIR  06/1984       Review of Systems   Constitutional: Negative for chills and fever  HENT: Negative for congestion, sinus pressure, sneezing and sore throat. Eyes: Negative for pain, discharge, redness and itching. Respiratory: Negative for apnea, cough  Gastrointestinal: Negative for blood in stool, constipation, diarrhea   Endocrine: Negative for cold intolerance, heat intolerance, polydipsia. Genitourinary: Negative for dysuria, enuresis, flank pain and hematuria. Musculoskeletal: Negative for arthralgias, joint swelling and neck pain. Neurological: Negative for numbness and headaches. Psychiatric/Behavioral: Negative for agitation, confusion, decreased concentration and dysphoric mood.      Objective:     /80   Pulse 68   Ht 6' 0.5\" (1.842 m)   Wt 161 lb 3.2 oz (73.1 kg)   BMI 21.56 kg/m²     Wt Readings from Last 3 Encounters:   11/18/21 161 lb 3.2 oz (73.1 kg)   10/11/21 167 lb 3.2 oz (75.8 kg)   08/11/21 167 lb 12.8 oz (76.1 kg)     BP Readings from Last 3 Encounters:   11/18/21 116/80   10/14/21 115/78   08/11/21 (!) 142/82       Nursing note and vitals reviewed. Physical Exam   Constitutional: Oriented to person, place, and time. Appears well-developed and well-nourished. HENT:   Head: Normocephalic and atraumatic. Eyes: EOM are normal. Pupils are equal, round, and reactive to light. Neck: Normal range of motion. Neck supple. No JVD present. Cardiovascular: Normal rate, regular rhythm, normal heart sounds and intact distal pulses. No murmur heard at mitral valve region. No murmur heard. Pulmonary/Chest: Effort normal and breath sounds normal. No respiratory distress. No wheezes. No rales. Abdominal: Soft. Bowel sounds are normal. No distension. There is no tenderness. Musculoskeletal: Normal range of motion. No edema. Neurological: Alert and oriented to person, place, and time. No cranial nerve deficit. Coordination normal.   Skin: Skin is warm and dry. Psychiatric: Normal mood and affect.        Lab Results   Component Value Date    CKTOTAL 24 10/09/2021       Lab Results   Component Value Date    WBC 8.0 11/15/2021    RBC 4.33 11/15/2021    RBC 4.71 02/23/2012    HGB 11.9 11/15/2021    HCT 37.6 11/15/2021    MCV 86.8 11/15/2021    MCH 27.5 11/15/2021    MCHC 31.6 11/15/2021    RDW 12.9 11/24/2016     11/15/2021    MPV 9.7 11/15/2021       Lab Results   Component Value Date     10/13/2021    K 4.2 10/13/2021    K 4.2 10/13/2021     10/13/2021    CO2 27 10/13/2021    BUN 6 10/13/2021    LABALBU 3.0 10/09/2021    CREATININE 0.7 11/15/2021    CALCIUM 8.8 10/13/2021    LABGLOM >90 11/15/2021    GLUCOSE 104 10/13/2021    GLUCOSE 95 11/25/2019       Lab Results   Component Value Date    ALKPHOS 72 10/09/2021    ALT 14 10/09/2021    AST 15 10/09/2021    PROT 6.2 10/09/2021    BILITOT 1.0 10/09/2021    BILIDIR 0.2 08/21/2017 LABALBU 3.0 10/09/2021       Lab Results   Component Value Date    MG 1.9 10/13/2021       Lab Results   Component Value Date    INR 1.42 (H) 10/09/2021         Lab Results   Component Value Date    LABA1C 4.8 10/09/2021       Lab Results   Component Value Date    TRIG 54 10/09/2021    HDL 18 10/09/2021    LDLCALC 47 10/09/2021    LABVLDL 28 08/21/2017       Lab Results   Component Value Date    TSH 1.180 08/21/2017         Testing Reviewed:      I have individually reviewed the cardiac test below:    ECHO: Results for orders placed during the hospital encounter of 11/09/21    ECHO Complete 2D W Doppler W Color    Narrative  Transthoracic Echocardiography Report (TTE)    Demographics    Patient Name  April Mendoza      Gender             Male  Elmer Barbosa    MR #          [id number]      Race                   Ethnicity    Account #     [de-identified]      Room Number    Accession     [id number]     Date of Study      11/09/2021  Number    Date of Birth 1985     Referring          Cecile New MD    Age           43 year(s)     Sonographer        MADELEINE Long, RDCS,  RDMS, RVT    Interpreting       Bruna De Anda MD  Physician    Procedure    Type of Study    TTE procedure:ECHOCARDIOGRAM COMPLETE 2D W DOPPLER W COLOR. Procedure Date  Date: 11/09/2021 Start: 10:00 AM    Study Location: Echo Lab  Technical Quality: Adequate visualization    Indications:Mitral valve replacement and Coarctation of the aorta. Additional Medical History:endocarditis, systolic murmur, history of  coarctation repair, cardioembolic, MVR Bicor size #83    Patient Status: Routine    Height: 72 inches Weight: 154 pounds BSA: 1.91 m^2 BMI: 20.89 kg/m^2    BP: 116/60 mmHg    Conclusions    Summary  Moderately dilated left ventricular size and mildly reduced systolic  function. There was mild global hypokinesis. Wall thickness was within normal limits.   Ejection fraction was estimated at 45-50%. Left atrial size was mildly dilated. S/p SMVR. DOPPLER: The transmitral velocity was within the normal range  with no evidence for mitral stenosis (mean gradient 6 mmHg). There was no  evidence of mitral regurgitation. The aortic valve was bicuspid with normal thickness and cuspal separation. There was trace aortic regurgitation. There was mild tricuspid regurgitation. Elevated descending thoracic aortic velocities of 2.5 m/s suggestive of  residual coarctation of the aorta. Signature    ----------------------------------------------------------------  Electronically signed by Hina Razo MD (Interpreting  physician) on 11/11/2021 at 05:37 AM  ----------------------------------------------------------------    Findings    Mitral Valve  S/p SMVR. DOPPLER: The transmitral velocity was within the normal range  with no evidence for mitral stenosis (mean gradient 6 mmHg). There was no  evidence of mitral regurgitation. Aortic Valve  The aortic valve was bicuspid with normal thickness and cuspal separation. DOPPLER: Transaortic velocity was within the normal range with no evidence  of aortic stenosis. There was trace aortic regurgitation. Tricuspid Valve  The tricuspid valve structure was normal with normal leaflet separation. DOPPLER: There was no evidence of tricuspid stenosis. There was mild  tricuspid regurgitation. Pulmonic Valve  The pulmonic valve leaflets exhibited normal thickness, no calcification,  and normal cuspal separation. DOPPLER: The transpulmonic velocity was  within the normal range with trace regurgitation. Left Atrium  Left atrial size was mildly dilated. Left Ventricle  Moderately dilated left ventricular size and mildly reduced systolic  function. There was mild global hypokinesis. Wall thickness was within normal limits. Ejection fraction was estimated at 45-50%.     Right Atrium  Right atrial size was normal.    Right Ventricle  The right ventricular size cm/s    [email or web address] 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 Is compliant with medications. Hx of coarctation of the aorta s/p repair at 12 months old, appears to have recurred per Echo results and causes 2/2 HTN but no hemodynamic issues. Hx left ischemic stroke  HTN  Pulmonary nodule? Continue current medical management. No changes necessary at this time. Will refer to ID, Neurology, and Pulmonary for his perioperative issues. Disposition:  6 months     Electronically signed by Jose Chilel DO   11/18/2021 at 1:54 PM EST    Attending Supervising Physician's Attestation Statement  I performed a history and physical examination on the patient and discussed the management with the resident physician. I reviewed and agree with the findings and plan as documented in the resident's note.     Electronically signed by Lucy Terrazas MD on 11/28/21 at 9:32 AM EST

## 2021-11-22 ENCOUNTER — TELEPHONE (OUTPATIENT)
Dept: CARDIOLOGY CLINIC | Age: 36
End: 2021-11-22

## 2021-11-23 NOTE — TELEPHONE ENCOUNTER
Spoke with Dr Jean Marie vitale and gave them information. Rock Bueno sent records but will double check the fax number and resend.

## 2021-11-28 RX ORDER — LOSARTAN POTASSIUM 25 MG/1
25 TABLET ORAL DAILY
Qty: 90 TABLET | Refills: 1 | OUTPATIENT
Start: 2021-11-28 | End: 2022-01-31 | Stop reason: SDUPTHER

## 2021-11-28 RX ORDER — PANTOPRAZOLE SODIUM 20 MG/1
20 TABLET, DELAYED RELEASE ORAL DAILY
Qty: 90 TABLET | Refills: 1 | OUTPATIENT
Start: 2021-11-28 | End: 2022-02-22

## 2021-11-28 RX ORDER — ASPIRIN 81 MG/1
81 TABLET ORAL DAILY
Qty: 90 TABLET | Refills: 1 | OUTPATIENT
Start: 2021-11-28

## 2021-11-30 ENCOUNTER — TELEPHONE (OUTPATIENT)
Dept: CARDIOLOGY CLINIC | Age: 36
End: 2021-11-30

## 2021-11-30 NOTE — TELEPHONE ENCOUNTER
Faxed received from Dr. Carlos Banks office stating had tried to reach patient 3x went to voice mail since 11/29/21. Spoke to patient , patient states has already called Dr. Lan Rivas office once and will call them back.

## 2021-12-01 ENCOUNTER — TELEPHONE (OUTPATIENT)
Dept: CARDIOLOGY CLINIC | Age: 36
End: 2021-12-01

## 2021-12-01 ENCOUNTER — HOSPITAL ENCOUNTER (OUTPATIENT)
Dept: WOUND CARE | Age: 36
Discharge: HOME | End: 2021-12-01
Payer: COMMERCIAL

## 2021-12-01 VITALS
DIASTOLIC BLOOD PRESSURE: 74 MMHG | SYSTOLIC BLOOD PRESSURE: 130 MMHG | OXYGEN SATURATION: 99 % | HEART RATE: 60 BPM | TEMPERATURE: 97.6 F | RESPIRATION RATE: 16 BRPM

## 2021-12-01 DIAGNOSIS — I63.9 CARDIOEMBOLIC STROKE (HCC): Primary | ICD-10-CM

## 2021-12-01 DIAGNOSIS — I63.9 ISCHEMIC STROKE (HCC): ICD-10-CM

## 2021-12-01 PROCEDURE — 99211 OFF/OP EST MAY X REQ PHY/QHP: CPT

## 2021-12-01 NOTE — PROGRESS NOTES
[facility]          Progress Note and Procedure Note      Lorena Del Valle  MEDICAL RECORD NUMBER:  [id number]  AGE: 43 y.o. GENDER: male  : 1985  EPISODE DATE:  2021    Subjective:     SUBJECTIVE     Chief Complaint   Patient presents with    Other     IV antibiotics           HISTORY OF PRESENT ILLNESS      Lorena Del Valle is a 43 y.o. male who presents today for wound/ulcer evaluation. He had history of endocarditis with involvement of the mitral valve. He had history of coarctation of aorta patient was referred to [facility] where he had his mitral valve replacement and the vegetations were cleaned he has been on IV ceftriaxone. Tomorrow is his last day with antibiotics. He contacted me yesterday to schedule for follow-up as he was not able to be followed at the [facility] due to issues with his insurance. His blood work was reviewed he has no fever overall he is feeling much better. His medical record was reviewed. PAST MEDICAL HISTORY         Diagnosis Date    Hypertension          PAST SURGICAL HISTORY     Past Surgical History:   Procedure Laterality Date    CARDIAC CATHETERIZATION      CARDIAC SURGERY      COARCTATION OF AORTA REPAIR  1984     FAMILY HISTORY         Family History   Problem Relation Age of Onset    High Blood Pressure Mother     Heart Disease Mother     Other Father         Not health related     SOCIAL HISTORY       Social History     Tobacco Use    Smoking status: Never Smoker    Smokeless tobacco: Never Used   Vaping Use    Vaping Use: Never used   Substance Use Topics    Alcohol use:  Yes     Alcohol/week: 3.0 standard drinks     Types: 3 Cans of beer per week     Comment: rare 3 times a year    Drug use: No     ALLERGIES         No Known Allergies  MEDICATIONS         Current Outpatient Medications on File Prior to Encounter   Medication Sig Dispense Refill    losartan (COZAAR) 25 MG tablet Take 1 tablet by mouth daily 90 tablet 1    pantoprazole (PROTONIX) 20 MG tablet Take 1 tablet by mouth daily 90 tablet 1    aspirin 81 MG EC tablet Take 1 tablet by mouth daily 90 tablet 1    Multiple Vitamins-Minerals (THERAPEUTIC MULTIVITAMIN-MINERALS) tablet Take 1 tablet by mouth daily      cefTRIAXone (ROCEPHIN) 1 g injection Infuse 2 g intravenously every 24 hours Over 5 min push      furosemide (LASIX) 20 MG tablet Take 20 mg by mouth as needed      potassium chloride (KLOR-CON) 10 MEQ extended release tablet Take 10 mEq by mouth as needed Takes with Lasix      metoprolol succinate (TOPROL XL) 100 MG extended release tablet Take 1 tablet by mouth daily (Patient taking differently: Take 100 mg by mouth See Admin Instructions 1 tablet AM  1.5 tablets PM) 60 tablet 3     No current facility-administered medications on file prior to encounter. REVIEW OF SYSTEMS:     Constitutional: no fever, no night sweats, no fatigue. Head: no head ache , no head injury, no migranes. Eye: no blurring of vision, no double vision. Ears: no hearing difficulty, no tinnitus  Mouth/throat: no ulceration, dental caries , dysphagia  Lungs: no cough, no shortness of breath, no wheeze  CVS: no palpitation, no chest pain, no shortness of breath  GI: no abdominal pain, no nausea , no vomiting, no constipation  SLIM: no dysuria, frequency and urgency, no hematuria, no kidney stones  Musculoskeletal: no joint pain, swelling , stiffness,  Endocrine: no polyuria, polydipsia, no cold or heat intolerance  Hematology: no anemia, no easy brusing or bleeding, no hx of clotting disorder  Dermatology: no skin rash, no eczema, no pruritis,  Psychiatry: no depression, no anxiety,no panic attacks, no suicide ideation        PHYSICAL EXAM      infrared temperature is 97.6 °F (36.4 °C). His blood pressure is 130/74 and his pulse is 60. His respiration is 16 and oxygen saturation is 99%.        Wt Readings from Last 3 Encounters:   11/18/21 161 lb 3.2 oz (73.1 kg)   10/11/21 167 lb 3.2 oz (75.8 kg)   08/11/21 167 lb 12.8 oz (76.1 kg)          General Appearance  Awake, alert, oriented,  not  In acute distress  HEENT - normocephalic, atraumatic, pink conjunctiva,  anicteric sclera  Neck - Supple, no mass  Lungs -  Bilateral  air entry, no rhonchi, no wheeze  Cardiovascular - Heart sounds are normal.    Abdomen - soft, not distended, nontender, no organomegally,  Neurologic -oriented to person place and  Skin - No bruising or bleeding  Extremities - No edema, no cyanosis, clubbing            LABS      Lab Results   Component Value Date    BC No growth-preliminary No growth  10/13/2021       Assessment:   Subacute bacterial endocarditis with involvement of the mitral valve required mild trial valve bioprosthetic replacement  He will complete his IV antibiotic tomorrow. We remove his PICC line  Follow-up will be scheduled as needed at the clinic. Advised on dental care prophylaxis during dental work    Patient Active Problem List   Diagnosis Code    Migraine headache with aura G43. 399    Systolic murmur J45.2    H/O coarctation of aorta, repair at 12 months old Z80.71    Septicemia ([facility] [address] R38.0    Cardioembolic stroke ([facility] [address] M75.9    Endocarditis I38    Hyponatremia E87.1       Plan:     Patient examined and evaluated  IV ceftriaxone last day 12/2/2020  Please see attached Discharge Instructions    Written patient dismissal instructions given to patient and signed by patient or POA. Discharge Instructions       Visit Discharge/Physician Orders: Complete IV antibiotics tomorrow 12/2/2021  - home health to pull the PICC line on 12/2/21 or 12/3/21     Home Care: [facility]      Follow up visit:  As needed    Keep next scheduled appointment. Please give 24 hour notice if unable to keep appointment.  [phone]    If you experience any of the following, please call the Wound Care Service during business hours: Monday through Friday 8:00 am - 4:30 pm

## 2021-12-01 NOTE — PLAN OF CARE
Problem: Physical Regulation:  Goal: Will remain free from infection  Description: Will remain free from infection  Outcome: Ongoing  Goal: Complications related to the disease process, condition or treatment will be avoided or minimized  Description: Complications related to the disease process, condition or treatment will be avoided or minimized  Outcome: Ongoing   Pt. Seen today for IV antibiotics see AVS for new orders. Follow up as needed. Care plan reviewed with patient and wife. Patient and wife verbalize understanding of the plan of care and contribute to goal setting.

## 2021-12-01 NOTE — TELEPHONE ENCOUNTER
Per neuro interventional team patient needs to be seen by neurology and not neuro intervention. OK to refer patient to neurology?

## 2021-12-02 ENCOUNTER — HOSPITAL ENCOUNTER (OUTPATIENT)
Dept: CARDIAC REHAB | Age: 36
Setting detail: THERAPIES SERIES
Discharge: HOME | End: 2021-12-02
Payer: COMMERCIAL

## 2021-12-02 VITALS — OXYGEN SATURATION: 100 % | WEIGHT: 165 LBS | HEIGHT: 72 IN | HEART RATE: 58 BPM | BODY MASS INDEX: 22.35 KG/M2

## 2021-12-02 PROCEDURE — 93798 PHYS/QHP OP CAR RHAB W/ECG: CPT

## 2021-12-02 NOTE — PROGRESS NOTES
[facility]  Individualized Cardiac Treatment Plan    Patient Name:  Daniel Monsivais  :  1985  Age:  43 y.o. MRN: [id number]  Diagnosis: MVR  Date of Event: 10/20/21  Physician:  Etta  Next Office Visit:    Date Entered Program: 21  Risk Stratifications: [] Low [x] Intermediate [] High  Allergies: No Known Allergies    COVID -19 Screen  Do you have any of the following symptoms:  [] Fever [] Cough [] SOB [] Muscle/Body Ache [] Loss of taste/smell [x] None    Have you traveled outside of the [place]? [] Yes      [x] No    Have you been around anyone who has tested Positive for COVID 19?  [] Yes      [x] No    The following Education has been completed with patient. [x] Wait in the lobby until we call you back to rehab. [x] You must wear a mask while in the medical center, and in cardiac rehab. Please bring your own. [x] Bring your own bottle of water with you.       Individual Cardiac Treatment Plan -EXERCISE  INITIAL 30 DAY 60 DAY 90  DAY FINAL DAY   EXERCISE  ASSESSMENT/PLAN EXERCISE  REASSESSMENT EXERCISE   REASSESSMENT EXERCISE   REASSESSMENT EXERCISE   REASSESSMENT EXERCISE  DISCHARGE/FOLLOW-UP   Date: 21 Date: Date: Date: Date: Date:   Session #1  First Exercise Session:  ** Session # Session # Session # Session # Session #  Last Exercise Session: **   Stages of Change Stages of Change Stages of Change Stages of Change Stages of Change Stages of Change   [x] Pre Contemplation  [] Contemplation  [] Preparation  [] Action  [] Maintenance  [] Relapse [] Pre Contemplation  [] Contemplation  [] Preparation  [] Action  [] Maintenance  [] Relapse [] Pre Contemplation  [] Contemplation  [] Preparation  [] Action  [] Maintenance  [] Relapse [] Pre Contemplation  [] Contemplation  [] Preparation  [] Action  [] Maintenance  [] Relapse [] Pre Contemplation  [] Contemplation  [] Preparation  [] Action  [] Maintenance  [] Relapse [] Pre Contemplation  [] Contemplation  [] Preparation  [] Action  [] Maintenance  [] Relapse   EXERCISE ASSESSMENT EXERCISE ASSESSMENT EXERCISE ASSESSMENT EXERCISE ASSESSMENT EXERCISE ASSESSMENT EXERCISE ASSESSMENT   6 Min Walk Test  Distance walked:   0.28 miles  1478 ft  3.1 METs  Max HR:89 BPM      RPE:  12    THR:  120-144  Rhythm:  NSR     6 Min Walk Test  Distance walked:   ** miles  ** ft  ** METs  Max HR:** BPM      RPE:  **    %Changeft = **  Rhythm:  **   DASI: 6.4 METs DASI: ** METs DASI: ** METs DASI: ** METs DASI: ** METs DASI: ** METs   Return to Work  [employer] on returning to work? [x] Yes              [] No   [] Disabled     [] Retired    If yes:  *Job description: drive semi, forklift, walking, desk work   *Required MET level=5-7  *Return to Work Date: 1/1/22 Return to work: Has Amy Grimes returned to work? [] Yes    [] No    Return to work date set? [] Yes, **    [] No    Amy Hark is doing ** at work. Return to work: Has Amy Jordank returned to work? [] Yes    [] No    Return to work date set? [] Yes, **    [] No    Amy Hark is doing ** at work. Return to work: Has Amy Jordank returned to work? [] Yes    [] No    Return to work date set? [] Yes, **    [] No    Amy Hark is doing ** at work. Return to work: Has Amy Jordank returned to work? [] Yes    [] No    Return to work date set? [] Yes, **    [] No    Amy Hark is doing ** at work. Return to work: Has Amy Hark returned to work? [] Yes    [] No    Return to work date set? [] Yes, **    [] No    *Required MET Level achieved for job duties?    [] Yes    [] No   Orthopedic Limitations/  [x] Yes    [] No  If yes please list:  Knee pain     Orthopedic Limitations  *If patient has orthopedic issue:   Actions/  accomodations needed to make Meliton successful : Orthopedic Limitations   Orthopedic Limitations   Orthopedic Limitations Orthopedic Limitations     Fall Risk    [x]Assessed as a fall risk  [x] Not a fall risk      [] Balance Issues [] Milan Morales        [] Rodolfo Dee       [] Wheelchair  Actions needed:  none    [x] Safety issues reviewed       Fall Risk  *If patient is a fall risk, action needed to accommodate:  Fall Risk Fall Risk Fall Risk Fall Risk   Home Exercise  [] Yes    [x] No   Home Exercise  [] Yes    [] No  Type: **  Frequency:**  Duration: ** Home Exercise  [] Yes    [] No  Type: **  Frequency: **  Duration: ** Home Exercise  [] Yes    [] No  Type: **  Frequency: **  Duration: ** Home Exercise  [] Yes    [] No  Type: **  Frequency: **  Duration: ** Home Exercise  [] Yes    [] No  Type: **  Frequency: **  Duration: **   Angina with Activity? [] Yes    [x] No  Angina Management: na Angina with Activity? [] Yes    [] No  Angina Management: ** Angina with Activity? [] Yes    [] No  Angina Management: ** Angina with Activity? [] Yes    [] No  Angina Management: ** Angina with Activity? [] Yes    [] No  Angina Management: ** Angina with Activity?   [] Yes    [] No  Angina Management: **   EXERCISE PLAN EXERCISE PLAN EXERCISE PLAN EXERCISE PLAN EXERCISE PLAN EXERCISE PLAN   *Interventions* *Interventions* *Interventions* *Interventions* *Interventions* *Interventions*   Exercise Prescription  (per physician & CR staff) Exercise Prescription  (per physician & CR staff) Exercise Prescription  (per physician & CR staff) Exercise Prescription  (per physician & CR staff) Exercise Prescription  (per physician & CR staff) Exercise Prescription  (per physician & CR staff)   Cardiovascular Cardiovascular Cardiovascular Cardiovascular Cardiovascular Cardiovascular   Mode:    [x] Treadmill (TM)  [x] Schwinn Airdyne (AD)  [x] Arms Ergometer (AE)  [] NuStep  [] Elliptical (E) MODE:    [] Treadmill (TM)  [] Schwinn Airdyne (AD)  [] Arms Ergometer (AE)  [] NuStep  [] Elliptical (E) MODE:    [] Treadmill (TM)  [] Schwinn Airdyne (AD)  [] Arms Ergometer (AE)  [] NuStep  [] Elliptical (E) MODE:    [] Treadmill (TM)  [] Schwinn Airdyne (AD)  [] Arms Ergometer (AE)  [] NuStep  [] Elliptical (E) MODE:    [] Treadmill (TM)  [] Schwinn Airdyne (AD)  [] Arms Ergometer (AE)  [] NuStep  [] Elliptical (E) MODE:    [] Treadmill (TM)  [] Schwinn Airdyne (AD)  [] Arms Ergometer (AE)  [] NuStep  [] Elliptical (E)   Initial Workloads  TM: [email or web address] 3.6 METs  AD: 1.0 level = 3.1 METs  NS: 70  Robbins= 3.1 METs  AE: 0.5 level = 2 METs Current Workloads  TM: @ %=  METs  AD:  level =  METs  NS:  Robbins=  METs  AE:  level =  METs Current Workloads  TM: @ %=  METs  AD:  level =  METs  NS:  Robbins=  METs  AE:  level =  METs Current Workloads  TM: @ %=  METs  AD:  level =  METs  NS:  Robbins=  METs  AE:  level =  METs Current Workloads  TM: @ %=  METs  AD:  level =  METs  NS:  Robbins=  METs  AE:  level =  METs Current Workloads  TM: @ %=  METs  AD:  level =  METs  NS:  Robbins=  METs  AE:  level =  METs   Frequency:    ICR: 3x/week  Home: 2-3x/wk Frequency:   ICR: 3x/week  Home: 3x/wk Frequency:  ICR: 3x/week  Home: 3-4x/wk Frequency:  ICR: 3x/week  Home: 3-4x/wk Frequency:  ICR: 3x/week  Home: 3-4x/wk Frequency:  Meliton will continue exercise at  5-7 days/week   Duration:   Total aerobic exercise = 25-30 min    10 min/mode Duration:  Total aerobic exercises = ** min     **min/mode Duration:  Total aerobic exercises = ** min     **min/mode Duration:  Total aerobic exercises = ** min     **min/mode Duration:  Total aerobic exercises = ** min     **min/mode Duration:  Total erobic exercise =  60-90 min   Intensity:   MET Level = 3.1-3.6  RPE = 12-15 Intensity:  Max MET Level = **  RPE = 12-15 Intensity:  Max MET Level = **  RPE = 12-15 Intensity:  Max MET Level = **  RPE = 12-15 Intensity:  Max MET Level = **  RPE = 12-15 Intensity:  Max MET Level = ** RPE = 12-15   Progression: increase aerobic activity up to 15 min over next 4 weeks by increasing time 2-3 min/week.  Progression:   Progression:   Progression: Progression: Progression:  Increase time/intensity when RPE <13, and HR is in THRR     Resistance Training Resistance Training Resistance Training Resistance Training Resistance Training Resistance Training   [x] Yes      [] No  Upper and Lower body strength training 2x/wk    Wt: 4#       Reps:  8-15    *Increase wt. after completing 15 reps with an RPE of <12/13. [] Yes      [] No  Upper and Lower body strength training 2x/wk    Wt: **#       Reps:  8-15    *Increase wt. after completing 15 reps with an RPE of <12/13. [] Yes      [] No  Upper and Lower body strength training 2x/wk    Wt: **#       Reps:  8-15    *Increase wt. after completing 15 reps with an RPE of <12/13. [] Yes      [] No  Upper and Lower body strength training 2x/wk    Wt: **#       Reps:  8-15    *Increase wt. after completing 15 reps with an RPE of <12/13. [] Yes      [] No  Upper and Lower body strength training 2x/wk    Wt: **#       Reps:  8-15    *Increase wt. after completing 15 reps with an RPE of <12/13. Continue Strength Training at home   [] Exercise Log & Strength training handout given     Wt: **#       Reps:  8-15    *Increase wt. after completing 15 reps with an RPE of <12/13. Flexibility Flexibility Flexibility Flexibility Flexibility Flexibility   [x] Yes      [] No  25 min session of Core Strength & Flexibility 1x/per week  Attends Core Strength & Flexibility   [] Yes      [] No Attends Core Strength & Flexibility   [] Yes      [] No Attends Core Strength & Flexibility   [] Yes      [] No Attends Core Strength & Flexibility   [] Yes      [] No Continue Core Strength & Flexibility at home   Home Exercise  Brenton verbalizes planning to walk  3-4 days/week for 10-20 min on days not in rehab. Home Exercise  *Meliton verbalizes planning to ** ** days/week for ** min on days not in rehab. Home Exercise  *Meliton verbalizes planning to ** ** days/week for ** min on days not in rehab. Home Exercise  *Meliton verbalizes planning to ** ** days/week for ** min on days not in rehab.  Home Exercise  *Meliton verbalizes planning to ** ** days/week for ** min on days not in rehab. Home Exercise  *Meliton verbalizes his/her plan to ** ** days/week for ** min @ **   *Education* *Education* *Education* *Education* *Education* *Education*   RPE Scale  [x] Yes      [] No  Exercise Safety  [x] Yes      [] No  Equipment Orientation  [x] Yes      [] No  S/S to Report  [x] Yes      [] No  Warm Up/Cool Down  [x] Yes      [] No  Home Exercise  [x] Yes      [] No     All Exercise Education Completed  [] Yes      [] No   *Goals* *Goals* *Goals* *Goals* *Goals* *Goals*   Meliton plans to:  [x] Attend exercise sessions 3x/wk  [x] initiate home exercise 2-3x/wk for 10-20 min  [x] Increase 6 min walk distance by 10%  [] ** Meliton plans to:  [] Attend exercise sessions 3x/wk  [] continue home exercise 2-3x/wk for 20-30 min  [] ** Meliton's plans to:  [x] Attend exercise sessions 3x/wk  [x] continue home exercise 2-3x/wk for 30-45 min  [x] Determine plan of exercise following rehab  [] ** Meliton plans to:  [] Attend exercise sessions 3x/wk  [] continue home exercise 2-3x/wk for 20-30 min  [] ** Meliton plans to:  [] Attend exercise sessions 3x/wk  [] continue home exercise 2-3x/wk for 20-30 min  [] ** Meliton achieved exercise goals?    Yes    [] No  If no, why?  **  [] Increased 6 min walk distance by 10%  [] Currently exercising 30-60 min/day, 5-7days/wk   [] Plans to continue exercise on own  [] Plans to join a local fitness center to continue exercise  [] Does not plan to continue to exercise after rehab   Return to ADL or Hobbies:  Amber Barnett would like to improve strength and endurance so he is able to return to work around the house, get back to work Return to ADL or Hobbies:  Amber Barnett  ** Return to ADL or Hobbies:  Meliton  ** Return to ADL or Hobbies:  Meliton  ** Return to ADL or Hobbies:  Meliton  ** Return to ADL or Hobbies:  Meliton  **    *MET level required for above goal:  5-7 METs MET level Achieved:  **METs MET level Achieved:  **METs MET level Achieved:  **METs MET level Achieved:  **METs MET level Achieved:  **METs     Individual Cardiac Treatment Plan - Nutrition  NUTRITION  ASSESSMENT/PLAN NUTRITION  REASSESSMENT NUTRITION   REASSESSMENT NUTRITION   REASSESSMENT NUTRITION   REASSESSMENT NUTRITION  DISCHARGE/FOLLOW-UP   Stages of Change Stages of Change Stages of Change Stages of Change Stages of Change Stages of Change   [] Pre Contemplation  [x] Contemplation  [] Preparation  [] Action  [] Maintenance  [] Relapse [] Pre Contemplation  [] Contemplation  [] Preparation  [] Action  [] Maintenance  [] Relapse [] Pre Contemplation  [] Contemplation  [] Preparation  [] Action  [] Maintenance  [] Relapse [] Pre Contemplation  [] Contemplation  [] Preparation  [] Action  [] Maintenance  [] Relapse [] Pre Contemplation  [] Contemplation  [] Preparation  [] Action  [] Maintenance  [] Relapse [] Pre Contemplation  [] Contemplation  [] Preparation  [] Action  [] Maintenance  [] Relapse   NUTRITION ASSESSMENT NUTRITION ASSESSMENT NUTRITION ASSESSMENT NUTRITION ASSESSMENT NUTRITION ASSESSMENT NUTRITION ASSESSMENT   Weight Management  Weight: 170.6        Height: 72\"   BMI: 23.2  Weight Management  Weight: **                  Weight Management  Weight: **                  Weight Management  Weight: ** Weight Management  Weight: ** Weight Management  Weight: **                  BMI: **   Eating Plan  Current eating habits: low sodium Eating Plan  Changes: Eating Plan  Changes: Eating Plan  Changes: Eating Plan  Changes: Eating Plan Improvements:   Alcohol Use  [x] none          [] daily  [] weekly      [] special           Diet Assessment Tool:  RATE YOUR PLATE  *Given to patient to complete and return.  Diet Assessment Tool:    Score: **/69       Diet Assessment Tool: RATE YOUR PLATE  Score: **/98   NUTRITION PLAN NUTRITION PLAN NUTRITION PLAN NUTRITION PLAN NUTRITION PLAN NUTRITION PLAN   *Interventions* *Interventions* *Interventions* *Interventions* *Interventions* *Interventions*   Initial Survey given Goal Setting Discussion:   [] Yes      [] No        Follow Up Survey Reviewed & Goals Updated:     Professional Referral  Please check if needed. []Dietician Consult   []Wt. Management Referral  []Other:  Professional Referral  Please check if needed. []Dietician Consult   []Wt. Management Referral  []Other:  Professional Referral  Please check if needed. []Dietician Consult   []Wt. Management Referral  []Other:  Professional Referral  Please check if needed. []Dietician Consult   []Wt. Management Referral  []Other: Professional Referral  Please check if needed. []Dietician Consult   []Wt. Management Referral  []Other: Professional Referral  Please check if needed. []Dietician Consult   []Wt. Management Referral  []Other:    *Education* *Education* *Education* *Education* *Education* *Education*   Nutritional Education Recommended    [x] Overview of The Pritik[employer] Eating Plan video Nutritional Education Attended/Date    See Education Videos under Risk Factors Nutritional Education Attended/Date    See Education Videos under Risk Factors Nutritional Education Attended/Date    See Education Videos under Risk Factors Nutritional Education Attended/Date    See Education Videos under Risk Factors Video Completed? [] Yes  [] No   *Goals* *Goals* *Goals* *Goals* *Goals* *Goals*   Meliton's nutritional goals are as follows:  Complete and return diet survey [] Completed Video  [] Complete and return diet survey [] Completed Video  [] Complete and return diet survey [] Completed Video  [] Complete and return diet survey [] Completed Video  [] Complete and return diet survey Meliton achieved nutritional goals   [] Yes    [] No  If no, why?      Individual Cardiac Treatment Plan - Psychosocial  PSYCHOSOCIAL  ASSESSMENT/PLAN PSYCHOSOCIAL  REASSESSMENT PSYCHOSOCIAL   REASSESSMENT PSYCHOSOCIAL   REASSESSMENT PSYCHOSOCIAL   REASSESSMENT PSYCHOSOCIAL  DISCHARGE/FOLLOW-UP   Stages of Change Stages of Change Stages of Change Stages of Change Stages of Change Stages of Change   [x] Pre Contemplation  [] Contemplation  [] Preparation  [] Action  [] Maintenance  [] Relapse [] Pre Contemplation  [] Contemplation  [] Preparation  [] Action  [] Maintenance  [] Relapse [] Pre Contemplation  [] Contemplation  [] Preparation  [] Action  [] Maintenance  [] Relapse [] Pre Contemplation  [] Contemplation  [] Preparation  [] Action  [] Maintenance  [] Relapse [] Pre Contemplation  [] Contemplation  [] Preparation  [] Action  [] Maintenance  [] Relapse [] Pre Contemplation  [] Contemplation  [] Preparation  [] Action  [] Maintenance  [] Relapse   PSYCHOSOCIAL ASSESSMENT PSYCHOSOCIAL ASSESSMENT PSYCHOSOCIAL ASSESSMENT PSYCHOSOCIAL ASSESSMENT PSYCHOSOCIAL ASSESSMENT PSYCHOSOCIAL ASSESSMENT   Behavioral Outcomes Behavioral Outcomes Behavioral Outcomes Behavioral Outcomes Behavioral Outcomes Behavioral Outcomes   Tool Used:  Louise & Shaina, Quality of Life Index, Cardiac Version IV  *Given to patient to complete. Tool Used:    Louise & Shaina, Quality of Life Index, Cardiac Version IV   QOL Index Score: **    HF:**  S&E:**  P&S: **  Family: **    Tool Used:     Louise & Shaina, Quality of Life Index, Cardiac Version IV  QOL Index Score: **    HF:**  S&E:**  P&S: **  Family: **   PHQ-9 score 3  Depression Severity  [x]Minimal  []Mild   []Moderate  []Moderately Severe  []Severe     PHQ-9 score **  Depression Severity  []Minimal  []Mild   []Moderate  []Moderately Severe   []Severe   Does patient have Family Support? [x] Yes      [] No  No signs of marital/family distress        Within the Past Month:  *Have you wished you were dead or wished you could go to sleep and not wake up? [] Yes      [x] No  *Have you had any thoughts of killing yourself?   [] Yes      [x] No          Using a scale of 0-10, 0=none, 10=very:   Rate your depression: 0  Rate your anxiety:  0  Using a scale of 0-10, 0=none, 10=very:   Rate your depression: **  Rate your anxiety:  ** Using a scale of 0-10, 0=none, 10=very:   Rate your depression: **  Rate your anxiety:  ** Using a scale of 0-10, 0=none, 10=very:   Rate your depression: **  Rate your anxiety:  ** Using a scale of 0-10, 0=none, 10=very:   Rate your depression: **  Rate your anxiety:  ** Using a scale of 0-10, 0=none, 10=very:   Rate your depression: **  Rate your anxiety:  **   Signs and Symptoms of Depression Present? [] Yes      [x] No   Signs and Symptoms of Depression Present? [] Yes      [] No  If yes, please explain:  ** Signs and Symptoms of Depression Present? [] Yes      [] No  If yes, please explain:  ** Signs and Symptoms of Depression Present? [] Yes      [] No  If yes, please explain:  ** Signs and Symptoms of Depression Present? [] Yes      [] No  If yes, please explain:  ** Signs and Symptoms of Depression Present? [] Yes      [] No  If yes, please explain:  **   Signs and Symptoms of Anxiety Present? [] Yes      [x] No   Signs and Symptoms of Anxiety Present? [] Yes      [] No  If yes, please explain:  ** Signs and Symptoms of Anxiety Present? [] Yes      [] No  If yes, please explain:  ** Signs and Symptoms of Anxiety Present? [] Yes      [] No  If yes, please explain:  ** Signs and Symptoms of Anxiety Present? [] Yes      [] No  If yes, please explain:  ** Signs and Symptoms of Anxiety Present? [] Yes      [] No  If yes, please explain:  **   [] Patient has poor eye contact   [] Flat affect present. [] Signs of anxiety, anger or hostility    [] Signs social isolation present.    []  Signs of alcohol or substance abuse        PSYCHOSOCIAL PLAN PSYCHOSOCIAL PLAN PSYCHOSOCIAL PLAN PSYCHOSOCIAL PLAN PSYCHOSOCIAL PLAN PSYCHOSOCIAL PLAN   *Interventions* *Interventions* *Interventions* *Interventions* *Interventions* *Interventions*   *Please check if needed  [] Psych Consult  [] Physician Referral  [x] Stress Management Skills *Please check if needed  [] Psych Consult  [] Physician Referral  [] Stress Management Skills *Please check if needed  [] Psych Consult  [] Physician Referral  [] Stress Management Skills *Please check if needed  [] Psych Consult  [] Physician Referral  [] Stress Management Skills *Please check if needed  [] Psych Consult  [] Physician Referral  [] Stress Management Skills *Please check if needed  [] Psych Consult  [] Physician Referral  [] Stress Management Skills   Is patient currently taking anti-depressant or anti-anxiety medications? [] Yes      [x] No   Change in anti-depressant or anti-anxiety medications? [] Yes      [] No  If yes, please list medications:  ** Change in anti-depressant or anti-anxiety medications? [] Yes      [] No  If yes, please list medications:  ** Change in anti-depressant or anti-anxiety medications? [] Yes      [] No  If yes, please list medications:  ** Change in anti-depressant or anti-anxiety medications? [] Yes      [] No  If yes, please list   medications:  ** Change in anti-depressant or anti-anxiety medications? [] Yes      [] No  If yes, please list medications:  **   *Education* *Education* *Education* *Education* *Education* *Education*   Healthy Mind Education Recommended    [x] Healthy Minds, Bodies,Hearts video See Education Videos under Risk Factor See Education Videos under Risk Factor See Education Videos under Risk Factor See Education Videos under Risk Factor Video Completed? [] Yes  [] No   *Goals* *Goals* *Goals* *Goals* *Goals* *Goals*   Meliton's psychosocial goals are as follows:  Complete HADS & Louise & Shaina, Quality of Life Index, Cardiac Version IV [] Reduce depression symptom severity by 1 level [] Reduce depression symptom severity by 1 level [] Reduce depression symptom severity by 1 level [] Reduce depression symptom severity by 1 level Meliton achieved psychosocial goals?   [] Yes    [] No  If no, why?  **  [] Use methods learned to continue to reduce depression symptom severity by 1 level     Individual Cardiac Treatment Plan - Other:  Risk Factor/Education  RISK FACTOR  ASSESSMENT/PLAN RISK FACTOR  REASSESSMENT  RISK FACTOR  REASSESSMENT RISK FACTOR  REASSESSMENT RISK FACTOR  REASSESSMENT RISK FACTOR   DISCHARGE/FOLLOW-UP   Stages of Change Stages of Change Stages of Change Stages of Change Stages of Change Stages of Change   [] Pre Contemplation  [x] Contemplation  [] Preparation  [] Action  [] Maintenance  [] Relapse [] Pre Contemplation  [] Contemplation  [] Preparation  [] Action  [] Maintenance  [] Relapse [] Pre Contemplation  [] Contemplation  [] Preparation  [] Action  [] Maintenance  [] Relapse [] Pre Contemplation  [] Contemplation  [] Preparation  [] Action  [] Maintenance  [] Relapse [] Pre Contemplation  [] Contemplation  [] Preparation  [] Action  [] Maintenance  [] Relapse [] Pre Contemplation  [] Contemplation  [] Preparation  [] Action  [] Maintenance  [] Relapse   RISK FACTOR/EDUCATION ASSESSMENT RISK FACTOR/EDUCATION ASSESSMENT RISK FACTOR/EDUCATION ASSESSMENT RISK FACTOR/EDUCATION ASSESSMENT RISK FACTOR/EDUCATION ASSESSMENT RISK FACTOR /EDUCATION ASSESSMENT   Hypertension  [x] Yes      [] No    Resting BP: 128/72  Peak Ex BP:142/64  Medication: losartan, metoprolol Hypertension  Resting BP: **  Peak Ex BP:**  Medication Changes:  [] Yes      [] No Hypertension  Resting BP: **  Peak Ex BP:**  Medication Changes:  [] Yes      [] No Hypertension  Resting BP: **  Peak Ex BP:**  Medication Changes:  [] Yes      [] No Hypertension  Resting BP: **  Peak Ex BP:**  Medication Changes:  [] Yes      [] No Hypertension  Resting BP: **  Peak Ex BP:**  Medication Changes:  [] Yes      [] No   Lipids  HLD/DLD  [] Yes      [x] No    Medication: na Lipids  Medication Changes:  [] Yes      [] No     Lipids  Medication Changes:  [] Yes      [] No     Lipids  Medication Changes:  [] Yes      [] No     Lipids  Medication Changes:  [] Yes      [] No Lipids    TOTAL CHOL: **  HDL:  **  LDL: **  TRIG:  **  Medication Changes:  [] Yes      [] No   Diabetes  [] Yes      [x] No   Diabetes  Most Recent BS:  BS have been in range  [] Yes      [] No  Medication Changes  [] Yes      [] No   Diabetes  Most Recent BS:  BS have been in range  [] Yes      [] No  Medication Changes  [] Yes      [] No     Diabetes  Most Recent BS:  BS have been in range  [] Yes      [] No  Medication Changes  [] Yes      [] No     Diabetes  Most Recent BS:  BS have been in range  [] Yes      [] No  Medication Changes  [] Yes      [] No Diabetes  Most Recent BS:  BS have been in range  [] Yes      [] No  Medication Changes  [] Yes      [] No       Tobacco Use  [] Current  [] Former  [x] Never      Smokeless Tobacco use:   [] Yes      [x] No   Tobacco Use  Change in smoking status   [] Yes      [] No    Quit date: **   Tobacco Use  Change in smoking status   [] Yes      [] No    Quit date: **   Tobacco Use  Change in smoking status   [] Yes      [] No    Quit date: ** Tobacco Use  Change in smoking status   [] Yes      [] No    Quit date: ** Tobacco Use  Change in smoking status   [] Yes      [] No    Quit date: **             Learning Barriers  Please select one:  []Speech  []Literacy  [x]Hearing  []Cognitive  [x]Vision  [x]Ready to Learn Learning Barriers Addressed:   [] Yes      [] No   Learning Barriers Addressed:   [] Yes      [] No   Learning Barriers Addressed:  [] Yes      [] No Learning Barriers Addressed:  [] Yes      [] No Learning Barriers Addressed:  [] Yes      [] No     RISK FACTOR/EDUCATION PLAN RISK FACTOR/EDUCATION PLAN RISK FACTOR/EDUCATION PLAN RISK FACTOR/EDUCATION PLAN RISK FACTOR/EDUCATION PLAN RISK FACTOR/EDUCATION PLAN   *Interventions* *Interventions* *Interventions* *Interventions* *Interventions* *Interventions*   Recommended Educational Videos    [x] Heart Disease Risk Reduction  [x] Overview of The Pritikin Eating Plan  [x] Move It!   [x] Healthy Minds, Bodies, Hearts       Completed Videos       Completed Limitations  [] Body Composition  [] Improve Performance  [] Exercise Action Plan  [] Intro to Yoga  Behavioral  [] How Our Thoughts Can Heal Our Hearts  [] Smoking Cessation  Nutrition  [] Becoming A Pritikin   [] Calorie Density  [] Label Reading-Part 1  [] Facts on Fat  [] Biology of Weight Control  [] Nurtition Action Plan  [] Planning Your Eating Strategy  [] Lable Reading- Part 2  [] Dining Out-Part 1  [] Dining Out - Part 2  [] Targeting Your Nutrition Priorities  [] Fueling a Healthy Body  [] Menu Workshop  [employer]   [] Breakfast & Snacks  [] [employer] Salads & Dressing  [] [address] [place] [address]  [] Soups & Desserts Additional Educational Videos Completed           Additional Educational Videos Completed Additional Educational Videos Completed Additional Educational Videos Completed Additional Educational Videos Completed    [] Yes    [] No   *Goals* *Goals* *Goals* *Goals* *Goals* *Goals*   Meliton's risk factor/education goals are as follows:    [x] Optimal BP <140/90  [] Blood Sugar <120  [x] Attend recommended video education sessions  [x] Takes medications as prescribed 100% of the time   [] **   Meliton's risk factor/education goals are as follows:    [x] Optimal BP <140/90  [] Blood Sugar <120  [x] Attend recommended video education sessions  [x] Takes medications as prescribed 100% of the time   [] ** Meliton's risk factor/education goals are as follows:    [x] Optimal BP <140/90  [] Blood Sugar <120  [x] Attend recommended video education sessions  [x] Takes medications as prescribed 100% of the time   [] ** Meliton's risk factor/education goals are as follows:    [x] Optimal BP <140/90  [] Blood Sugar <120  [x] Attend recommended video education sessions  [x] Takes medications as prescribed 100% of the time   [] ** Meliton's risk factor/education goals are as follows:    [x] Optimal BP <140/90  [] Blood Sugar <120  [x] Attend recommended video education sessions  [x] Takes medications as prescribed 100% of the time   [] ** Meliton achieved risk factor goals?   [] Yes    [] No  If no, why?  **       Monitored telemetry has revealed NSR   Monitored telemetry has revealed **  [] documented arrhythmia at increasing workloads  [] associated symptoms ** Monitored telemetry has revealed  [] documented arrhythmia at increasing workloads  [] associated symptoms ** Monitored telemetry has revealed **  [] documented arrhythmia at increasing workloads  [] associated symptoms ** Monitored telemetry has revealed **  [] documented arrhythmia at increasing workloads  [] associated symptoms ** Monitored telemetry has revealed **  [] documented arrhythmia at increasing workloads  [] associated symptoms **   Physician Response    [x] Cardiac rehab is reasonably and medically necessary for continuous cardiac monitoring surveillance  of patient's cardiac activity  [x] Initiate continuous telemetry monitoring and notify me with any concerns  [] Other   Physician Response    [x] Cardiac rehab is reasonably and medically necessary for continuous cardiac monitoring surveillance  of patient's cardiac activity  [x] Continue continuous telemetry monitoring and notify me with any concerns  [] Other     Physician Response      [x] Cardiac rehab is reasonably and medically necessary for continuous cardiac monitoring surveillance  of patient's cardiac activity  [x] Continue continuous telemetry monitoring and notify me with any concerns   [] Other     Physician Response    [x] Cardiac rehab is reasonably and medically necessary for continuous cardiac monitoring surveillance  of patient's cardiac activity  [x] Continue continuous telemetry monitoring and notify me with any concerns   [] Other   Physician Response    [x] Cardiac rehab is reasonably and medically necessary for continuous cardiac monitoring surveillance  of patient's cardiac activity  [x] Continue continuous telemetry monitoring and notify me with any concerns   [] Other

## 2021-12-06 ENCOUNTER — HOSPITAL ENCOUNTER (OUTPATIENT)
Dept: CARDIAC REHAB | Age: 36
Setting detail: THERAPIES SERIES
Discharge: HOME | End: 2021-12-06
Payer: COMMERCIAL

## 2021-12-06 PROCEDURE — 93798 PHYS/QHP OP CAR RHAB W/ECG: CPT

## 2021-12-07 ENCOUNTER — OFFICE VISIT (OUTPATIENT)
Dept: FAMILY MEDICINE CLINIC | Age: 36
End: 2021-12-07
Payer: COMMERCIAL

## 2021-12-07 VITALS
HEART RATE: 76 BPM | RESPIRATION RATE: 16 BRPM | WEIGHT: 172.7 LBS | DIASTOLIC BLOOD PRESSURE: 70 MMHG | BODY MASS INDEX: 23.42 KG/M2 | SYSTOLIC BLOOD PRESSURE: 124 MMHG

## 2021-12-07 DIAGNOSIS — A41.9 SEPTICEMIA (HCC): ICD-10-CM

## 2021-12-07 DIAGNOSIS — Z95.2 MITRAL VALVE REPLACED: ICD-10-CM

## 2021-12-07 DIAGNOSIS — I33.0 SUBACUTE BACTERIAL ENDOCARDITIS: ICD-10-CM

## 2021-12-07 DIAGNOSIS — M76.51 PATELLAR TENDONITIS OF BOTH KNEES: Primary | ICD-10-CM

## 2021-12-07 DIAGNOSIS — M76.52 PATELLAR TENDONITIS OF BOTH KNEES: Primary | ICD-10-CM

## 2021-12-07 DIAGNOSIS — I63.9 CARDIOEMBOLIC STROKE (HCC): ICD-10-CM

## 2021-12-07 PROCEDURE — 99214 OFFICE O/P EST MOD 30 MIN: CPT | Performed by: NURSE PRACTITIONER

## 2021-12-07 RX ORDER — MELOXICAM 15 MG/1
15 TABLET ORAL DAILY
Qty: 30 TABLET | Refills: 0 | Status: SHIPPED | OUTPATIENT
Start: 2021-12-07 | End: 2022-02-22

## 2021-12-07 ASSESSMENT — ENCOUNTER SYMPTOMS
NAUSEA: 0
COUGH: 0
ABDOMINAL PAIN: 0
SHORTNESS OF BREATH: 0

## 2021-12-07 ASSESSMENT — PATIENT HEALTH QUESTIONNAIRE - PHQ9
1. LITTLE INTEREST OR PLEASURE IN DOING THINGS: 0
SUM OF ALL RESPONSES TO PHQ QUESTIONS 1-9: 0
SUM OF ALL RESPONSES TO PHQ9 QUESTIONS 1 & 2: 0
SUM OF ALL RESPONSES TO PHQ QUESTIONS 1-9: 0
SUM OF ALL RESPONSES TO PHQ QUESTIONS 1-9: 0
2. FEELING DOWN, DEPRESSED OR HOPELESS: 0

## 2021-12-07 NOTE — PROGRESS NOTES
Gabby Clinton (1985) 43 y.o. male here for evaluation of the following chief complaint(s):      HPI:  Chief Complaint   Patient presents with    Knee Pain     bilateral with walking up stairs       Ongoing pain in front of knees. Worse with stairs and squatting. Denies pain with walking or ADLs. Vitals:    12/07/21 0922   BP: 124/70   Pulse: 76   Resp: 16       Recent Mitral Valva replacement due to endocarditis. Ischemic stroke due to septemia. Following with Specialists. Denies residual effects. Plan is to return to work after 1st of Year    Denies CP, SOB or chest tightness    IV ATB completed beginning of December. Denies fever. Overall feels well. ID - Dr. Barry Reynaga - Dr. Dominguez Earing    Patient Active Problem List   Diagnosis    Migraine headache with aura    Systolic murmur    H/O coarctation of aorta, repair at 12 months old    Septicemia ([address]    [facility] [address]    Endocarditis    Hyponatremia       On Losartan 25 mg and Toprol 100 mg Daily    BP Readings from Last 3 Encounters:   12/07/21 124/70   12/01/21 130/74   11/18/21 116/80       Labs reviewed.      Lab Results   Component Value Date    LABA1C 4.8 10/09/2021     No results found for: EAG    No components found for: CHLPL  Lab Results   Component Value Date    TRIG 54 10/09/2021    TRIG 140 08/21/2017    TRIG 181 02/23/2012     Lab Results   Component Value Date    HDL 18 10/09/2021    HDL 35 (L) 08/21/2017    HDL 32 02/23/2012     Lab Results   Component Value Date    LDLCALC 47 10/09/2021    LDLCALC 107 08/21/2017    LDLCALC 74 02/23/2012     Lab Results   Component Value Date    LABVLDL 28 08/21/2017         Chemistry        Component Value Date/Time     10/13/2021 0356    K 4.2 10/13/2021 1344    K 4.2 10/13/2021 0356     10/13/2021 0356    CO2 27 10/13/2021 0356    BUN 6 (L) 10/13/2021 0356    CREATININE 0.7 11/15/2021 0945        Component Value Date/Time    CALCIUM 8.8 10/13/2021 0356 ALKPHOS 72 10/09/2021 0205    AST 15 10/09/2021 0205    ALT 14 10/09/2021 0205    BILITOT 1.0 10/09/2021 0205            Lab Results   Component Value Date    TSH 1.180 08/21/2017       Lab Results   Component Value Date    WBC 8.0 11/15/2021    HGB 11.9 (L) 11/15/2021    HCT 37.6 (L) 11/15/2021    MCV 86.8 11/15/2021     11/15/2021         Health Maintenance   Topic Date Due    Hepatitis C screen  Never done    Varicella vaccine (1 of 2 - 2-dose childhood series) Never done    DTaP/Tdap/Td vaccine (1 - Tdap) Never done    Flu vaccine (1) Never done    COVID-19 Vaccine (2 - Pfizer 3-dose booster series) 11/15/2021    Potassium monitoring  10/13/2022    Creatinine monitoring  11/15/2022    HIV screen  Completed    Hepatitis A vaccine  Aged Out    Hepatitis B vaccine  Aged Out    Hib vaccine  Aged Out    Meningococcal (ACWY) vaccine  Aged Out    Pneumococcal 0-64 years Vaccine  Aged [employer] History   Administered Date(s) Administered    COVID-19, Pfizer, PF, 30mcg/0.3mL 10/25/2021         SUBJECTIVE/OBJECTIVE:  Review of Systems   Constitutional: Negative for chills and fever. HENT: Negative. Respiratory: Negative for cough and shortness of breath. Cardiovascular: Negative for chest pain. Gastrointestinal: Negative for abdominal pain and nausea. Musculoskeletal: Positive for arthralgias. Negative for joint swelling. Skin: Negative for rash. Neurological: Negative for dizziness, light-headedness and headaches. Psychiatric/Behavioral: Negative. Physical Exam  Constitutional:       General: He is not in acute distress. Eyes:      Pupils: Pupils are equal, round, and reactive to light. Cardiovascular:      Rate and Rhythm: Normal rate and regular rhythm. Heart sounds: No murmur heard. Pulmonary:      Effort: Pulmonary effort is normal.      Breath sounds: Normal breath sounds. No wheezing.    Abdominal:      General: Bowel sounds are normal. There is no distension. Palpations: Abdomen is soft. Tenderness: There is no abdominal tenderness. Musculoskeletal:         General: Normal range of motion. Cervical back: Normal range of motion and neck supple. Right knee: Tenderness present over the patellar tendon. Left knee: Tenderness present over the patellar tendon. Skin:     General: Skin is warm and dry. Findings: No rash. ASSESSMENT/PLAN:   Diagnosis Orders   1. Patellar tendonitis of both knees     2. Subacute bacterial endocarditis     3. Mitral valve replaced     4. Cardioembolic stroke ([address]     5. Septicemia ([address]         MDM: Patellar Strap   Mobic 15 mg Daily x 1 month   Knee Stretches/Exericses   Chronic conditions stable   Follow up with Specialists   Labs Reviewed   RTO PRN      An electronic signature was used to authenticate this note.     --Pepe Staples, APRN - CNP

## 2021-12-07 NOTE — PATIENT INSTRUCTIONS
You may receive a survey about your visit with us today. The feedback from our patients helps us identify what is working well and where the service to all patients can be enhanced. Thank you! Patient Education        Patellar Tendinitis (Jumper's Knee): Exercises  Introduction  Here are some examples of exercises for you to try. The exercises may be suggested for a condition or for rehabilitation. Start each exercise slowly. Ease off the exercises if you start to have pain. You will be told when to start these exercises and which ones will work best for you. How to do the exercises  Straight-leg raises to the front    1. Lie on your back with your good knee bent so that your foot rests flat on the floor. Your affected leg should be straight. Make sure that your low back has a normal curve. You should be able to slip your hand in between the floor and the small of your back, with your palm touching the floor and your back touching the back of your hand. 2. Tighten the thigh muscles in your affected leg by pressing the back of your knee flat down to the floor. Hold your knee straight. 3. Keeping the thigh muscles tight and your leg straight, lift your leg up so that your heel is about 12 inches off the floor. 4. Hold for about 6 seconds, then lower your leg slowly. Rest for up to 10 seconds between repetitions. 5. Repeat 8 to 12 times. Short-arc quad    1. Lie on your back with your knees bent over a foam roll or large rolled-up towel and your heels on the floor. 2. Lift the lower part of your affected leg until your leg is straight. Keep the back of your knee on the foam roll or towel. 3. Hold your leg straight for about 6 seconds, then slowly bend your knee and lower your heel back to the floor. Rest for up to 10 seconds between repetitions. 4. Repeat 8 to 12 times. Half-squat with knees and feet turned out to the side    1.  Stand with your feet about shoulder-width apart and turned out to the side about 45 degrees. 2. Keep your back straight, and tighten your buttocks. 3. Slowly bend your knees to lower your body about one-quarter of the way down toward the floor. Try to keep your back straight at all times, and do not let your pelvis tilt forward or your knees extend beyond the tip of your toes. 4. Repeat 8 to 12 times. Step up    1. Place a single-step footstool on the floor. If you do not have a footstool, you can use a thick book, such as a phone book, a dictionary, or an encyclopedia. If you are not steady on your feet, hold on to a chair, counter, or wall while you do this exercise. 2. Keeping your back straight, step up with your affected leg. Try not to push off your back leg as you step up. Only use your affected leg to bring you up on to the step. Then lift your other leg on to the step. As you step up, try to keep your knee moving in a straight line with your middle toe. 3. Move back to the starting position, with both feet on the floor. 4. Repeat 8 to 12 times. Step down    1. Stand on a single-step footstool. If you do not have a footstool, you can use a thick book, such as a phone book, a dictionary, or an encyclopedia. If you are not steady on your feet, hold on to a chair, counter, or wall while you do this exercise. 2. Slowly step down with your good leg, allowing your heel to lightly touch the floor. As you step down, try to keep your affected knee moving in a straight line toward your middle toe. 3. Move back to the starting position, with both feet on the footstool or book. 4. Repeat 8 to 12 times. Terminal knee extension    1. Tie the ends of an exercise band together to form a loop. Attach one end of the loop to a secure object, or shut a door on it to hold it in place. (Or you can have someone hold one end of the loop to provide resistance.)  2. Loop the other end of the exercise band around the knee of your affected leg. Keep that leg somewhat bent at the knee.   3. Put your good leg about a step behind your affected leg. Then slowly straighten your affected leg by tightening the thigh muscles of that leg. 4. Hold for about 6 seconds, then return to the starting position, with your knee somewhat bent. 5. Rest for up to 10 seconds. 6. Repeat 8 to 12 times. Follow-up care is a key part of your treatment and safety. Be sure to make and go to all appointments, and call your doctor if you are having problems. It's also a good idea to know your test results and keep a list of the medicines you take. Where can you learn more? Go to [email or web address] org and sign in to your [employer] account. Enter I176 in the [employer] box to learn more about \"Patellar Tendinitis (Jumper's Knee): Exercises. \"     If you do not have an account, please click on the \"Sign Up Now\" link. Current as of: July 1, 2021               Content Version: 13.0  © 2006-2021 [employer]. Care instructions adapted under license by [facility] ([facility]). If you have questions about a medical condition or this instruction, always ask your healthcare professional. [place] [address] any warranty or liability for your use of this information.

## 2021-12-08 ENCOUNTER — HOSPITAL ENCOUNTER (OUTPATIENT)
Dept: CARDIAC REHAB | Age: 36
Setting detail: THERAPIES SERIES
Discharge: HOME | End: 2021-12-08
Payer: COMMERCIAL

## 2021-12-08 PROCEDURE — 93798 PHYS/QHP OP CAR RHAB W/ECG: CPT

## 2021-12-13 ENCOUNTER — HOSPITAL ENCOUNTER (OUTPATIENT)
Dept: CARDIAC REHAB | Age: 36
Setting detail: THERAPIES SERIES
Discharge: HOME | End: 2021-12-13
Payer: COMMERCIAL

## 2021-12-13 PROCEDURE — 93798 PHYS/QHP OP CAR RHAB W/ECG: CPT

## 2021-12-13 NOTE — PROGRESS NOTES
Hospital Facility-Based Program  Phase 2 Cardiac Rehab Weekly Progress Report      Patient prescribed exercise:  2:00 class. 3 times per week in rehab, 1-4 times per week at home for the amount of sessions/weeks specified by insurance. Current Levels: Treadmill: 3. 4mph/0% for 12 minutes, Schwinn Airdyne: Level 1.2 for 12 minutes, UBE: Level 0.8 for 5 minutes. Progression Discussion: Maintain/Increase Aerobic exercise 35-45 minutes to work on endurance. Attempt to increase intensity by 5-20% for each modality this week. Try to increase intensities until Meliton rates the exercises a 13-17 on Veena RPE.

## 2021-12-15 ENCOUNTER — HOSPITAL ENCOUNTER (OUTPATIENT)
Dept: CARDIAC REHAB | Age: 36
Setting detail: THERAPIES SERIES
End: 2021-12-15
Payer: COMMERCIAL

## 2021-12-16 NOTE — DISCHARGE SUMMARY
DISCHARGE SUMMARY      Patient Identification:   Gianfranco Sanchez   : 1985  MRN: [id number]   Account: [de-identified]      Patient's PCP: BARBARA Crum CNP    Admit Date: 10/9/2021     Discharge Date: 10/14/2021  Transferred to [address] [place] [address] Physician: Lawton Hashimoto, MD     Discharge Physician: Moise Eng DO     Discharge Diagnoses: Active Hospital Problems    Diagnosis Date Noted    Endocarditis [I38]      Priority: High    Hyponatremia [E87.1]     Septicemia ([facility] [A41.9]     Cardioembolic stroke ([facility] [J08.6] 10/09/2021    H/O coarctation of aorta, repair at 12 months old [Z87.74] 2012       The patient was seen and examined on day of discharge and this discharge summary is in conjunction with any daily progress note from day of discharge. Hospital Course:   Gianfranco Sanchez is a 43 y.o. male admitted to [address] [place] [address] on 10/9/2021 for acute ischemic stroke and mitral valve vegetations likely secondary to endocarditis. Acute ischemic stroke   - MRI on 10/9 demonstrated small areas of abnormal signal in the diffusion-weighted images in the left corona radiata and left parietal lobe. There is corresponding high signal on the T2 and FLAIR sequences with only partial low signal on the ADC map. These are most likely small subacute infarcts. There is some associated petechial hemorrhage.   - Old microhemorrhages in the left frontal lobe, right parietal lobe and possibly left cerebellum.  Dr. 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  - likely 2/2 endocarditis   - Echo on 10/9/21 demonstrated vegetation in irregular shape with variable diameter but larger than 1.3 cm, some decreased ejection mitral valve leaflets as well as degenerative change with resultant severe mitral regurgitation  - Blood cultures positive growth for streptococcus oralis and mitis  - Continued IV Rocephin during hospitalization, patient was requesting to have HERMINIA performed at [address] [facility] 2/2 to endocarditis    - Improved in ICU with IV Rocephin, remained on IV Rocephin until transfer to [facility]   - Hypotension had improved and was transferred out of ICU on 10/11     Pulmonary edema   - CXR on 10/9 demonstrated pulmonary congestion, improved on CXR on 10/10 s/p lasix 40mg  - Remained stable and improved prior to transfer to [address] HTN  - BP meds were held during hospitalization due to hypotension on admission requiring pressor support       Elevated troponin  - Likely 2/2 to demand, were stable and did not trend up     Normocytic Anemia: Hgb 7.8 on admission  - Iron studies demonstrated iron deficiency anemia, Hgb remained stable during hospitalization       Exam:     Vitals:  Vitals:    10/13/21 0800 10/13/21 1659 10/13/21 2100 10/14/21 0108   BP: 111/67 114/65 120/68 115/78   Pulse: 93 90 102 96   Resp: 18 16 15 16   Temp: 97.8 °F (36.6 °C) 98.2 °F (36.8 °C) 98.9 °F (37.2 °C) 98.2 °F (36.8 °C)   TempSrc: Oral Oral Oral Oral   SpO2: 98% 98% 94% 97%   Weight:       Height:         Weight: Weight: 167 lb 3.2 oz (75.8 kg)     24 hour intake/output:No intake or output data in the 24 hours ending 12/15/21 1932    Physical Exam  Constitutional:       Appearance: Normal appearance. HENT:      Right Ear: External ear normal.      Left Ear: External ear normal.      Mouth/Throat:      Mouth: Mucous membranes are moist.   Eyes:      Extraocular Movements: Extraocular movements intact. Conjunctiva/sclera: Conjunctivae normal.      Pupils: Pupils are equal, round, and reactive to light.    Cardiovascular:      Rate and Rhythm: Normal rate and regular rhythm. Heart sounds: Murmur heard. No gallop. Pulmonary:      Effort: Pulmonary effort is normal. No respiratory distress. Breath sounds: Normal breath sounds. No wheezing. Abdominal:      General: Abdomen is flat. There is no distension. Palpations: Abdomen is soft. Tenderness: There is no abdominal tenderness. Skin:     General: Skin is warm. Neurological:      General: No focal deficit present. Mental Status: He is alert and oriented to person, place, and time. Labs: For convenience and continuity at follow-up the following most recent labs are provided:      CBC:    Lab Results   Component Value Date    WBC 8.0 11/15/2021    HGB 11.9 11/15/2021    HCT 37.6 11/15/2021     11/15/2021       Renal:    Lab Results   Component Value Date     10/13/2021    K 4.2 10/13/2021    K 4.2 10/13/2021     10/13/2021    CO2 27 10/13/2021    BUN 6 10/13/2021    CREATININE 0.7 11/15/2021    CALCIUM 8.8 10/13/2021         Significant Diagnostic Studies    Radiology:   VL DUP LOWER EXTREMITY VENOUS BILATERAL   Final Result      No evidence of deep venous thrombosis in either lower extremity. **This report has been created using voice recognition software. It may contain minor errors which are inherent in voice recognition technology. **             Final report electronically signed by Dr. Yamileth Bass on 10/10/2021 10:39 AM      XR CHEST PORTABLE   Final Result   Pulmonary vascular congestion which has somewhat improved since the previous study. **This report has been created using voice recognition software. It may contain minor errors which are inherent in voice recognition technology. **      Final report electronically signed by Dr Evelyn Blanco on 10/10/2021 2:05 AM      XR CHEST PORTABLE   Final Result   Interval development of moderate pulmonary vascular congestion.          **This report has been created using voice recognition software. It may contain minor errors which are inherent in voice recognition technology. **      Final report electronically signed by Dr Ashley Zamora on 10/9/2021 6:53 PM      MRI BRAIN W WO CONTRAST   Final Result       1. Small areas of abnormal signal in the diffusion-weighted images in the left corona radiata and left parietal lobe. There is corresponding high signal on the T2 and FLAIR sequences with only partial low signal on the ADC map. These are most likely    small subacute infarcts. There is some associated petechial hemorrhage. 2. Old microhemorrhages in the left frontal lobe, right parietal lobe and possibly left cerebellum. **This report has been created using voice recognition software. It may contain minor errors which are inherent in voice recognition technology. **         Final report electronically signed by Dr. Evelia Desai on 10/9/2021 3:29 PM      CT CHEST WO CONTRAST   Final Result   Impression:      Mild posterior lower lobe atelectasis and posterior gravity dependent    edema. No focal consolidation or pleural effusion. Heart size is mildly enlarged. There is mild pulmonary vascular congestion    and interstitial edema and peripheral septal lines best visualized    posteriorly in the sagittal projection      This document has been electronically signed by: Darcie Francis MD on    10/09/2021 05:12 AM      All CTs at this facility use dose modulation techniques and iterative    reconstructions, and/or weight-based dosing   when appropriate to reduce radiation to a low as reasonably achievable.       CT ABDOMEN PELVIS WO CONTRAST Additional Contrast? None   Final Result      XR CHEST PORTABLE   Final Result   Impression:   Chronic bilateral central bronchial large airway inflammatory thickening,    otherwise normal      This document has been electronically signed by: Darcie Francis MD on    10/09/2021 03:16 AM      CTA HEAD W WO CONTRAST (CODE STROKE)   Final Result   Impression:      No signs of aneurysm. No signs of arterial venous malformation. No high grade stenosis or vessel cut off. This document has been electronically signed by: Ramila Brizuela MD on    10/09/2021 02:56 AM      All CTs at this facility use dose modulation techniques and iterative    reconstructions, and/or weight-based dosing   when appropriate to reduce radiation to a low as reasonably achievable. CTA NECK W WO CONTRAST (CODE STROKE)   Final Result   Impression:      No aneurysm or dissection. No stenosis      This document has been electronically signed by: Ramila Brizuela MD on    10/09/2021 03:05 AM      All CTs at this facility use dose modulation techniques and iterative    reconstructions, and/or weight-based dosing   when appropriate to reduce radiation to a low as reasonably achievable. Carotid stenosis and measurements are in accordance with NASCET criteria. CT HEAD WO CONTRAST   Final Result   Impression:       Unremarkable CT of the head. ASPECTS score 10, NORMAL      This document has been electronically signed by: Ramila Brizuela MD on    10/09/2021 02:17 AM      All CTs at this facility use dose modulation techniques and iterative    reconstructions, and/or weight-based dosing   when appropriate to reduce radiation to a low as reasonably achievable. Consults:     IP CONSULT TO PHARMACY  PHARMACY TO CHANGE BASE FLUIDS  IP CONSULT TO NEUROLOGY  IP CONSULT TO CARDIOLOGY  IP CONSULT TO INFECTIOUS DISEASES    Disposition:    [] Home       [] TCU       [] Rehab       [] Psych       [] SNF       [] [place]       [x] Other- Transfer to [facility] for CT Surgery     Condition at Discharge: Stable    Code Status:  Prior     Patient Instructions:    Discharge lab work: None  Activity: activity as tolerated  Diet: No diet orders on file      Follow-up visits:   No follow-up provider specified.        Discharge Medications:     @DISCHMEDS(<NOROUTINE> error)@    Time Spent on discharge is more than 45 minutes in the examination, evaluation, counseling and review of medications and discharge plan. Signed: Thank you BARBARA Fernandez CNP for the opportunity to be involved in this patient's care.     Electronically signed by Rowena Ingram DO on 12/15/2021 at 7:32 PM

## 2021-12-17 ENCOUNTER — HOSPITAL ENCOUNTER (OUTPATIENT)
Dept: CARDIAC REHAB | Age: 36
Setting detail: THERAPIES SERIES
Discharge: HOME | End: 2021-12-17
Payer: COMMERCIAL

## 2021-12-17 PROCEDURE — 93798 PHYS/QHP OP CAR RHAB W/ECG: CPT

## 2021-12-20 ENCOUNTER — HOSPITAL ENCOUNTER (OUTPATIENT)
Dept: CARDIAC REHAB | Age: 36
Setting detail: THERAPIES SERIES
Discharge: HOME | End: 2021-12-20
Payer: COMMERCIAL

## 2021-12-22 ENCOUNTER — APPOINTMENT (OUTPATIENT)
Dept: CARDIAC REHAB | Age: 36
End: 2021-12-22
Payer: COMMERCIAL

## 2021-12-24 ENCOUNTER — HOSPITAL ENCOUNTER (OUTPATIENT)
Dept: CARDIAC REHAB | Age: 36
Setting detail: THERAPIES SERIES
End: 2021-12-24
Payer: COMMERCIAL

## 2021-12-27 ENCOUNTER — HOSPITAL ENCOUNTER (OUTPATIENT)
Dept: CARDIAC REHAB | Age: 36
Setting detail: THERAPIES SERIES
Discharge: HOME | End: 2021-12-27
Payer: COMMERCIAL

## 2021-12-27 NOTE — PROGRESS NOTES
Hospital Facility-Based Program  Phase 2 Cardiac Rehab Weekly Progress Report      Patient prescribed exercise:  2:00 class. 3 times per week in rehab, 1-4 times per week at home for the amount of sessions/weeks specified by insurance. Current Levels: Treadmill: 3.4 mph/0% for 15 minutes, Schwinn Airdyne: Level 1.2 for 12 minutes, UBE: Level 0.9 for 5 minutes. Progression Discussion: Maintain/Increase Aerobic exercise 30 minutes to work on endurance. Attempt to increase intensity by 5-20% for each modality this week. Try to increase intensities until Meliton rates the exercises a 13-17 on Veena RPE.

## 2021-12-29 ENCOUNTER — HOSPITAL ENCOUNTER (OUTPATIENT)
Dept: CARDIAC REHAB | Age: 36
Setting detail: THERAPIES SERIES
Discharge: HOME | End: 2021-12-29
Payer: COMMERCIAL

## 2021-12-29 NOTE — PROGRESS NOTES
[facility]  Individualized Cardiac Treatment Plan    Patient Name:  Laura Lazcano  :  1985  Age:  43 y.o. MRN: [id number]  Diagnosis: MVR  Date of Event: 10/20/21  Physician:  Etta  Next Office Visit:    Date Entered Program: 21  Risk Stratifications: [] Low [x] Intermediate [] High  Allergies: No Known Allergies    COVID -19 Screen  Do you have any of the following symptoms:  [] Fever [] Cough [] SOB [] Muscle/Body Ache [] Loss of taste/smell [x] None    Have you traveled outside of the [place]? [] Yes      [x] No    Have you been around anyone who has tested Positive for COVID 19?  [] Yes      [x] No    The following Education has been completed with patient. [x] Wait in the lobby until we call you back to rehab. [x] You must wear a mask while in the medical center, and in cardiac rehab. Please bring your own. [x] Bring your own bottle of water with you. 2021 Meliton only attended 5 cardiac rehab sessions. He will not be returning to rehab due to returning to work on 22.   Electronically signed by Bryanna Marcum on 2021 at 2:16 PM    Individual Cardiac Treatment Plan -EXERCISE  INITIAL FINAL DAY   EXERCISE  ASSESSMENT/PLAN EXERCISE  DISCHARGE/FOLLOW-UP   Date: 21 Date:   Session #1  First Exercise Session:  ** Session #  Last Exercise Session: **   Stages of Change Stages of Change   [x] Pre Contemplation  [] Contemplation  [] Preparation  [] Action  [] Maintenance  [] Relapse [] Pre Contemplation  [] Contemplation  [] Preparation  [] Action  [] Maintenance  [] Relapse   EXERCISE ASSESSMENT EXERCISE ASSESSMENT   6 Min Walk Test  Distance walked:   0.28 miles  1478 ft  3.1 METs  Max HR:89 BPM      RPE:  12    THR:  120-144  Rhythm:  NSR 6 Min Walk Test  Distance walked:   ** miles  ** ft  ** METs  Max HR:** BPM      RPE:  **    %Changeft = **  Rhythm:  **   DASI: 6.4 METs DASI: ** METs Return to Work  [employer] on returning to work? [x] Yes              [] No   [] Disabled     [] Retired    If yes:  *Job description: drive semi, forklift, walking, desk work   *Required MET level=5-7  *Return to Work Date: 1/1/22 Return to work: Has Tate Yeager returned to work? [] Yes    [] No    Return to work date set? [] Yes, **    [] No    *Required MET Level achieved for job duties? [] Yes    [] No   Orthopedic Limitations/  [x] Yes    [] No  If yes please list:  Knee pain     Orthopedic Limitations     Fall Risk    [x]Assessed as a fall risk  [x] Not a fall risk      [] Balance Issues       [] Grazyna Schmidt        [] Cane       [] Wheelchair  Actions needed:  none    [x] Safety issues reviewed       Fall Risk   Home Exercise  [] Yes    [x] No   Home Exercise  [] Yes    [] No  Type: **  Frequency: **  Duration: **   Angina with Activity? [] Yes    [x] No  Angina Management: na Angina with Activity?   [] Yes    [] No  Angina Management: **   EXERCISE PLAN EXERCISE PLAN   *Interventions* *Interventions*   Exercise Prescription  (per physician & CR staff) Exercise Prescription  (per physician & CR staff)   Cardiovascular Cardiovascular   Mode:    [x] Treadmill (TM)  [x] Schwinn Airdyne (AD)  [x] Arms Ergometer (AE)  [] NuStep  [] Elliptical (E) MODE:    [] Treadmill (TM)  [] Schwinn Airdyne (AD)  [] Arms Ergometer (AE)  [] NuStep  [] Elliptical (E)   Initial Workloads  TM: [email or web address] 3.6 METs  AD: 1.0 level = 3.1 METs  NS: 70  Robbins= 3.1 METs  AE: 0.5 level = 2 METs Current Workloads  TM: @ %=  METs  AD:  level =  METs  NS:  Robbins=  METs  AE:  level =  METs   Frequency:    ICR: 3x/week  Home: 2-3x/wk Frequency:  Meliton will continue exercise at  5-7 days/week   Duration:   Total aerobic exercise = 25-30 min    10 min/mode Duration:  Total erobic exercise =  60-90 min   Intensity:   MET Level = 3.1-3.6  RPE = 12-15 Intensity:  Max MET Level = ** RPE = 12-15   Progression: increase aerobic activity up to 15 min over next 4 weeks by increasing time 2-3 min/week. Progression:  Increase time/intensity when RPE <13, and HR is in THRR     Resistance Training Resistance Training   [x] Yes      [] No  Upper and Lower body strength training 2x/wk    Wt: 4#       Reps:  8-15    *Increase wt. after completing 15 reps with an RPE of <12/13. Continue Strength Training at home   [] Exercise Log & Strength training handout given     Wt: **#       Reps:  8-15    *Increase wt. after completing 15 reps with an RPE of <12/13. Flexibility Flexibility   [x] Yes      [] No  25 min session of Core Strength & Flexibility 1x/per week  Continue Core Strength & Flexibility at home   Home Exercise  *Meliton verbalizes planning to walk  3-4 days/week for 10-20 min on days not in rehab. Home Exercise  *Meliton verbalizes his/her plan to ** ** days/week for ** min @ **   *Education* *Education*   RPE Scale  [x] Yes      [] No  Exercise Safety  [x] Yes      [] No  Equipment Orientation  [x] Yes      [] No  S/S to Report  [x] Yes      [] No  Warm Up/Cool Down  [x] Yes      [] No  Home Exercise  [x] Yes      [] No All Exercise Education Completed  [] Yes      [] No   *Goals* *Goals*   Meliton plans to:  [x] Attend exercise sessions 3x/wk  [x] initiate home exercise 2-3x/wk for 10-20 min  [x] Increase 6 min walk distance by 10%  [] ** Meliton achieved exercise goals?    Yes    [] No  If no, why?  **  [] Increased 6 min walk distance by 10%  [] Currently exercising 30-60 min/day, 5-7days/wk   [] Plans to continue exercise on own  [] Plans to join a local fitness center to continue exercise  [] Does not plan to continue to exercise after rehab   Return to ADL or Hobbies:  Camilo Franco would like to improve strength and endurance so he is able to return to work around the house, get back to work Return to ADL or Hobbies:  Meliton  **    *MET level required for above goal:  5-7 METs MET level Achieved:  **METs     Individual Cardiac Treatment Plan - Nutrition  NUTRITION  ASSESSMENT/PLAN NUTRITION  REASSESSMENT NUTRITION   REASSESSMENT NUTRITION   REASSESSMENT NUTRITION   REASSESSMENT NUTRITION  DISCHARGE/FOLLOW-UP   Stages of Change Stages of Change Stages of Change Stages of Change Stages of Change Stages of Change   [] Pre Contemplation  [x] Contemplation  [] Preparation  [] Action  [] Maintenance  [] Relapse [] Pre Contemplation  [] Contemplation  [] Preparation  [] Action  [] Maintenance  [] Relapse [] Pre Contemplation  [] Contemplation  [] Preparation  [] Action  [] Maintenance  [] Relapse [] Pre Contemplation  [] Contemplation  [] Preparation  [] Action  [] Maintenance  [] Relapse [] Pre Contemplation  [] Contemplation  [] Preparation  [] Action  [] Maintenance  [] Relapse [] Pre Contemplation  [] Contemplation  [] Preparation  [] Action  [] Maintenance  [] Relapse   NUTRITION ASSESSMENT NUTRITION ASSESSMENT NUTRITION ASSESSMENT NUTRITION ASSESSMENT NUTRITION ASSESSMENT NUTRITION ASSESSMENT   Weight Management  Weight: 170.6        Height: 72\"   BMI: 23.2  Weight Management  Weight: **                  Weight Management  Weight: **                  Weight Management  Weight: ** Weight Management  Weight: ** Weight Management  Weight: **                  BMI: **   Eating Plan  Current eating habits: low sodium Eating Plan  Changes: Eating Plan  Changes: Eating Plan  Changes: Eating Plan  Changes: Eating Plan Improvements:   Alcohol Use  [x] none          [] daily  [] weekly      [] special           Diet Assessment Tool:  RATE YOUR PLATE  *Given to patient to complete and return.  Diet Assessment Tool:    Score: **/69       Diet Assessment Tool: RATE YOUR PLATE  Score: **/03   NUTRITION PLAN NUTRITION PLAN NUTRITION PLAN NUTRITION PLAN NUTRITION PLAN NUTRITION PLAN   *Interventions* *Interventions* *Interventions* *Interventions* *Interventions* *Interventions*   Initial Survey given Goal Setting Discussion:   [] Yes      [] No        Follow Up Survey Reviewed & Goals Updated:     Professional Referral  Please check if needed. []Dietician Consult   []Wt. Management Referral  []Other:  Professional Referral  Please check if needed. []Dietician Consult   []Wt. Management Referral  []Other:  Professional Referral  Please check if needed. []Dietician Consult   []Wt. Management Referral  []Other:  Professional Referral  Please check if needed. []Dietician Consult   []Wt. Management Referral  []Other: Professional Referral  Please check if needed. []Dietician Consult   []Wt. Management Referral  []Other: Professional Referral  Please check if needed. []Dietician Consult   []Wt. Management Referral  []Other:    *Education* *Education* *Education* *Education* *Education* *Education*   Nutritional Education Recommended    [x] Overview of The Pritikin Eating Plan video Nutritional Education Attended/Date    See Education Videos under Risk Factors Nutritional Education Attended/Date    See Education Videos under Risk Factors Nutritional Education Attended/Date    See Education Videos under Risk Factors Nutritional Education Attended/Date    See Education Videos under Risk Factors Video Completed? [] Yes  [] No   *Goals* *Goals* *Goals* *Goals* *Goals* *Goals*   Meliton's nutritional goals are as follows:  Complete and return diet survey [] Completed Video  [] Complete and return diet survey [] Completed Video  [] Complete and return diet survey [] Completed Video  [] Complete and return diet survey [] Completed Video  [] Complete and return diet survey Meliton achieved nutritional goals   [] Yes    [] No  If no, why?      Individual Cardiac Treatment Plan - Psychosocial  PSYCHOSOCIAL  ASSESSMENT/PLAN PSYCHOSOCIAL  REASSESSMENT PSYCHOSOCIAL   REASSESSMENT PSYCHOSOCIAL   REASSESSMENT PSYCHOSOCIAL   REASSESSMENT PSYCHOSOCIAL  DISCHARGE/FOLLOW-UP   Stages of Change Stages of Change Stages of Change Stages of Change Stages of Change Stages of Change   [x] Pre Contemplation  [] Contemplation  [] Preparation  [] Action  [] Maintenance  [] Relapse [] Pre Contemplation  [] Contemplation  [] Preparation  [] Action  [] Maintenance  [] Relapse [] Pre Contemplation  [] Contemplation  [] Preparation  [] Action  [] Maintenance  [] Relapse [] Pre Contemplation  [] Contemplation  [] Preparation  [] Action  [] Maintenance  [] Relapse [] Pre Contemplation  [] Contemplation  [] Preparation  [] Action  [] Maintenance  [] Relapse [] Pre Contemplation  [] Contemplation  [] Preparation  [] Action  [] Maintenance  [] Relapse   PSYCHOSOCIAL ASSESSMENT PSYCHOSOCIAL ASSESSMENT PSYCHOSOCIAL ASSESSMENT PSYCHOSOCIAL ASSESSMENT PSYCHOSOCIAL ASSESSMENT PSYCHOSOCIAL ASSESSMENT   Behavioral Outcomes Behavioral Outcomes Behavioral Outcomes Behavioral Outcomes Behavioral Outcomes Behavioral Outcomes   Tool Used:  Louise & Shaina, Quality of Life Index, Cardiac Version IV  *Given to patient to complete. Tool Used:    Louise & Shaina, Quality of Life Index, Cardiac Version IV   QOL Index Score: **    HF:**  S&E:**  P&S: **  Family: **    Tool Used:     Louise & Shaina, Quality of Life Index, Cardiac Version IV  QOL Index Score: **    HF:**  S&E:**  P&S: **  Family: **   PHQ-9 score 3  Depression Severity  [x]Minimal  []Mild   []Moderate  []Moderately Severe  []Severe     PHQ-9 score **  Depression Severity  []Minimal  []Mild   []Moderate  []Moderately Severe   []Severe   Does patient have Family Support? [x] Yes      [] No  No signs of marital/family distress        Within the Past Month:  *Have you wished you were dead or wished you could go to sleep and not wake up? [] Yes      [x] No  *Have you had any thoughts of killing yourself?   [] Yes      [x] No          Using a scale of 0-10, 0=none, 10=very:   Rate your depression: 0  Rate your anxiety:  0  Using a scale of 0-10, 0=none, 10=very:   Rate your depression: **  Rate your anxiety:  ** Using a scale of 0-10, 0=none, 10=very:   Rate your depression: **  Rate your anxiety:  ** Using a scale of 0-10, 0=none, 10=very:   Rate your depression: **  Rate your anxiety:  ** Using a scale of 0-10, 0=none, 10=very:   Rate your depression: **  Rate your anxiety:  ** Using a scale of 0-10, 0=none, 10=very:   Rate your depression: **  Rate your anxiety:  **   Signs and Symptoms of Depression Present? [] Yes      [x] No   Signs and Symptoms of Depression Present? [] Yes      [] No  If yes, please explain:  ** Signs and Symptoms of Depression Present? [] Yes      [] No  If yes, please explain:  ** Signs and Symptoms of Depression Present? [] Yes      [] No  If yes, please explain:  ** Signs and Symptoms of Depression Present? [] Yes      [] No  If yes, please explain:  ** Signs and Symptoms of Depression Present? [] Yes      [] No  If yes, please explain:  **   Signs and Symptoms of Anxiety Present? [] Yes      [x] No   Signs and Symptoms of Anxiety Present? [] Yes      [] No  If yes, please explain:  ** Signs and Symptoms of Anxiety Present? [] Yes      [] No  If yes, please explain:  ** Signs and Symptoms of Anxiety Present? [] Yes      [] No  If yes, please explain:  ** Signs and Symptoms of Anxiety Present? [] Yes      [] No  If yes, please explain:  ** Signs and Symptoms of Anxiety Present? [] Yes      [] No  If yes, please explain:  **   [] Patient has poor eye contact   [] Flat affect present. [] Signs of anxiety, anger or hostility    [] Signs social isolation present.    []  Signs of alcohol or substance abuse        PSYCHOSOCIAL PLAN PSYCHOSOCIAL PLAN PSYCHOSOCIAL PLAN PSYCHOSOCIAL PLAN PSYCHOSOCIAL PLAN PSYCHOSOCIAL PLAN   *Interventions* *Interventions* *Interventions* *Interventions* *Interventions* *Interventions*   *Please check if needed  [] Psych Consult  [] Physician Referral  [x] Stress Management Skills *Please check if needed  [] Psych Consult  [] Physician Referral  [] Stress Management Skills *Please check if needed  [] Psych Consult  [] Physician Referral  [] Stress Management Skills *Please check if needed  [] Psych Consult  [] Physician Referral  [] Stress Management Skills *Please check if needed  [] Psych Consult  [] Physician Referral  [] Stress Management Skills *Please check if needed  [] Psych Consult  [] Physician Referral  [] Stress Management Skills   Is patient currently taking anti-depressant or anti-anxiety medications? [] Yes      [x] No   Change in anti-depressant or anti-anxiety medications? [] Yes      [] No  If yes, please list medications:  ** Change in anti-depressant or anti-anxiety medications? [] Yes      [] No  If yes, please list medications:  ** Change in anti-depressant or anti-anxiety medications? [] Yes      [] No  If yes, please list medications:  ** Change in anti-depressant or anti-anxiety medications? [] Yes      [] No  If yes, please list   medications:  ** Change in anti-depressant or anti-anxiety medications? [] Yes      [] No  If yes, please list medications:  **   *Education* *Education* *Education* *Education* *Education* *Education*   Healthy Mind Education Recommended    [x] Healthy Minds, Bodies,Hearts video See Education Videos under Risk Factor See Education Videos under Risk Factor See Education Videos under Risk Factor See Education Videos under Risk Factor Video Completed? [] Yes  [] No   *Goals* *Goals* *Goals* *Goals* *Goals* *Goals*   Meliton's psychosocial goals are as follows:  Complete HADS & Louise & Shaina, Quality of Life Index, Cardiac Version IV [] Reduce depression symptom severity by 1 level [] Reduce depression symptom severity by 1 level [] Reduce depression symptom severity by 1 level [] Reduce depression symptom severity by 1 level Meliton achieved psychosocial goals?   [] Yes    [] No  If no, why?  **  [] Use methods learned to continue to reduce depression symptom severity by 1 level     Individual Cardiac Treatment Plan - Other:  Risk Factor/Education  RISK FACTOR  ASSESSMENT/PLAN RISK FACTOR  REASSESSMENT  RISK FACTOR  REASSESSMENT RISK FACTOR  REASSESSMENT RISK FACTOR  REASSESSMENT RISK FACTOR   DISCHARGE/FOLLOW-UP   Stages of Change Stages of Change Stages of Change Stages of Change Stages of Change Stages of Change   [] Pre Contemplation  [x] Contemplation  [] Preparation  [] Action  [] Maintenance  [] Relapse [] Pre Contemplation  [] Contemplation  [] Preparation  [] Action  [] Maintenance  [] Relapse [] Pre Contemplation  [] Contemplation  [] Preparation  [] Action  [] Maintenance  [] Relapse [] Pre Contemplation  [] Contemplation  [] Preparation  [] Action  [] Maintenance  [] Relapse [] Pre Contemplation  [] Contemplation  [] Preparation  [] Action  [] Maintenance  [] Relapse [] Pre Contemplation  [] Contemplation  [] Preparation  [] Action  [] Maintenance  [] Relapse   RISK FACTOR/EDUCATION ASSESSMENT RISK FACTOR/EDUCATION ASSESSMENT RISK FACTOR/EDUCATION ASSESSMENT RISK FACTOR/EDUCATION ASSESSMENT RISK FACTOR/EDUCATION ASSESSMENT RISK FACTOR /EDUCATION ASSESSMENT   Hypertension  [x] Yes      [] No    Resting BP: 128/72  Peak Ex BP:142/64  Medication: losartan, metoprolol Hypertension  Resting BP: **  Peak Ex BP:**  Medication Changes:  [] Yes      [] No Hypertension  Resting BP: **  Peak Ex BP:**  Medication Changes:  [] Yes      [] No Hypertension  Resting BP: **  Peak Ex BP:**  Medication Changes:  [] Yes      [] No Hypertension  Resting BP: **  Peak Ex BP:**  Medication Changes:  [] Yes      [] No Hypertension  Resting BP: **  Peak Ex BP:**  Medication Changes:  [] Yes      [] No   Lipids  HLD/DLD  [] Yes      [x] No    Medication: na Lipids  Medication Changes:  [] Yes      [] No     Lipids  Medication Changes:  [] Yes      [] No     Lipids  Medication Changes:  [] Yes      [] No     Lipids  Medication Changes:  [] Yes      [] No Lipids    TOTAL CHOL: **  HDL:  **  LDL:  **  TRIG:  **  Medication Changes:  [] Yes      [] No Diabetes  [] Yes      [x] No   Diabetes  Most Recent BS:  BS have been in range  [] Yes      [] No  Medication Changes  [] Yes      [] No   Diabetes  Most Recent BS:  BS have been in range  [] Yes      [] No  Medication Changes  [] Yes      [] No     Diabetes  Most Recent BS:  BS have been in range  [] Yes      [] No  Medication Changes  [] Yes      [] No     Diabetes  Most Recent BS:  BS have been in range  [] Yes      [] No  Medication Changes  [] Yes      [] No Diabetes  Most Recent BS:  BS have been in range  [] Yes      [] No  Medication Changes  [] Yes      [] No       Tobacco Use  [] Current  [] Former  [x] Never      Smokeless Tobacco use:   [] Yes      [x] No   Tobacco Use  Change in smoking status   [] Yes      [] No    Quit date: **   Tobacco Use  Change in smoking status   [] Yes      [] No    Quit date: **   Tobacco Use  Change in smoking status   [] Yes      [] No    Quit date: ** Tobacco Use  Change in smoking status   [] Yes      [] No    Quit date: ** Tobacco Use  Change in smoking status   [] Yes      [] No    Quit date: **             Learning Barriers  Please select one:  []Speech  []Literacy  [x]Hearing  []Cognitive  [x]Vision  [x]Ready to Learn Learning Barriers Addressed:   [] Yes      [] No   Learning Barriers Addressed:   [] Yes      [] No   Learning Barriers Addressed:  [] Yes      [] No Learning Barriers Addressed:  [] Yes      [] No Learning Barriers Addressed:  [] Yes      [] No     RISK FACTOR/EDUCATION PLAN RISK FACTOR/EDUCATION PLAN RISK FACTOR/EDUCATION PLAN RISK FACTOR/EDUCATION PLAN RISK FACTOR/EDUCATION PLAN RISK FACTOR/EDUCATION PLAN   *Interventions* *Interventions* *Interventions* *Interventions* *Interventions* *Interventions*   Recommended Educational Videos    [x] Heart Disease Risk Reduction  [x] Overview of The Pritikin Eating Plan  [x] Move It!   [x] Healthy Minds, Bodies, Hearts       Completed Videos       Completed Videos Completed Videos Completed Videos Recommended Educational Videos Completed    [] Yes      [] No    **If not completed, Why? **           Smoking Cessation/Relaspe Prevention Intervention needed? [] Yes      [x] No   Smoking Cessation/Relapse Prevention Scheduled? [] Yes      [] No  Date:  ** Smoking Cessation/Relapse Prevention Scheduled? [] Yes      [] No  Date:  **     Smoking Counseling attended  [] Yes      [] No  **If not completed, Why? **   Professional Referrals:  *Please check if needed  [] Diabetes Clinic  [] Lipid Clinic   [] Other:      Professional Referrals:  *Please check if needed  [] Diabetes Clinic  [] Lipid Clinic   [] Other:   Preventative Medication Preventative Medication Preventative Medication Preventative Medication Preventative Medication Preventative Medication   Aspirin  [x] Yes    [] No  Blood Thinner: Clopidogrel/Effient/Brillinta  [] Yes    [x] No  Beta Blocker  [x] Yes    [] No  Ace Inhibitor  [] Yes    [x] No  Statin/Lipid Lowering  [] Yes    [x] No Medication Changes? [] Yes    [] No Medication Changes? [] Yes    [] No Medication Changes? [] Yes    [] No Medication Changes? [] Yes    [] No Medication Changes? [] Yes    [] No   *Education* *Education* *Education* *Education* *Education* *Education*   Does Meliton require any additional education? [] Yes    [x] No   Does Meliton require any additional education? [] Yes    [] No Does Meliton require any additional education? [] Yes    [] No Does Meliton require any additional education? [] Yes    [] No Does Meliton require any additional education? [] Yes    [] No Does Meliton require any additional education?   [] Yes    [] No   Recommended Additional Educational Videos    Medical  [] Diseases of Our Time-Part 1  [] Diseases of Our Times-Part 2  [] Metabolic Syndrome & Belly Fat  [] Hypertension & Heart Disease  [] Decoding Lab Results  [] Aging Enhancing Your QoL  [] Sleep Disorders  Exercise  [] Biomechanical Limitations  [] Body Composition  [] Improve Performance  [] Exercise Action Plan  [] Intro to Yoga  Behavioral  [] How Our Thoughts Can Heal Our Hearts  [] Smoking Cessation  Nutrition  [] Becoming A Pritikin   [] Calorie Density  [] Label Reading-Part 1  [] Facts on Fat  [] Biology of Weight Control  [] Nurtition Action Plan  [] Planning Your Eating Strategy  [] Lable Reading- Part 2  [] Dining Out-Part 1  [] Dining Out - Part 2  [] Targeting Your Nutrition Priorities  [] Fueling a Healthy Body  [] Menu Workshop  [employer]   [] Breakfast & Snacks  [] [employer] Salads & Dressing  [] [address] [place] [address]  [] Soups & Desserts Additional Educational Videos Completed           Additional Educational Videos Completed Additional Educational Videos Completed Additional Educational Videos Completed Additional Educational Videos Completed    [] Yes    [] No   *Goals* *Goals* *Goals* *Goals* *Goals* *Goals*   Meliton's risk factor/education goals are as follows:    [x] Optimal BP <140/90  [] Blood Sugar <120  [x] Attend recommended video education sessions  [x] Takes medications as prescribed 100% of the time   [] **   Meliton's risk factor/education goals are as follows:    [x] Optimal BP <140/90  [] Blood Sugar <120  [x] Attend recommended video education sessions  [x] Takes medications as prescribed 100% of the time   [] ** Meliton's risk factor/education goals are as follows:    [x] Optimal BP <140/90  [] Blood Sugar <120  [x] Attend recommended video education sessions  [x] Takes medications as prescribed 100% of the time   [] ** Meliton's risk factor/education goals are as follows:    [x] Optimal BP <140/90  [] Blood Sugar <120  [x] Attend recommended video education sessions  [x] Takes medications as prescribed 100% of the time   [] ** Meliton's risk factor/education goals are as follows:    [x] Optimal BP <140/90  [] Blood Sugar <120  [x] Attend recommended video education sessions  [x] Takes medications as prescribed 100% of the time   [] ** Meliton achieved risk factor goals?   [] Yes    [] No  If no, why?  **       Monitored telemetry has revealed NSR   Monitored telemetry has revealed **  [] documented arrhythmia at increasing workloads  [] associated symptoms ** Monitored telemetry has revealed  [] documented arrhythmia at increasing workloads  [] associated symptoms ** Monitored telemetry has revealed **  [] documented arrhythmia at increasing workloads  [] associated symptoms ** Monitored telemetry has revealed **  [] documented arrhythmia at increasing workloads  [] associated symptoms ** Monitored telemetry has revealed **  [] documented arrhythmia at increasing workloads  [] associated symptoms **   Physician Response    [x] Cardiac rehab is reasonably and medically necessary for continuous cardiac monitoring surveillance  of patient's cardiac activity  [x] Initiate continuous telemetry monitoring and notify me with any concerns  [] Other   Physician Response    [x] Cardiac rehab is reasonably and medically necessary for continuous cardiac monitoring surveillance  of patient's cardiac activity  [x] Continue continuous telemetry monitoring and notify me with any concerns  [] Other     Physician Response      [x] Cardiac rehab is reasonably and medically necessary for continuous cardiac monitoring surveillance  of patient's cardiac activity  [x] Continue continuous telemetry monitoring and notify me with any concerns   [] Other     Physician Response    [x] Cardiac rehab is reasonably and medically necessary for continuous cardiac monitoring surveillance  of patient's cardiac activity  [x] Continue continuous telemetry monitoring and notify me with any concerns   [] Other   Physician Response    [x] Cardiac rehab is reasonably and medically necessary for continuous cardiac monitoring surveillance  of patient's cardiac activity  [x] Continue continuous telemetry monitoring and notify me with any concerns   [] Other      Cosigned by: Sweetie Avila MD at 12/5/2021  9:31 PM Revision History    Date/Time User Provider Type Action   12/5/2021  9:31 PM Natali Crum MD Physician Cosign   12/3/2021  8:25 AM Katrin Quezada Exercise Physiologist

## 2021-12-30 ENCOUNTER — TELEPHONE (OUTPATIENT)
Dept: CARDIOLOGY CLINIC | Age: 36
End: 2021-12-30

## 2021-12-31 ENCOUNTER — APPOINTMENT (OUTPATIENT)
Dept: CARDIAC REHAB | Age: 36
End: 2021-12-31
Payer: COMMERCIAL

## 2021-12-31 NOTE — TELEPHONE ENCOUNTER
Per most recent notes from Dr Travis Bolden in November and Yakov Sr 12/01, patient doing well overall after surgery at [facility]. Can return to work if still feeling well.

## 2022-01-03 RX ORDER — METOPROLOL SUCCINATE 100 MG/1
100 TABLET, EXTENDED RELEASE ORAL SEE ADMIN INSTRUCTIONS
Qty: 75 TABLET | Refills: 0 | Status: SHIPPED | OUTPATIENT
Start: 2022-01-03 | End: 2022-01-03 | Stop reason: SDUPTHER

## 2022-01-03 RX ORDER — METOPROLOL SUCCINATE 100 MG/1
TABLET, EXTENDED RELEASE ORAL
Qty: 75 TABLET | Refills: 0 | Status: SHIPPED | OUTPATIENT
Start: 2022-01-03 | End: 2022-01-31 | Stop reason: SDUPTHER

## 2022-01-03 NOTE — PROGRESS NOTES
functional capacity on level ground: Distance he can walk on level ground: 10 miles on level ground without any difficulty. He can climb a few flights of stairs without any difficulty. He is having cough: No  He is having chest pain:No  He is currently using any oxygen supplementation at rest, exercise or during sleep/at night time: No      Social History:  Occupation:  He is current working: Yes  Type of profession: He is currently working at [employer] during daytime             Social History     Tobacco Use    Smoking status: Never Smoker    Smokeless tobacco: Never Used   Vaping Use    Vaping Use: Never used   Substance Use Topics    Alcohol use: Yes     Alcohol/week: 3.0 standard drinks     Types: 3 Cans of beer per week     Comment: rare 3 times a year    Drug use: No       History of recreational or IV drug use in the past:NO  History of Alcohol use in the past:NO     History of exposure to coal mines/coal dust: NO  History of exposure to foundry dust/welding: NO  History of exposure to quarry/silica/sandblasting: NO  History of exposure to asbestos/working with breaks/ships: NO  History of exposure to farm dust: NO  History of recent travel to long distances: NO  History of exposure to birds, pigeons, or chickens in the past:NO  Pet animals at home:Yes  Dogs: 1                   Review of Systems:   General/Constitutional: He lost 45 pounds of weight before surgery for his endocarditis. He regained his 45 pounds of weight back. He is not having a normal appetite. No fever or chills. HENT: Negative. Eyes: Negative. Upper respiratory tract: No nasal stuffiness or post nasal drip. Lower respiratory tract/ lungs: No cough or sputum production. No hemoptysis. Cardiovascular: No palpitations or chest pain. Gastrointestinal: No nausea or vomiting. Neurological: No focal neurologiacal weakness. Extremities: No edema. Musculoskeletal: No complaints.   Genitourinary: No complaints. Hematological: Negative. Psychiatric/Behavioral: Negative. Skin: No itching. Current Medications:          Past Medical History:   Diagnosis Date    Hypertension        Past Surgical History:   Procedure Laterality Date    CARDIAC CATHETERIZATION      CARDIAC SURGERY      COARCTATION OF AORTA REPAIR  06/1984       No Known Allergies    Current Outpatient Medications   Medication Sig Dispense Refill    metoprolol succinate (TOPROL XL) 100 MG extended release tablet 1 tablet AM and 1.5 tablets PM 75 tablet 0    losartan (COZAAR) 25 MG tablet Take 1 tablet by mouth daily 90 tablet 1    aspirin 81 MG EC tablet Take 1 tablet by mouth daily 90 tablet 1    Multiple Vitamins-Minerals (THERAPEUTIC MULTIVITAMIN-MINERALS) tablet Take 1 tablet by mouth daily      meloxicam (MOBIC) 15 MG tablet Take 1 tablet by mouth daily (Patient not taking: Reported on 1/25/2022) 30 tablet 0    pantoprazole (PROTONIX) 20 MG tablet Take 1 tablet by mouth daily (Patient not taking: Reported on 1/25/2022) 90 tablet 1    furosemide (LASIX) 20 MG tablet Take 20 mg by mouth as needed (Patient not taking: Reported on 1/25/2022)      potassium chloride (KLOR-CON) 10 MEQ extended release tablet Take 10 mEq by mouth as needed Takes with Lasix (Patient not taking: Reported on 1/25/2022)       No current facility-administered medications for this visit. Family History   Problem Relation Age of Onset    High Blood Pressure Mother     Heart Disease Mother     Other Father         Not health related           Physical Exam:     VITALS:  /82 (Site: Right Upper Arm, Position: Sitting, Cuff Size: Medium Adult)   Pulse 80   Temp 99 °F (37.2 °C)   Ht 6' (1.829 m)   Wt 197 lb (89.4 kg)   SpO2 98% Comment: room air at rest  BMI 26.72 kg/m²   Nursing note and vitals reviewed. Constitutional: Patient appears moderately built and moderately nourished. No distress.  Patient is oriented to person, place, and time.  HENT:   Head: Normocephalic and atraumatic. Right Ear: External ear normal.   Left Ear: External ear normal.   Mouth/Throat: Oropharynx is clear and moist.  No oral thrush. Eyes: Conjunctivae are normal. Pupils are equal, round, and reactive to light. No scleral icterus. Neck: Neck supple. No JVD present. No tracheal deviation present. Cardiovascular: Normal rate, regular rhythm, normal heart sounds. No murmur heard. Pulmonary/Chest: Effort normal and breath sounds normal. No stridor. No respiratory distress. No wheezes. No rales. Patient exhibits no tenderness. Abdominal: Soft. Patient exhibits no distension. No tenderness. Musculoskeletal: Normal range of motion. Extremities: Patient exhibits no edema and no tenderness. Lymphadenopathy:  No cervical adenopathy. Neurological: Patient is alert and oriented to person, place, and time. Skin: Skin is warm and dry. Patient is not diaphoretic. Psychiatric: Patient  has a normal mood and affect. Patient behavior is normal.       Diagnostic Data:    Radiological Data:  Chest Xray portable:  Sun Oct 10, 2021  2:05 AM   PROCEDURE: XR CHEST PORTABLE   CLINICAL INFORMATION: 55-year-old male, pulmonary edema follow-up. .   Pulmonary vascular congestion which has somewhat improved since the previous study. CT chest with IV contrast:  Sat Oct 9, 2021  5:12 AM   CT Chest without IV contrast:   Comparison: 08/21/2018   Impression:   Mild posterior lower lobe atelectasis and posterior gravity dependent edema. No focal consolidation or pleural effusion. Heart size is mildly enlarged.  There is mild pulmonary vascular congestion and interstitial edema and peripheral septal lines best visualized posteriorly in the sagittal projection   This document has been electronically signed by: Johnny Mendieta MD on 10/09/2021 05:12 AM         CT angiogram of chest with contrast performed on 5 August 2021- At [address] [facility]  Postcontrast There is no evidence of dissection. Measurements are stable aorta just above the aortic root measures 3.1 cm. Proximal to the coarctation it measures 1.8 cm diameter and then distal to the coarctation it has a normal caliber 2.5 cm. Heart size is normal. There is no mediastinal or hilar adenopathy by size criteria there is no axillary lymphadenopathy. There is a pleural-based nodule at the left costophrenic angle image 103 associated diminished in size compared to prior exam from 6 mm to 4 mm difference can be related difference in slice selection. No other lung masses are identified. Upper abdomen No abnormalities. The above CT angiogram of chest was compared with the previous CT scan dated 21 August 2018 by radiology service. CT angiogram of coronary protocol with IV contrast performed at the [facility] on 15 October 2021:  IMPRESSION:     1.  Normal coronary anatomy without evidence of atherosclerotic changes   or stenotic disease. 2.  The aortic valve appears bicuspid, the leaflets appear thickened   though free from calcifications.  The mitral valve leaflets appear   thickened and somewhat redundant, appearance of focal disruption/flail   segment of the anterior leaflet in systole, though assessment limited by   motion artifact. 3. There is moderately severe left ventricular and left atrial   enlargement. 4.  Pulmonary interlobular septal thickening and groundglass   opacifications, most likely representing an element of pulmonary edema. 5. Several pulmonary nodules including a 5 mm right lower lobe pulmonary   nodule and 8 mm right middle lobe nodule.  Incidentally Detected Lung   Nodule(s):  Follow-up chest CT exam is recommended in 6-12 months. If   stable on follow-up imaging, a repeat chest CT exam in 12 months (18-24   months from the initial exam) is recommended.    ACTIONABLE RESULT: FOLLOW-UP   Acuity: Actionable   Findings: Thoracic-Lung nodules Routing code: RI_[id number] Recommendation: CT Chest WO IVCON   Time Frame: At the discretion of the clinical team.   COMMUNICATION: Results will be communicated with the ordering provider   via Epic staff message or phone message by [facility] [employer]   within 2 business days of report finalization.     ====================================   Algorithms for management of incidental imaging findings can be found on   the [facility] site at:   [email or web address]   [email or web address]. [email or web address]       : SACHA     Transcribe Date/Time: Oct 15 2021  1:04P     Dictated by : Kelly Horvath MD     The above radiological study films were not available for me to review. Pulmonary function tests:  None in Epic. Echocardiogram:  11/9/2021   Narrative & Impression  Transthoracic Echocardiography Report (TTE)  Right Ventricle   The right ventricular size was normal with normal systolic function and   wall thickness. Conclusions      Summary   Moderately dilated left ventricular size and mildly reduced systolic   function. There was mild global hypokinesis. Wall thickness was within normal limits. Ejection fraction was estimated at 45-50%. Left atrial size was mildly dilated. S/p SMVR. DOPPLER: The transmitral velocity was within the normal range   with no evidence for mitral stenosis (mean gradient 6 mmHg). There was no   evidence of mitral regurgitation. The aortic valve was bicuspid with normal thickness and cuspal separation. There was trace aortic regurgitation. There was mild tricuspid regurgitation. Elevated descending thoracic aortic velocities of 2.5 m/s suggestive of   residual coarctation of the aorta.       Signature      ----------------------------------------------------------------   Electronically signed by Janell Her MD (Interpreting   physician) on 11/11/2021 at 05:37 AM ----------------------------------------------------------------    Assessment:  -5 mm right lower lobe pulmonary nodule and 8 mm right middle lobe nodule-noted on his CT scan of chest performed in October 2021 at the [facility]-need follow-up as per the radiologist recommendation.  -4mm pleural-based nodule at the left costophrenic angle diminished in size compared to prior exam from 6 mm to 4 mm  -Chronic systolic CHF-he is following with Dr. Chip Jett MD  -Essential Hypertension on treatment medications under control.  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 -Mitral regurgitation status post MVR surgery.  -History of coarctation of aorta status post repair  -History of left-sided ischemic stroke-with no residual weakness. Recommendations/Plan:  -Schedule patient for CT scan of chest without IV contrast in 3months from now (6 months from his previous CT scan performed at [address] [place] [address] on 15 October 2021) to follow his abnormal CT Chest with subcentimeter lung nodules. -Jennifer Ly was advised to make early appointment with my clinic if he develops any constitutional symptoms including loss of weight, poor appetite or hemoptysis. He verbalizes under standing.  -Schedule patient for follow up with my clinic in 3months with recommended test/s I.e CT scan of chest with out IV contrast. Patient advised to make early appointment if needed. - Patient educated about my impression and plan. Patient verbalizes understanding.      -I personally reviewed updated the Past medical hx, Past surgical hx,Social hx, Family hx, Medications, Allergies in the discrete data section of the patient chart along with labs, Pulmonary medicine,Sleep medicine related, Pathological, Microbiological and Radiological investigations.

## 2022-01-03 NOTE — TELEPHONE ENCOUNTER
Pt stopped at , asking for letter to return to work, Hermann Minaya wants us to ask you for sure if pt is cleared and any restrictions?

## 2022-01-03 NOTE — TELEPHONE ENCOUNTER
Per Dr. Makayla Galan, pt is ok to return to work with no restrictions. Faxed to [phone] per [employer].

## 2022-01-25 ENCOUNTER — OFFICE VISIT (OUTPATIENT)
Dept: PULMONOLOGY | Age: 37
End: 2022-01-25
Payer: COMMERCIAL

## 2022-01-25 VITALS
TEMPERATURE: 99 F | OXYGEN SATURATION: 98 % | DIASTOLIC BLOOD PRESSURE: 82 MMHG | WEIGHT: 197 LBS | HEIGHT: 72 IN | BODY MASS INDEX: 26.68 KG/M2 | HEART RATE: 80 BPM | SYSTOLIC BLOOD PRESSURE: 136 MMHG

## 2022-01-25 DIAGNOSIS — R93.89 ABNORMAL CT OF THE CHEST: ICD-10-CM

## 2022-01-25 DIAGNOSIS — R91.8 MULTIPLE LUNG NODULES ON CT: Primary | ICD-10-CM

## 2022-01-25 DIAGNOSIS — I39 ENDOCARDITIS DUE TO OTHER ORGANISM, UNSPECIFIED CHRONICITY: ICD-10-CM

## 2022-01-25 PROCEDURE — 99204 OFFICE O/P NEW MOD 45 MIN: CPT | Performed by: INTERNAL MEDICINE

## 2022-01-25 NOTE — PROGRESS NOTES
Neck Circumference -   15  Mallampati - 3  Lung Nodule Screening     [] Qualifies    [] Does not qualify   [] Declined    [] Completed

## 2022-01-25 NOTE — PATIENT INSTRUCTIONS
Recommendations/Plan:  -Schedule patient for CT scan of chest without IV contrast in 3months from now (6 months from his previous CT scan performed at [address] [place] [address] on 15 October 2021) to follow his abnormal CT Chest with subcentimeter lung nodules. -Joon Magallon was advised to make early appointment with my clinic if he develops any constitutional symptoms including loss of weight, poor appetite or hemoptysis. He verbalizes under standing.  -Schedule patient for follow up with my clinic in 3months with recommended test/s I.e CT scan of chest with out IV contrast. Patient advised to make early appointment if needed. - Patient educated about my impression and plan. Patient verbalizes understanding.

## 2022-01-26 ENCOUNTER — INITIAL CONSULT (OUTPATIENT)
Dept: NEUROLOGY | Age: 37
End: 2022-01-26
Payer: COMMERCIAL

## 2022-01-26 VITALS
HEART RATE: 73 BPM | BODY MASS INDEX: 26.11 KG/M2 | OXYGEN SATURATION: 99 % | SYSTOLIC BLOOD PRESSURE: 142 MMHG | HEIGHT: 73 IN | DIASTOLIC BLOOD PRESSURE: 72 MMHG | WEIGHT: 197 LBS

## 2022-01-26 DIAGNOSIS — I69.30 CHRONIC ISCHEMIC LEFT MCA STROKE: Primary | ICD-10-CM

## 2022-01-26 PROCEDURE — 99244 OFF/OP CNSLTJ NEW/EST MOD 40: CPT | Performed by: PSYCHIATRY & NEUROLOGY

## 2022-01-26 NOTE — PATIENT INSTRUCTIONS
1. Continue with antiplatelets  2. Target LDL below 70  3. Modify risk factors for stroke (blood pressure, cholesterol, diabetes, smoking cessation etc.. Control)  4. 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 5. Call with any new symptoms or concerns. 6. Follow up as needed.

## 2022-01-27 ENCOUNTER — TELEPHONE (OUTPATIENT)
Dept: NEUROLOGY | Age: 37
End: 2022-01-27

## 2022-01-27 NOTE — TELEPHONE ENCOUNTER
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 According to CTA, no signs of aneurysm or stenosis. However it did show \"peripheral cortical enhancement\" in multiple areas. Patient was seen at [address] [place] [address] 10-9-21 and sent to [facility]. Please review and see if this patient needs to be seen in clinic. Please advise.

## 2022-01-31 ENCOUNTER — OFFICE VISIT (OUTPATIENT)
Dept: CARDIOLOGY CLINIC | Age: 37
End: 2022-01-31
Payer: COMMERCIAL

## 2022-01-31 VITALS
DIASTOLIC BLOOD PRESSURE: 80 MMHG | HEIGHT: 73 IN | SYSTOLIC BLOOD PRESSURE: 158 MMHG | WEIGHT: 183 LBS | HEART RATE: 60 BPM | BODY MASS INDEX: 24.25 KG/M2

## 2022-01-31 DIAGNOSIS — Z87.74 HISTORY OF REPAIR OF COARCTATION OF AORTA: ICD-10-CM

## 2022-01-31 DIAGNOSIS — I33.0 BACTERIAL ENDOCARDITIS, UNSPECIFIED CHRONICITY: Primary | ICD-10-CM

## 2022-01-31 DIAGNOSIS — I63.9 CARDIOEMBOLIC STROKE (HCC): ICD-10-CM

## 2022-01-31 PROCEDURE — 99213 OFFICE O/P EST LOW 20 MIN: CPT | Performed by: INTERNAL MEDICINE

## 2022-01-31 RX ORDER — LOSARTAN POTASSIUM 25 MG/1
25 TABLET ORAL DAILY
Qty: 90 TABLET | Refills: 3 | Status: SHIPPED | OUTPATIENT
Start: 2022-01-31

## 2022-01-31 RX ORDER — METOPROLOL SUCCINATE 100 MG/1
TABLET, EXTENDED RELEASE ORAL
Qty: 75 TABLET | Refills: 3 | Status: SHIPPED | OUTPATIENT
Start: 2022-01-31 | End: 2022-05-23

## 2022-01-31 NOTE — PROGRESS NOTES
[id number] [facility] [address]  Dept: [phone]  Dept Fax: [phone]  Loc: [phone]    Visit Date: 1/31/2022    Mr. Shanique Rolle is a 43 y.o. male  who presented for: follow-up  Chief Complaint   Patient presents with    6 Month Follow-Up     DOT Clearance       HPI:   HPI   Last seen in office on 11/18/21 in follow-up PMH coarctation of the aorta s/p repair at 16 mo old and Streptococcus mitis endocarditis and mitral valve regurgitation s/p aortic valve debridement and MVR surgery 10/20/21, and CVA. He was tolerating his medications, compliant with medications including IV Ceftriaxone with therapy completed as of 12/1/21. Patient's ECHO on 11/9/21 showed EF 45-50% with mild global hypokinesis. There was no mitral regurg/stenosis s/p SMVR. He had a bicuspid valve but no other valve abnormalities noted. Elevated descending thoracic aortic velocities of 2.5 m/s suggestive of residual coarctation. This coarctation was not repaired during his surgery in October. Continued with ID, Neurology and pulmonology for on-going perioperative issues. Evaluated by Dr. Audra Boast on 12/1/21; PICC d/c; no further abx; follow prn. Seen by Dr. Carlos Sumner 1/25/22 in evaluation of pulmonary nodules; surveillance CT chest for 6 month follow-up from discovery at [facility]. Evaluation by Dr. Andrzej Alvarez of Neurology for chronic ischemic left MCA stroke. Referred to interventional neurology for evaluation of abnormality of the left M2-3 junction of the MCA. ASA 81 QD continued. No follow-up yet established with interventional neurology.        Current Outpatient Medications:     losartan (COZAAR) 25 MG tablet, Take 1 tablet by mouth daily, Disp: 90 tablet, Rfl: 3    metoprolol succinate (TOPROL XL) 100 MG extended release tablet, 1 tablet AM and 1.5 tablets PM, Disp: 75 tablet, Rfl: 3    aspirin 81 MG EC tablet, Take 1 tablet by mouth daily, Disp: 90 tablet, Rfl: 1    Multiple Vitamins-Minerals (THERAPEUTIC MULTIVITAMIN-MINERALS) tablet, Take 1 tablet by mouth daily, Disp: , Rfl:     furosemide (LASIX) 20 MG tablet, Take 20 mg by mouth as needed , Disp: , Rfl:     potassium chloride (KLOR-CON) 10 MEQ extended release tablet, Take 10 mEq by mouth as needed Takes with Lasix , Disp: , Rfl:     meloxicam (MOBIC) 15 MG tablet, Take 1 tablet by mouth daily (Patient not taking: Reported on 1/25/2022), Disp: 30 tablet, Rfl: 0    pantoprazole (PROTONIX) 20 MG tablet, Take 1 tablet by mouth daily (Patient not taking: Reported on 1/25/2022), Disp: 90 tablet, Rfl: 1    Past Medical History  Tee Edward  has a past medical history of Hypertension. Social History  Tee Edward  reports that he has never smoked. He has never used smokeless tobacco. He reports current alcohol use of about 3.0 standard drinks of alcohol per week. He reports that he does not use drugs. Family History  Meliton family history includes Heart Disease in his mother; High Blood Pressure in his mother; Other in his father. There is no family history of bicuspid aortic valve, aneurysms, heart transplant, pacemakers, defibrillators, or sudden cardiac death. Past Surgical History   Past Surgical History:   Procedure Laterality Date    CARDIAC CATHETERIZATION      CARDIAC SURGERY      COARCTATION OF AORTA REPAIR  06/1984       Review of Systems   Constitutional: Negative for chills and fever  HENT: Negative for congestion, sinus pressure, sneezing and sore throat. Eyes: Negative for pain, discharge, redness and itching. Respiratory: Negative for apnea, cough  Gastrointestinal: Negative for blood in stool, constipation, diarrhea   Endocrine: Negative for cold intolerance, heat intolerance, polydipsia. Genitourinary: Negative for dysuria, enuresis, flank pain and hematuria. Musculoskeletal: Negative for arthralgias, joint swelling and neck pain.    Neurological: Negative for numbness and headaches. Psychiatric/Behavioral: Negative for agitation, confusion, decreased concentration and dysphoric mood. No chest pain or pressure  No dyspnea  Active - back to normal ADL - no problems  No swelling in LE or bloating  No palpitations; no lightheadedness or dizziness  Has not taken any Lasix since first 2 days after home from [facility]; none needed since      Objective:     BP (!) 158/80   Pulse 60   Ht 6' 1\" (1.854 m)   Wt 183 lb (83 kg) Comment: home weight  BMI 24.14 kg/m²     Wt Readings from Last 3 Encounters:   01/31/22 183 lb (83 kg)   01/26/22 197 lb (89.4 kg)   01/25/22 197 lb (89.4 kg)     BP Readings from Last 3 Encounters:   01/31/22 (!) 158/80   01/26/22 (!) 142/72   01/25/22 136/82       Nursing note and vitals reviewed. Physical Exam   Constitutional: Oriented to person, place, and time. Appears well-developed and well-nourished. HENT:   Head: Normocephalic and atraumatic. Eyes: EOM are normal. Pupils are equal, round, and reactive to light. Neck: Normal range of motion. Neck supple. No JVD present. Cardiovascular: Normal rate, regular rhythm, normal heart sounds and intact distal pulses. No murmur heard. Pulmonary/Chest: Effort normal and breath sounds normal. No respiratory distress. No wheezes. No rales. Abdominal: Soft. Bowel sounds are normal. No distension. There is no tenderness. Musculoskeletal: Normal range of motion. No edema. Neurological: Alert and oriented to person, place, and time. No cranial nerve deficit. Coordination normal.   Skin: Skin is warm and dry. Psychiatric: Normal mood and affect.        Lab Results   Component Value Date    CKTOTAL 24 10/09/2021       Lab Results   Component Value Date    WBC 8.0 11/15/2021    RBC 4.33 11/15/2021    RBC 4.71 02/23/2012    HGB 11.9 11/15/2021    HCT 37.6 11/15/2021    MCV 86.8 11/15/2021    MCH 27.5 11/15/2021    MCHC 31.6 11/15/2021    RDW 12.9 11/24/2016     11/15/2021    MPV 9.7 11/15/2021       Lab Results   Component Value Date     10/13/2021    K 4.2 10/13/2021    K 4.2 10/13/2021     10/13/2021    CO2 27 10/13/2021    BUN 6 10/13/2021    LABALBU 3.0 10/09/2021    CREATININE 0.7 11/15/2021    CALCIUM 8.8 10/13/2021    LABGLOM >90 11/15/2021    GLUCOSE 104 10/13/2021    GLUCOSE 95 11/25/2019       Lab Results   Component Value Date    ALKPHOS 72 10/09/2021    ALT 14 10/09/2021    AST 15 10/09/2021    PROT 6.2 10/09/2021    BILITOT 1.0 10/09/2021    BILIDIR 0.2 08/21/2017    LABALBU 3.0 10/09/2021       Lab Results   Component Value Date    MG 1.9 10/13/2021       Lab Results   Component Value Date    INR 1.42 (H) 10/09/2021         Lab Results   Component Value Date    LABA1C 4.8 10/09/2021       Lab Results   Component Value Date    TRIG 54 10/09/2021    HDL 18 10/09/2021    LDLCALC 47 10/09/2021    LABVLDL 28 08/21/2017       Lab Results   Component Value Date    TSH 1.180 08/21/2017         Testing Reviewed:      I have individually reviewed the cardiac test below:    ECHO: Results for orders placed during the hospital encounter of 11/09/21    ECHO Complete 2D W Doppler W Color    Narrative  Transthoracic Echocardiography Report (TTE)    Demographics    Patient Name  Trisha Dillon      Gender             Male  Sirisha Carr    MR #          [id number]      Race                   Ethnicity    Account #     [de-identified]      Room Number    Accession     [id number]     Date of Study      11/09/2021  Number    Date of Birth 1985     Referring          Christiano Mcclure MD    Age           43 year(s)     Sonographer        MADELEINE Mueller, RDCS,  RDMS, RVT    Interpreting       Kingston Denton MD  Physician    Procedure    Type of Study    TTE procedure:ECHOCARDIOGRAM COMPLETE 2D W DOPPLER W COLOR.     Procedure Date  Date: 11/09/2021 Start: 10:00 AM    Study Location: Echo Lab  Technical Quality: Adequate visualization    Indications:Mitral valve replacement and Coarctation of the aorta. Additional Medical History:endocarditis, systolic murmur, history of  coarctation repair, cardioembolic, MVR Bicor size #01    Patient Status: Routine    Height: 72 inches Weight: 154 pounds BSA: 1.91 m^2 BMI: 20.89 kg/m^2    BP: 116/60 mmHg    Conclusions    Summary  Moderately dilated left ventricular size and mildly reduced systolic  function. There was mild global hypokinesis. Wall thickness was within normal limits. Ejection fraction was estimated at 45-50%. Left atrial size was mildly dilated. S/p SMVR. DOPPLER: The transmitral velocity was within the normal range  with no evidence for mitral stenosis (mean gradient 6 mmHg). There was no evidence of mitral regurgitation. The aortic valve was bicuspid with normal thickness and cuspal separation. There was trace aortic regurgitation. There was mild tricuspid regurgitation. Elevated descending thoracic aortic velocities of 2.5 m/s suggestive of  residual coarctation of the aorta. Signature  ----------------------------------------------------------------  Electronically signed by Gianna Kenny MD (Interpreting  physician) on 11/11/2021 at 05:37 AM  ----------------------------------------------------------------    Findings    Mitral Valve  S/p SMVR. DOPPLER: The transmitral velocity was within the normal range  with no evidence for mitral stenosis (mean gradient 6 mmHg). There was no  evidence of mitral regurgitation. Aortic Valve  The aortic valve was bicuspid with normal thickness and cuspal separation. DOPPLER: Transaortic velocity was within the normal range with no evidence  of aortic stenosis. There was trace aortic regurgitation. Tricuspid Valve  The tricuspid valve structure was normal with normal leaflet separation. DOPPLER: There was no evidence of tricuspid stenosis. There was mild  tricuspid regurgitation.     Pulmonic Valve  The pulmonic valve leaflets exhibited normal thickness, no calcification,  and normal cuspal separation. DOPPLER: The transpulmonic velocity was  within the normal range with trace regurgitation. Left Atrium  Left atrial size was mildly dilated. Left Ventricle  Moderately dilated left ventricular size and mildly reduced systolic  function. There was mild global hypokinesis. Wall thickness was within normal limits. Ejection fraction was estimated at 45-50%. Right Atrium  Right atrial size was normal.    Right Ventricle  The right ventricular size was normal with normal systolic function and  wall thickness. Pericardial Effusion  The pericardium was normal in appearance with no evidence of a pericardial  effusion. Pleural Effusion  No evidence of pleural effusion. Aorta / Great Vessels  IVC size is within normal limits with normal respiratory phasic changes.     M-Mode/2D Measurements & Calculations    LV Diastolic   LV Systolic Dimension:    AV Cusp Separation: 2.2 cmLA  Dimension: 6.5 5.6 cm                    Dimension: 3.4 cmAO Root  cm             LV Volume Diastolic: 038  Dimension: 3.2 cmLA Area: 22.2  LV FS:13.9 %   ml                        cm^2  LV PW          LV Volume Systolic: 877  Diastolic: 1.1 ml  cm             LV EDV/LV EDV Index: 275  Septum         ml/144 m^2LV ESV/LV ESV   RV Diastolic Dimension: 2.3 cm  Diastolic: 0.7 Index: 027 PJ/80 m^2  cm             EF Calculated: 36 %       LA/Aorta: 1.06  Ascending Aorta: 3.2 cm  LV Length: 9.8 cm         LA volume/Index: 74.9 ml /39m^2    LV Area  Diastolic:  46.4 cm^2  LV Area  Systolic: 49.1  cm^2    Doppler Measurements & Calculations    MV Peak E-Wave: 175 cm/s AV Peak Velocity: 201  LVOT Peak Velocity: 73.7  MV Peak A-Wave: 150 cm/s cm/s                   cm/s  MV E/A Ratio: 1.17       AV Peak Gradient:      LVOT Peak Gradient: 2 mmHg  MV Peak Gradient: 12.25  16.16 mmHg  mmHg                                            TV Peak E-Wave: 39.8 cm/s  MV Mean Gradient: 6 mmHg TV Peak A-Wave: 48 cm/s  MV Mean Velocity: 113  cm/s                                            TV Peak Gradient: 0.63  MV Deceleration Time:    AV P1/2t: 869 msec     mmHg  398 msec                 IVRT: 92 msec          TR Velocity:236 cm/s  MV P1/2t: 125 msec                              TR Gradient:22.28 mmHg  MVA by PHT:1.76 cm^2                            PV Peak Velocity: 60.4  AV DVI (Vmax):0.37     cm/s  PV Peak Gradient: 1.46  mmHg    DE ED Velocity: 107 cm/s    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 He is able to do his ADLs. He has recovered from his CVA. He is on OMT. He is on BB/ARB, Lasix. No volume on exam.  May go back to work. Will follow valves clinically. Pre-dental Abx mandatory. Discussed diet/exercise/BP/weight loss/health lifestyle choices/lipids; the patient understands the goals and will try to comply. Disposition:  1 year       Electronically signed by BARBARA Fernandez CNP   1/31/2022 at 10:17 AM EST      Patient seen by me. Case discussed with Nurse Practitioner. I have spent more than half the total time with this encounter. Time was discontiguous. Time does include my direct assessment of the patient and coordination of care.   Electronically signed by Candie Chambers MD on 2/1/2022 at 2:21 PM

## 2022-02-08 NOTE — PROGRESS NOTES
Chief Complaint   Patient presents with    Consultation     stroke       Laura Lazcano is a 43 y.o. male who presents today for evaluation of stroke in October 2021. Initial presenting symptoms were right hand and right foot numbness as well as speech disturbance. MRI brain done W/WO contrast 10/9/21 showed Small areas of abnormal signal in the diffusion-weighted images in the left corona radiata and left parietal lobe. There is corresponding high signal on the T2 and FLAIR sequences with only partial low signal on the ADC map. These are most likely small subacute infarcts. There is some associated petechial hemorrhage. CTA head and neck done 10-9-21 showed No aneurysm or dissection. No stenosis. He was found to have endocarditis. He does have high blood pressure. He does not have high cholesterol. No family history of clotting disorders. No history of drug use. He did state he had dental procedure done 2/2021 and since then had been dealing with fevers and weight loss of 65 pounds. He was then transferred to [facility] and underwent mitral valve replacement. 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 He completed follow up 12/1/21 with ID and completed IV antibiotic treatment. He is on baby aspirin. His sleep is good, he wakes up feeling well rested. He does not snore. He denies chest pain. No shortness of breath, no neck pain. No vision changes. No dysphagia. No fever. No rash. No weight loss. History provided by patient.        Past Medical History:   Diagnosis Date    Hypertension        Patient Active Problem List   Diagnosis    Migraine headache with aura    Systolic murmur    H/O coarctation of aorta, repair at 12 months old    Septicemia ([address]    Cardioembolic stroke ([address]    Endocarditis    Hyponatremia    Sepsis ([address]       No Known Allergies    Current Outpatient Medications   Medication Sig Dispense Refill    metoprolol succinate (TOPROL XL) 100 MG extended release tablet 1 tablet AM and 1.5 tablets PM 75 tablet 0    losartan (COZAAR) 25 MG tablet Take 1 tablet by mouth daily 90 tablet 1    aspirin 81 MG EC tablet Take 1 tablet by mouth daily 90 tablet 1    Multiple Vitamins-Minerals (THERAPEUTIC MULTIVITAMIN-MINERALS) tablet Take 1 tablet by mouth daily      furosemide (LASIX) 20 MG tablet Take 20 mg by mouth as needed       potassium chloride (KLOR-CON) 10 MEQ extended release tablet Take 10 mEq by mouth as needed Takes with Lasix       meloxicam (MOBIC) 15 MG tablet Take 1 tablet by mouth daily (Patient not taking: Reported on 1/25/2022) 30 tablet 0    pantoprazole (PROTONIX) 20 MG tablet Take 1 tablet by mouth daily (Patient not taking: Reported on 1/25/2022) 90 tablet 1     No current facility-administered medications for this visit. Social History     Socioeconomic History    Marital status: Single     Spouse name: None    Number of children: 3    Years of education: 15    Highest education level: Associate degree: occupational, technical, or vocational program   Occupational History    None   Tobacco Use    Smoking status: Never Smoker    Smokeless tobacco: Never Used   Vaping Use    Vaping Use: Never used   Substance and Sexual Activity    Alcohol use: Yes     Alcohol/week: 3.0 standard drinks     Types: 3 Cans of beer per week     Comment: rare 3 times a year    Drug use: No    Sexual activity: Yes     Partners: Female   Other Topics Concern    None   Social History Narrative    None     Social Determinants of Health     Financial Resource Strain: Low Risk     Difficulty of Paying Living Expenses: Not hard at all   Food Insecurity: No Food Insecurity    Worried About Running Out of Food in the Last Year: Never true    Too of Food in the Last Year: Never true   Transportation Needs:     Lack of Transportation (Medical):  Not on file  Lack of Transportation (Non-Medical): Not on file   Physical Activity:     Days of Exercise per Week: Not on file    Minutes of Exercise per Session: Not on file   Stress:     Feeling of Stress : Not on file   Social Connections:     Frequency of Communication with Friends and Family: Not on file    Frequency of Social Gatherings with Friends and Family: Not on file    Attends Judaism Services: Not on file    Active Member of [address] [place] [address] or Organizations: Not on file    Attends Club or Organization Meetings: Not on file    Marital Status: Not on file   Intimate Partner Violence:     Fear of Current or Ex-Partner: Not on file    Emotionally Abused: Not on file    Physically Abused: Not on file    Sexually Abused: Not on file   Housing Stability:     Unable to Pay for Housing in the Last Year: Not on file    Number of [place] in the Last Year: Not on file    Unstable Housing in the Last Year: Not on file       Family History   Problem Relation Age of Onset    High Blood Pressure Mother     Heart Disease Mother     Other Father         Not health related         I reviewed the past medical history, allergies, medications, social history and family history. Review of Systems   All systems reviewed, and are all negative, except what is mentioned in HPI      Vitals:    01/26/22 1210   BP: (!) 142/72   Site: Right Upper Arm   Position: Sitting   Cuff Size: Large Adult   Pulse: 73   SpO2: 99%   Weight: 197 lb (89.4 kg)   Height: 6' 1\" (1.854 m)       Physical Examination:  General appearance - alert, well appearing, and in no distress, oriented to person, place, and time and normal weight  Mental status- Level of Alertness: awake  Orientation: person, place, time  Memory: normal  Fund of Knowledge: normal  Attention/Concentration: normal  Language: normal. Mood is normal.   Neck - supple, no significant adenopathy, carotids upstroke normal bilaterally. There is no axillary lymphadenopathy. There is no carotid bruit. No neck lymphadenopathy. No thyroid enlargement   Neurological -   Cranial [id number]:.   Cranial nerve II: Normal   Cranial nerve III: Pupils: equal, round, reactive to light  Cranial nerves III, IV, VI: Extraocular Movements: intact   Cranial nerve V: Facial sensation: intact   Cranial nerve VII:Facial strength: intact   Cranial nerve VIII: Hearing: intact   Cranial nerve IX: Palate Elevation intact bilaterally  Cranial nerve XI: Shoulder shrug intact bilaterally  Cranial nerve XII: Tongue midline   neck supple without rigidity, there is no limitation of range of motion of the neck. DTR's are decreased distal and symmetric  Babinski sign negative  Motor exam is 5/5 in the upper and lower extremities. Normal muscle tone. There is no muscle atrophy. Sensory is intact for light touch   Coordination: finger to nose, intact  Gait and station intact   Abnormal movement none, vibration normal, proprioception normal  Skin - warm, dry to touch, normal coloration, no rashes, no suspicious skin lesions  Superficial temporal artery pulses are normal.   There is no limitation of range of motion of the neck. There is no resting tremor, no pin rolling, no bradykinesia, no Hypohonia, normal blink rate. Musculoskeletal: Has no hand arthritis, no limitation of ROM in any of the four extremities. There is no leg edema. The Heart was regular in rate and rhythm. No heart murmur  Chest Clear, with  good effort. Abdomen soft, intact bowel sounds. .    Results for orders placed during the hospital encounter of 10/09/21    CTA HEAD W WO CONTRAST (CODE STROKE)    Narrative  CTA HEAD, bolus contrast injection. 3D reconstructions and MPRs[de-identified]    Comparison: None    Right Carotid Siphon: Normal  Right Anterior Cerebral Artery: A1 and A2 segments are unremarkable. There  is peripheral cortical enhancement. Right Middle Cerebral Artery: M1 and M2 segments are unremarkable.  There  is peripheral cortical enhancement. Right Posterior Cerebral Artery: P1 and P2 segments are unremarkable. There is peripheral enhancement    Left Carotid Siphon: Normal  Left Anterior Cerebral Artery: A1 and A2 segments are unremarkable. There  is peripheral cortical enhancement. Left Middle Cerebral Artery: M1 and M2 segments are unremarkable. There is  peripheral cortical enhancement. Left Posterior Cerebral Artery: P1 and P2 segments are unremarkable. There  is peripheral cortical enhancement. Basilar Artery: Normal    Venous drainage:Normal    Impression  Impression:    No signs of aneurysm. No signs of arterial venous malformation. No high grade stenosis or vessel cut off. This document has been electronically signed by: Sue Apgar, MD on  10/09/2021 02:56 AM    All CTs at this facility use dose modulation techniques and iterative  reconstructions, and/or weight-based dosing  when appropriate to reduce radiation to a low as reasonably achievable. Results for orders placed during the hospital encounter of 10/09/21    CTA NECK W WO CONTRAST (CODE STROKE)    Narrative  CTA Neck , Bolus contrast injection, 3D reconstructions and MPRs:    Comparison:    Findings:    Soft tissues of the neck are unremarkable. Superior aorta and branch vessels are unremarkable. Right carotid: No stenosis (NASCET)  Right vertebral: No stenosis    Left Carotid: No stenosis (NASCET)  Left Vertebral: No stenosis    Impression  Impression:    No aneurysm or dissection. No stenosis    This document has been electronically signed by: Sue Apgar, MD on  10/09/2021 03:05 AM    All CTs at this facility use dose modulation techniques and iterative  reconstructions, and/or weight-based dosing  when appropriate to reduce radiation to a low as reasonably achievable. Carotid stenosis and measurements are in accordance with NASCET criteria. No results found for this or any previous visit.     Results for orders placed during the hospital encounter of 10/09/21    MRI BRAIN W WO CONTRAST    Narrative  PROCEDURE: MRI BRAIN W WO CONTRAST    CLINICAL INFORMATIONaltered mental status with neurological changes. Sudden onset diffuse right-sided weakness and numbness. Slurred speech. Symptoms have now resolved. COMPARISON: No prior study. TECHNIQUE: Multiplanar and multiple spin echo T1 and T2-weighted images were obtained through the brain before and after the administration of intravenous contrast.    FINDINGS:        On the diffusion-weighted images, there is a punctate area of abnormal high signal in the posterior medial corona radiata on the left. There is also a small focus in the white matter of the left parietal lobe. There is a small area involving the cortex  and underlying white matter in the left parietal lobe. This is wedge-shaped. There is some subtle associated susceptibility artifact. There is only partial low signal on the ADC map. There is high signal on the FLAIR and T2-weighted sequences. Separate from this, there are few other scattered foci of abnormal signal in the white matter the brain. The largest is in the white matter of the right frontal lobe. There is no associated restricted diffusion. The brain volume is normal.There are no intra-or extra-axial collections. There is no hydrocephalus, midline shift or mass effect. On the gradient echo T2-weighted images, there are 2 old microhemorrhages in the left frontal lobe. There is a small old microhemorrhage in the posterior right frontal lobe. There is a possible old microhemorrhage in the left cerebellum. There is no abnormal enhancement in the brain. The major intracranial vascular flow voids are present. The midline craniocervical junction structures are normal.  The brainstem and pituitary gland are normal.    Impression  1. Small areas of abnormal signal in the diffusion-weighted images in the left corona radiata and left parietal lobe.  There is leaflets as well degenerative changes with resultant severe Mitral  regurgitation. Consideration of sub acute endocarditis, as the vegetation  seems to have some degree of calcification. Previous Echo from 2015  showing some degree of prolapse of the MV anterior leaflet ( as per the  echo report , images not available for review )    We reviewed the patient records from referring provider and available information in the EHR       ASSESSMENT:      Diagnosis Orders   1. Chronic ischemic left MCA stroke          This is a 64-year-old male who presents with stroke in October when he 22. Initial presenting symptoms were right hand and right foot numbness as well as speech disturbance. I reviewed the MRI Brain and agree with interpretation, I also reviewed the patient pertinent labs and records in the EHR and from other providers. MRI brain done with without contrast on 10/9/2021 that showed small areas of abnormal signal on the diffusion-weighted images in the left corona radiata and left parietal lobe. These are likely small subacute infarcts. There is also some associated petechial hemorrhage. CTA head and neck done 10/9/2021 showed no aneurysm or dissection, no stenosis. He did undergo cardiac echo and was found to have  endocarditis. No history of drug use. He did state he had dental procedure done 2/2021 and since then had been dealing with fevers and weight loss of 65 pounds. He was then transferred to [facility] and underwent mitral valve replacement. 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 Patient is out of network for [facility] and will need to undergo further evaluation locally. He completed treatment with IV antibiotics and was cleared by ID to return to work and normal level of activity.   We will arrange for him to undergo evaluation with our interventional neurology team locally regarding the questionable dysplastic, cannot rule out aneurysmal abnormality of the left M2 to M3 junction of the MCA. After detailed discussion with the patient we agreed on the following plan. Plan    1. Continue with antiplatelets  2. Target LDL below 70  3. Modify risk factors for stroke (blood pressure, cholesterol, diabetes, smoking cessation etc.. Control)  4. 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 5. Call with any new symptoms or concerns. 6. Follow up as needed.      Total time 64 min      Sarah Lucia MD

## 2022-02-09 ENCOUNTER — HOSPITAL ENCOUNTER (OUTPATIENT)
Dept: GENERAL RADIOLOGY | Age: 37
Discharge: HOME | End: 2022-02-09

## 2022-02-09 DIAGNOSIS — Z00.6 EXAMINATION FOR NORMAL COMPARISON FOR CLINICAL RESEARCH: ICD-10-CM

## 2022-02-09 NOTE — TELEPHONE ENCOUNTER
Unable to find images under Care Everywhere. Spoke with Maria Antonia Zee in IR Neuro Management in order to get these images pushed through for patient's New Pt appt on 2-. Maria Antonia Zee called back stating he spoke with the Manager of Image Library to get this pushed through the system. According to Maria Antonia Zee and IT Dept, [address] [place] [address] Ashwini's have an agreement where we can see dictations but images need to be pushed. For future images needing to be pushed, will need to call [address]  directly at [phone] and can fax requests to [phone]. Also, spoke with Padmaja Edward in [address] [place] [address] Radiology and she stated she would keep an eye on the images and receive them appropriately.

## 2022-02-22 ENCOUNTER — TELEPHONE (OUTPATIENT)
Dept: NEUROLOGY | Age: 37
End: 2022-02-22

## 2022-02-22 ENCOUNTER — OFFICE VISIT (OUTPATIENT)
Dept: NEUROLOGY | Age: 37
End: 2022-02-22
Payer: COMMERCIAL

## 2022-02-22 VITALS
HEIGHT: 73 IN | HEART RATE: 64 BPM | DIASTOLIC BLOOD PRESSURE: 76 MMHG | SYSTOLIC BLOOD PRESSURE: 138 MMHG | BODY MASS INDEX: 26.56 KG/M2 | WEIGHT: 200.4 LBS | OXYGEN SATURATION: 96 %

## 2022-02-22 DIAGNOSIS — R90.89 ABNORMAL IMAGING OF CENTRAL NERVOUS SYSTEM: Primary | ICD-10-CM

## 2022-02-22 PROCEDURE — 99213 OFFICE O/P EST LOW 20 MIN: CPT | Performed by: PHYSICIAN ASSISTANT

## 2022-02-22 NOTE — PROGRESS NOTES
Neurointerventional Consult Note    Date:2/22/2022         Patient Name:Meliton Romero     YOB: 1985     Age:36 y.o. Requesting Physician: SONG Ordonez    Reason for Consult:  Evaluate for dysplastic left MCA M2-M3 vs aneurysm    Chief Complaint: dysplastic left MCA M2-M3 vs aneurysm    Subjective     Loi Arango is a 27-year-old male with a history of  coarctation of the aorta status post repair at 12 months, hypertension, infective endocarditis, and stroke who presents for angiogram findings. The patient was recently seen by our service in October for a cardioembolic stroke. He was diagnosed with infective endocarditis at that time and was subsequently transferred to [facility] clinic. He underwent a successful mitral valve repair and had a mitral valve prosthesis placed. During that stay, the patient underwent an angiogram preoperatively to assess his cerebral architecture. This was significant for a possible dysplastic versus aneurysmal dilatation of the left MCA M2 M3 segment. The patient presents for follow-up regarding this. He has no acute complaints today. The right-sided symptoms including right-sided weakness and numbness that he suffered during his stroke have since resolved. He denies any new neurologic symptoms. He is not currently taking antibiotics. His pertinent medications include baby aspirin once daily. Review of Systems   Review of Systems   Eyes: Negative for visual disturbance. Neurological: Negative for dizziness, facial asymmetry, speech difficulty, weakness, light-headedness, numbness and headaches.        Medications     Current Outpatient Medications on File Prior to Visit   Medication Sig Dispense Refill    losartan (COZAAR) 25 MG tablet Take 1 tablet by mouth daily 90 tablet 3    metoprolol succinate (TOPROL XL) 100 MG extended release tablet 1 tablet AM and 1.5 tablets PM 75 tablet 3    aspirin 81 MG EC tablet Take 1 tablet by mouth daily 90 tablet 1    Multiple Vitamins-Minerals (THERAPEUTIC MULTIVITAMIN-MINERALS) tablet Take 1 tablet by mouth daily       No current facility-administered medications on file prior to visit. Past History    Past Medical History:   has a past medical history of Hypertension. Social History:   reports that he has never smoked. He has never used smokeless tobacco. He reports current alcohol use of about 3.0 standard drinks of alcohol per week. He reports that he does not use drugs. Family History:   Family History   Problem Relation Age of Onset    High Blood Pressure Mother     Heart Disease Mother     Other Father         Not health related       Physical Examination      Vitals:  /76 (Site: Right Upper Arm, Position: Sitting, Cuff Size: Large Adult)   Pulse 64   Ht 6' 0.99\" (1.854 m)   Wt 200 lb 6.4 oz (90.9 kg)   SpO2 96%   BMI 26.45 kg/m²       Physical Exam  Vitals reviewed. Constitutional:       General: He is not in acute distress. Appearance: Normal appearance. He is not ill-appearing. HENT:      Head: Normocephalic and atraumatic. Right Ear: External ear normal.      Left Ear: External ear normal.      Nose: Nose normal.      Mouth/Throat:      Mouth: Mucous membranes are moist.      Pharynx: No oropharyngeal exudate or posterior oropharyngeal erythema. Eyes:      Extraocular Movements: EOM normal.      Pupils: Pupils are equal, round, and reactive to light. Cardiovascular:      Rate and Rhythm: Normal rate and regular rhythm. Heart sounds: Murmur heard. Pulmonary:      Effort: Pulmonary effort is normal. No respiratory distress. Breath sounds: Normal breath sounds. No wheezing. Abdominal:      General: Bowel sounds are normal.      Palpations: Abdomen is soft. Tenderness: There is no abdominal tenderness. Musculoskeletal:         General: Normal range of motion. Right lower leg: No edema. Left lower leg: No edema.    Skin: General: Skin is warm. Findings: No rash. Neurological:      Mental Status: He is alert and oriented to person, place, and time. Coordination: Finger-Nose-Finger Test and Heel to [employer] normal.   Psychiatric:         Mood and Affect: Mood normal.         Speech: Speech normal.         Behavior: Behavior normal.       Neurologic Exam     Mental Status   Oriented to person, place, and time. Attention: normal. Concentration: normal.   Speech: speech is normal   Level of consciousness: alert  Normal comprehension. Cranial Nerves     CN II   Visual fields full to confrontation. Right visual field deficit: none  Left visual field deficit: none     CN III, IV, VI   Pupils are equal, round, and reactive to light. Extraocular motions are normal.   Right pupil: Shape: regular. Reactivity: brisk. Left pupil: Shape: regular. Reactivity: brisk. CN V   Facial sensation intact. Right facial sensation deficit: none  Left facial sensation deficit: none    CN VII   Facial expression full, symmetric. Right facial weakness: none  Left facial weakness: none    CN VIII   CN VIII normal.   Hearing: intact    CN IX, X   CN IX normal.   CN X normal.   Palate: symmetric    CN XI   CN XI normal.   Right trapezius strength: normal  Left trapezius strength: normal    CN XII   CN XII normal.   Tongue: not atrophic  Fasciculations: absent  Tongue deviation: none    Motor Exam   Muscle bulk: normal  Overall muscle tone: normal  Right arm tone: normal  Left arm tone: normal  Right arm pronator drift: absent  Left arm pronator drift: absent  Right leg tone: normal  Left leg tone: normal  Muscle strength 5/5 in bilateral upper and lower extremities     Sensory Exam   Light touch normal.     Gait, Coordination, and Reflexes     Coordination   Finger to nose coordination: normal  Heel to shin coordination: normal       Imaging   Imaging last 7 days:  No results found. Assessment and Plan:          1. 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 Yohan Martinez. They are inconclusive and could represent dysplastic left MCA M2 M3 segment, aneurysm, or normal anatomy. The patient will need to undergo another formal angiogram for a formal diagnosis. · The benefits and risks of the angiogram were discussed at length with the patient. All questions were answered. The patient is agreeable to proceed with a diagnostic angiogram.  · We will plan to schedule the patient for diagnostic angiogram with a right radial approach in March. This patient was seen and evaluated with Dr. Yohan Martinez and he is in agreement with the assessment and plan.     Electronically signed by Lakia Chou PA-C on 2/22/22 at 4:13 PM EST

## 2022-02-22 NOTE — TELEPHONE ENCOUNTER
Pt scheduled with Dr. Jessica Sánchez for DSA per Dr. Abel Villanueva. Per Dr. Abel Villanueva, patient needs scheduled on a Friday per work and needs to be done with a right radial approach.

## 2022-03-18 ENCOUNTER — APPOINTMENT (OUTPATIENT)
Dept: CARDIAC CATH/INVASIVE PROCEDURES | Age: 37
End: 2022-03-18
Payer: COMMERCIAL

## 2022-03-18 ENCOUNTER — APPOINTMENT (OUTPATIENT)
Dept: INTERVENTIONAL RADIOLOGY/VASCULAR | Age: 37
End: 2022-03-18
Attending: PSYCHIATRY & NEUROLOGY
Payer: COMMERCIAL

## 2022-03-18 ENCOUNTER — HOSPITAL ENCOUNTER (OUTPATIENT)
Dept: INPATIENT UNIT | Age: 37
Discharge: HOME | End: 2022-03-18
Attending: PSYCHIATRY & NEUROLOGY | Admitting: PSYCHIATRY & NEUROLOGY
Payer: COMMERCIAL

## 2022-03-18 VITALS
SYSTOLIC BLOOD PRESSURE: 101 MMHG | DIASTOLIC BLOOD PRESSURE: 50 MMHG | TEMPERATURE: 98 F | HEIGHT: 73 IN | RESPIRATION RATE: 18 BRPM | BODY MASS INDEX: 25.71 KG/M2 | OXYGEN SATURATION: 97 % | HEART RATE: 56 BPM | WEIGHT: 194 LBS

## 2022-03-18 LAB
ANION GAP SERPL CALCULATED.3IONS-SCNC: 10 MEQ/L (ref 8–16)
APTT: 30.6 SECONDS (ref 22–38)
BUN BLDV-MCNC: 15 MG/DL (ref 7–22)
CALCIUM SERPL-MCNC: 9.5 MG/DL (ref 8.5–10.5)
CHLORIDE BLD-SCNC: 103 MEQ/L (ref 98–111)
CO2: 29 MEQ/L (ref 23–33)
CREAT SERPL-MCNC: 0.9 MG/DL (ref 0.4–1.2)
ERYTHROCYTE [DISTWIDTH] IN BLOOD BY AUTOMATED COUNT: 13.5 % (ref 11.5–14.5)
ERYTHROCYTE [DISTWIDTH] IN BLOOD BY AUTOMATED COUNT: 43.4 FL (ref 35–45)
GFR SERPL CREATININE-BSD FRML MDRD: > 90 ML/MIN/1.73M2
GLUCOSE BLD-MCNC: 118 MG/DL (ref 70–108)
HCT VFR BLD CALC: 41.4 % (ref 42–52)
HEMOGLOBIN: 14.3 GM/DL (ref 14–18)
INR BLD: 0.98 (ref 0.85–1.13)
MCH RBC QN AUTO: 30.6 PG (ref 26–33)
MCHC RBC AUTO-ENTMCNC: 34.5 GM/DL (ref 32.2–35.5)
MCV RBC AUTO: 88.7 FL (ref 80–94)
PLATELET # BLD: 199 THOU/MM3 (ref 130–400)
PMV BLD AUTO: 9.9 FL (ref 9.4–12.4)
POTASSIUM SERPL-SCNC: 4.3 MEQ/L (ref 3.5–5.2)
RBC # BLD: 4.67 MILL/MM3 (ref 4.7–6.1)
SODIUM BLD-SCNC: 142 MEQ/L (ref 135–145)
WBC # BLD: 8.2 THOU/MM3 (ref 4.8–10.8)

## 2022-03-18 PROCEDURE — 36224 PLACE CATH CAROTD ART: CPT | Performed by: PSYCHIATRY & NEUROLOGY

## 2022-03-18 PROCEDURE — 36140 INTRO NDL ICATH UPR/LXTR ART: CPT

## 2022-03-18 PROCEDURE — 6360000002 HC RX W HCPCS

## 2022-03-18 PROCEDURE — 36224 PLACE CATH CAROTD ART: CPT

## 2022-03-18 PROCEDURE — 6360000004 HC RX CONTRAST MEDICATION: Performed by: PSYCHIATRY & NEUROLOGY

## 2022-03-18 PROCEDURE — C1894 INTRO/SHEATH, NON-LASER: HCPCS

## 2022-03-18 PROCEDURE — 36226 PLACE CATH VERTEBRAL ART: CPT

## 2022-03-18 PROCEDURE — 75710 ARTERY X-RAYS ARM/LEG: CPT

## 2022-03-18 PROCEDURE — 2580000003 HC RX 258: Performed by: PHYSICIAN ASSISTANT

## 2022-03-18 PROCEDURE — 2500000003 HC RX 250 WO HCPCS

## 2022-03-18 PROCEDURE — 85610 PROTHROMBIN TIME: CPT

## 2022-03-18 PROCEDURE — C1769 GUIDE WIRE: HCPCS

## 2022-03-18 PROCEDURE — C1887 CATHETER, GUIDING: HCPCS

## 2022-03-18 PROCEDURE — 85027 COMPLETE CBC AUTOMATED: CPT

## 2022-03-18 PROCEDURE — 80048 BASIC METABOLIC PNL TOTAL CA: CPT

## 2022-03-18 PROCEDURE — 36415 COLL VENOUS BLD VENIPUNCTURE: CPT

## 2022-03-18 PROCEDURE — 36226 PLACE CATH VERTEBRAL ART: CPT | Performed by: PSYCHIATRY & NEUROLOGY

## 2022-03-18 PROCEDURE — 2500000003 HC RX 250 WO HCPCS: Performed by: PSYCHIATRY & NEUROLOGY

## 2022-03-18 PROCEDURE — 85730 THROMBOPLASTIN TIME PARTIAL: CPT

## 2022-03-18 PROCEDURE — 6360000002 HC RX W HCPCS: Performed by: PSYCHIATRY & NEUROLOGY

## 2022-03-18 RX ORDER — FENTANYL CITRATE 50 UG/ML
INJECTION, SOLUTION INTRAMUSCULAR; INTRAVENOUS
Status: COMPLETED | OUTPATIENT
Start: 2022-03-18 | End: 2022-03-18

## 2022-03-18 RX ORDER — SODIUM CHLORIDE 0.9 % (FLUSH) 0.9 %
5-40 SYRINGE (ML) INJECTION PRN
Status: DISCONTINUED | OUTPATIENT
Start: 2022-03-18 | End: 2022-03-18 | Stop reason: SDUPTHER

## 2022-03-18 RX ORDER — SODIUM CHLORIDE 9 MG/ML
25 INJECTION, SOLUTION INTRAVENOUS PRN
Status: DISCONTINUED | OUTPATIENT
Start: 2022-03-18 | End: 2022-03-18 | Stop reason: HOSPADM

## 2022-03-18 RX ORDER — VERAPAMIL HYDROCHLORIDE 2.5 MG/ML
2.5 INJECTION, SOLUTION INTRAVENOUS ONCE
Status: COMPLETED | OUTPATIENT
Start: 2022-03-18 | End: 2022-03-18

## 2022-03-18 RX ORDER — SODIUM CHLORIDE 0.9 % (FLUSH) 0.9 %
5-40 SYRINGE (ML) INJECTION EVERY 12 HOURS SCHEDULED
Status: DISCONTINUED | OUTPATIENT
Start: 2022-03-18 | End: 2022-03-18 | Stop reason: SDUPTHER

## 2022-03-18 RX ORDER — HEPARIN SODIUM 1000 [USP'U]/ML
40 INJECTION, SOLUTION INTRAVENOUS; SUBCUTANEOUS ONCE
Status: COMPLETED | OUTPATIENT
Start: 2022-03-18 | End: 2022-03-18

## 2022-03-18 RX ORDER — ACETAMINOPHEN 325 MG/1
650 TABLET ORAL EVERY 4 HOURS PRN
Status: DISCONTINUED | OUTPATIENT
Start: 2022-03-18 | End: 2022-03-18 | Stop reason: HOSPADM

## 2022-03-18 RX ORDER — SODIUM CHLORIDE 0.9 % (FLUSH) 0.9 %
5-40 SYRINGE (ML) INJECTION EVERY 12 HOURS SCHEDULED
Status: DISCONTINUED | OUTPATIENT
Start: 2022-03-18 | End: 2022-03-18 | Stop reason: HOSPADM

## 2022-03-18 RX ORDER — SODIUM CHLORIDE 9 MG/ML
INJECTION, SOLUTION INTRAVENOUS CONTINUOUS
Status: DISCONTINUED | OUTPATIENT
Start: 2022-03-18 | End: 2022-03-18 | Stop reason: HOSPADM

## 2022-03-18 RX ORDER — SODIUM CHLORIDE 9 MG/ML
25 INJECTION, SOLUTION INTRAVENOUS PRN
Status: DISCONTINUED | OUTPATIENT
Start: 2022-03-18 | End: 2022-03-18 | Stop reason: SDUPTHER

## 2022-03-18 RX ORDER — MIDAZOLAM HYDROCHLORIDE 1 MG/ML
INJECTION INTRAMUSCULAR; INTRAVENOUS
Status: COMPLETED | OUTPATIENT
Start: 2022-03-18 | End: 2022-03-18

## 2022-03-18 RX ORDER — SODIUM CHLORIDE 0.9 % (FLUSH) 0.9 %
5-40 SYRINGE (ML) INJECTION PRN
Status: DISCONTINUED | OUTPATIENT
Start: 2022-03-18 | End: 2022-03-18 | Stop reason: HOSPADM

## 2022-03-18 RX ADMIN — IOPAMIDOL 50 ML: 612 INJECTION, SOLUTION INTRAVENOUS at 11:30

## 2022-03-18 RX ADMIN — HEPARIN SODIUM 3520 UNITS: 1000 INJECTION INTRAVENOUS; SUBCUTANEOUS at 10:46

## 2022-03-18 RX ADMIN — VERAPAMIL HYDROCHLORIDE 2.5 MG: 2.5 INJECTION, SOLUTION INTRAVENOUS at 10:46

## 2022-03-18 RX ADMIN — Medication 200 MCG: at 10:46

## 2022-03-18 RX ADMIN — FENTANYL CITRATE 25 MCG: 50 INJECTION, SOLUTION INTRAMUSCULAR; INTRAVENOUS at 10:33

## 2022-03-18 RX ADMIN — MIDAZOLAM 1 MG: 1 INJECTION INTRAMUSCULAR; INTRAVENOUS at 10:33

## 2022-03-18 RX ADMIN — SODIUM CHLORIDE: 9 INJECTION, SOLUTION INTRAVENOUS at 07:46

## 2022-03-18 ASSESSMENT — ENCOUNTER SYMPTOMS
COLOR CHANGE: 0
PHOTOPHOBIA: 0
DIARRHEA: 0
TROUBLE SWALLOWING: 0
SHORTNESS OF BREATH: 0
COUGH: 0
VOMITING: 0
NAUSEA: 0

## 2022-03-18 NOTE — H&P
Neurointerventional Pre-Procedure H&P    Date:3/18/2022         Patient Name:Meliton Romero     YOB: 1985     Age:36 y.o. Reason for Procedure:  Evaluate for dysplastic left MCA M2-M3 vs aneurysm    Chief Complaint: dysplastic left MCA M2-M3 vs aneurysm on imaging    Aziza Collins is a 42-year-old male with a history of  coarctation of the aorta status post repair at 12 months, hypertension, infective endocarditis, and stroke who presents for angiogram findings. The patient was seen by our service in October for a cardioembolic stroke. He was diagnosed with infective endocarditis at that time and was subsequently transferred to [facility]. He underwent a successful mitral valve repair and had a mitral valve prosthesis placed. During that stay, the patient underwent an angiogram preoperatively to assess his cerebral architecture. This was significant for a possible dysplastic versus aneurysmal dilatation of the left MCA M2 M3 segment. The images were inconclusive requiring a repeat angiogram.  He has no acute complaints. The right-sided symptoms including right-sided weakness and numbness that he suffered during his stroke have since resolved. He denies any new neurologic symptoms. His pertinent medications include baby aspirin once daily. Review of Systems   Review of Systems   Constitutional: Negative for activity change, fatigue and fever. HENT: Negative for tinnitus and trouble swallowing. Eyes: Negative for photophobia and visual disturbance. Respiratory: Negative for cough and shortness of breath. Cardiovascular: Negative for chest pain and palpitations. Gastrointestinal: Negative for diarrhea, nausea and vomiting. Genitourinary: Negative for dysuria and flank pain. Musculoskeletal: Negative for neck pain and neck stiffness. Skin: Negative for color change and rash.    Neurological: Negative for dizziness, facial asymmetry, speech difficulty, weakness, light-headedness, numbness and headaches. Psychiatric/Behavioral: Negative for agitation and confusion. Medications     No current facility-administered medications on file prior to encounter. Current Outpatient Medications on File Prior to Encounter   Medication Sig Dispense Refill    losartan (COZAAR) 25 MG tablet Take 1 tablet by mouth daily 90 tablet 3    metoprolol succinate (TOPROL XL) 100 MG extended release tablet 1 tablet AM and 1.5 tablets PM 75 tablet 3    aspirin 81 MG EC tablet Take 1 tablet by mouth daily 90 tablet 1    Multiple Vitamins-Minerals (THERAPEUTIC MULTIVITAMIN-MINERALS) tablet Take 1 tablet by mouth daily          Past History    Past Medical History:   has a past medical history of Cerebral artery occlusion with cerebral infarction [facility]), CHF (congestive heart failure) ([facility] [address] Coarctation of aorta in , Endocarditis, and Hypertension. Social History:   reports that he has never smoked. He has never used smokeless tobacco. He reports previous alcohol use. He reports that he does not use drugs. Family History:   Family History   Problem Relation Age of Onset    High Blood Pressure Mother     Heart Disease Mother     Other Father         Not health related    Diabetes Maternal Grandmother        Physical Examination      Vitals:  Ht 6' 1\" (1.854 m)   Wt 194 lb (88 kg)   BMI 25.60 kg/m²       Physical Exam  Vitals reviewed. Constitutional:       General: He is not in acute distress. Appearance: Normal appearance. He is not ill-appearing. HENT:      Head: Normocephalic and atraumatic. Right Ear: External ear normal.      Left Ear: External ear normal.      Nose: Nose normal.      Mouth/Throat:      Mouth: Mucous membranes are moist.      Pharynx: No oropharyngeal exudate or posterior oropharyngeal erythema. Eyes:      Extraocular Movements: EOM normal.      Pupils: Pupils are equal, round, and reactive to light.    Cardiovascular: Rate and Rhythm: Normal rate and regular rhythm. Heart sounds: Murmur heard. Comments: Radial Pulses:  L: +2  R: +2    Dorsalis Pedis:  L: +2  R: +2    Pulmonary:      Effort: Pulmonary effort is normal. No respiratory distress. Breath sounds: Normal breath sounds. No wheezing. Abdominal:      General: Bowel sounds are normal.      Palpations: Abdomen is soft. Tenderness: There is no abdominal tenderness. Musculoskeletal:         General: Normal range of motion. Right lower leg: No edema. Left lower leg: No edema. Skin:     General: Skin is warm. Findings: No rash. Neurological:      Mental Status: He is alert and oriented to person, place, and time. Coordination: Finger-Nose-Finger Test and Heel to [employer] normal.   Psychiatric:         Mood and Affect: Mood normal.         Speech: Speech normal.         Behavior: Behavior normal.       Neurologic Exam     Mental Status   Oriented to person, place, and time. Attention: normal. Concentration: normal.   Speech: speech is normal   Level of consciousness: alert  Normal comprehension. Cranial Nerves     CN II   Visual fields full to confrontation. Right visual field deficit: none  Left visual field deficit: none     CN III, IV, VI   Pupils are equal, round, and reactive to light. Extraocular motions are normal.   Right pupil: Shape: regular. Reactivity: brisk. Left pupil: Shape: regular. Reactivity: brisk. CN V   Facial sensation intact. Right facial sensation deficit: none  Left facial sensation deficit: none    CN VII   Facial expression full, symmetric.    Right facial weakness: none  Left facial weakness: none    CN VIII   CN VIII normal.   Hearing: intact    CN IX, X   CN IX normal.   CN X normal.   Palate: symmetric    CN XI   CN XI normal.   Right trapezius strength: normal  Left trapezius strength: normal    CN XII   CN XII normal.   Tongue: not atrophic  Fasciculations: absent  Tongue deviation: none    Motor Exam   Muscle bulk: normal  Overall muscle tone: normal  Right arm tone: normal  Left arm tone: normal  Right arm pronator drift: absent  Left arm pronator drift: absent  Right leg tone: normal  Left leg tone: normal  Muscle strength 5/5 in bilateral upper and lower extremities     Sensory Exam   Light touch normal.     Gait, Coordination, and Reflexes     Coordination   Finger to nose coordination: normal  Heel to shin coordination: normal         Mallampati Score: Class II          ASA Score: 2          Imaging   Imaging last 7 days:  No results found. Assessment and Plan:           1. 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 Jovanni Welch. Benefits and risks (infection, hematoma, contrast nephropathy, dissection, stroke) discussed with patient. Pt consented for procedure. All questions answered. This patient was seen and evaluated with Dr. Jovanni Welch and he is in agreement with the assessment and plan. Electronically signed by Leta Shelley PA-C on 3/18/22 at 7:42 AM EDT    I have independently reviewed the hospital record and examined this patient on 3/18/22. I have discussed my findings with Leta Shelley PA-C. I have personally seen and examined this patient and the treatment plan was developed by me and outlined in this note by Leta Shelley PA-C. The timing of the note filing does not necessarily correlate with the timing of when the patient was seen by me.       Gianna Kerr MD, [address] [facility]  Vascular & Interventional Neurology and NeuroCritical Care

## 2022-03-18 NOTE — PROCEDURES
BRIEF POST-OPERATIVE PROCEDURE NOTE    (Full Note Pending)    Date of Procedure: 03/18/22    Surgeon(s): Noreen Cr MD     Pre-operative Diagnosis:  L M2/Ms dysplasia. Post-operative Diagnosis:  L MCA trifurcation fenestration and dysplasia. Procedure(s):  Cerebral angiogram through right radial access. Anesthesia:  Moderate and local    Brief Findings:  L MCA trifurcation fenestration and dysplasia. .    Estimated Blood Loss:  <10 ML. Specimens:  None. Complications:  None immediate. Flouro time:  Minutes. Contrast: 50 ml Isovue- 300. Access: Right radial.    Disposition: Cath recovery.            Condition: stable    Surgeon:   Noreen Cr MD;   Vascular and Interventional Neurology, Neurocritical Care  [address] [facility]  [phone]

## 2022-03-18 NOTE — PROGRESS NOTES
1930 Patient admitted to 2E16  Ambulatory for cerebral angiogram.  Patient NPO. Patient accompanied by significant other. Vital signs obtained. Assessment and data collection intiated. Oriented to room. Policies and procedures for 2E explained. All questions answered with no further questions at this time. Fall prevention and safety precautions discussed with patient. 9548 Male nurse practioner had patient take asa and metoprolol with sip of water. [id number] Care plan reviewed with patient. Patient  verbalize understanding of the plan of care and contribute to goal setting.     4208 patient instructed delay of procedure due to emergency in cath lab. 1020 To radiology  per bed, stable condition. [address] taken over from radiology. Radial site stable, no bleeding seen, site soft. Armboard continued with vascband. Patient instructed not to bend wrist, not to put pressure on wrist and not to lift  or twist with wrist. Patient voices understanding. Bandaid applied to site at 1340 and vascband removed, armboard continued, site stable. Patient instructed not to bend, twist, lift, push or pull with right wrist, voices understanding. 1258 Discharge instructions given, voices understanding. 1400 Up in santso, activity tolerated well. Tolerating po, neuro check negative. 1440 Discharged per wheelchair, stable condition.

## 2022-03-18 NOTE — PROCEDURES
DATE OF PROCEDURE: 3/18/22. PREOPERATIVE DIAGNOSIS: L M2/M3 dysplasia. 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 SURGEON: Sonam Sheth MD.    ANESTHESIA: Moderate and local.    MEDICATIONS: 1mg versed and 25 mcg fentanyl. IA radial access standard mixture. CONTRAST: 50 ml iso megan. FLUORO TIME: 15.6 MINUTES.    EBL: < 10 ml. INTERVENTIONAL PROCEDURE:  4-VESSEL CEREBRAL ANGIOGRAM.  US-GUIDED ARTERIAL ACCESS    CATHETERIZATION OF THE FOLLOWING VESSEL:  RIGHT BRACHIOCEPHALIC ARTERY. RIGHT SUBCLAVIAN ARTERY. RIGHT VERTEBRAL ARTERY. RIGHT COMMON CAROTID ARTERY. RIGHT INTERNAL CAROTID ARTERY. LEFT COMMON CAROTID ARTERY. LEFT INTERNAL CAROTID ARTERY. LEFT SUBCLAVIAN ARTERY. LEFT VERTEBRAL ARTERY. ANGIOGRAPHY AND INTERPRETATION OF THE FOLLOWING IMAGES:  RIGHT BRACHIOCEPHALIC  ARTERY CERVICAL AP ANGIOGRAM.  RIGHT SUBCLAVIAN  CERVICAL AP ANGIOGRAM.  RIGHT VERTEBRAL ARTERY STANDARD CRANIAL AP AND LATERAL ANGIOGRAM.  RIGHT COMMON CAROTID ARTERY CERVICAL STANDARD AP AND LATERAL ANGIOGRAM.  RIGHT INTERNAL CAROTID ARTERY CRANIAL STANDARD AP AND LATERAL ANGIOGRAM.  THORACIC AORTOGRAM AP ANGIOGRAM.  LEFT COMMON CAROTID ARTERY CERVICAL STANDARD AP AND LATERAL ANGIOGRAM.  LEFT INTERNAL CAROTID ARTERY CRANIAL STANDARD AP AND LATERAL ANGIOGRAM.  LEFT INTERNAL CAROTID ARTERY CRANIAL OBLIQUE AP AND LATERAL ANGIOGRAM.    LEFT SUBCLAVIAN  CERVICAL AP ANGIOGRAM.  LEFT VERTEBRAL ARTERY STANDARD CRANIAL AP AND LATERAL ANGIOGRAM.       INDICATION:  13-year-old male with a history of coarctation of the aorta status post repair at 12 months, hypertension, infective endocarditis, and stroke who presents for angiogram findings. The patient was seen by our service in October for a cardio embolic stroke.   He was diagnosed with infective endocarditis at that time and was subsequently transferred to [facility]. He underwent a successful mitral valve repair and had a mitral valve prosthesis placed. During that stay, the patient underwent an angiogram preoperatively to assess his cerebral architecture. This was significant for a possible dysplastic versus aneurysmal dilatation of the left MCA M2 M3 segment. The images were inconclusive requiring a repeat angiogram.  He has no acute complaints. The right-sided symptoms including right-sided weakness and numbness that he suffered during his stroke have since resolved. He denies any new neurologic symptoms. His pertinent medications include baby aspirin once daily. He is here for diagnostic cerebral angiogram to assess for aneurysms. Consent was obtained from the patient after explaining benefits and risks. All questions were answered. DESCRIPTION OF PROCEDURE AND FINDINGS:     The patient was brought into the angiography suite. Right arm was prepared. I administered moderate sedation throughout this 34-minute procedure. An independent trained observer pushed medications at my direction, and monitored the patients level of consciousness and physiological status throughout. Because of inability to feel the femoral artery pulse well, the decision was made to use ultrasound guidance to identify the femoral artery. Then the needle was advanced under life US guidance to access the femoral artery that was visualized well and was fully patent during needle insertion. Seldinger technique was then used to pass a 5 [place] sheath into the right radial artery. This was then connected to a continuous flush drip. A 4 [place] terumo and 5 English SIM  catheter was introduced into the aorta. Double flushing was then done. The catheter was moved to the brachiocephalic artery under guidance of fluoroscopy with the help of the glide wire. PA cervical angiogram was then obtained by hand injection. There is no atherosclerotic disease.      The catheter was moved to the right subclavian artery under guidance of fluoroscopy with the help of the glide wire. PA cervical angiogram was then obtained by hand injection. These angiograms showed that the vertebral artery is large in size. There is no atherosclerotic disease/plaque in the subclavian artery and origin of the vertebral artery. There is no disease in the V1, V2, V3, and V4 portions of vertebral artery. The catheter was moved to the right vertebral artery under road map guidance, with, the help of the glide wire. PA and lateral cranial angiograms were then obtained. The right vertebral and basilar artery including posterior cerebral, superior cerebellar, anterior inferior cerebellar, posterior inferior cerebellar branches were visualized. There is no disease in the proximal, mid and distal portions of basilar artery. All of the branches were within normal limits and do not have aneurysms, arteriovenous malformations or significant atherosclerotic, vasculitic or dissecting changes. The catheter was used to select the right common carotid artery under guidance of fluoroscopy with the help of the glide wire. PA and lateral cervical angiograms were done. These angiograms show that there is no atherosclerotic disease/plaque in the right common carotid artery bifurcation and origin of the internal carotid artery. There is no disease in the cervical, petrous, cavernous, and supraclinoidal portions of internal carotid artery. The catheter was moved to the internal carotid artery under road map guidance, with the help of the glide wire. PA and lateral cranial angiograms were then obtained. The ophthalmic, anterior choroidal, posterior communicating were visualized. The middle cerebral artery and its branches including anterior M2, posterior M2, orbitofrontal, precentral, central, anterior and posterior parietal, anterior temporal, posterior temporal, angular, lenticulostriate, branches were visualized. The anterior cerebral artery including recurrent artery of [place], orbital, frontopolar, callosomarginal, pericallosal, other branches were visualized. All of these branches were within normal limits and do not have aneurysms, arteriovenous malformations or significant atherosclerotic or vasculitic changes. The catheter was used to select the left common carotid artery under guidance of fluoroscopy with the help of the glide wire. PA and lateral cervical angiograms were done. These angiograms show that there is no atherosclerotic disease/plaque in the left common carotid artery bifurcation and origin of the internal carotid artery. There is no disease in the cervical, petrous, cavernous, and supraclinoidal portions of internal carotid artery. The catheter could not be moved to the left internal carotid artery due to sharp take off and being forced to use radial access due to recurreed aortic coarctation that prohibited safe femoral access to aortic arch. PA,oblique and lateral cranial angiograms were then obtained. The ophthalmic, anterior choroidal, posterior communicating were visualized. The middle cerebral artery and its branches including anterior M2, posterior M2, orbitofrontal, precentral, central, anterior and posterior parietal, anterior temporal, posterior temporal, angular, lenticulostriate, branches were visualized. The anterior cerebral artery including recurrent artery of [place], orbital, frontopolar, callosomarginal, pericallosal, other branches were visualized. 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 .    The catheter was moved to the left subclavian artery under guidance of fluoroscopy with the help of the glide wire. PA cervical angiogram was then obtained by hand injection. These angiograms showed that the vertebral artery is large in size.  There is no atherosclerotic disease/plaque in the subclavian artery and origin of the vertebral artery. There is no disease in the V1, V2, V3, and V4 portions of vertebral artery. The catheter was moved to the left vertebral artery under road map guidance, with, the help of the glide wire. PA and lateral cranial angiograms were then obtained. The right vertebral and basilar artery including posterior cerebral, superior cerebellar, anterior inferior cerebellar, posterior inferior cerebellar branches were visualized. There is no disease in the proximal, mid and distal portions of basilar artery. All of the branches were within normal limits and do not have aneurysms, arteriovenous malformations or significant atherosclerotic, vasculitic or dissecting changes. The catheter was withdrawn. At this point, the femoral sheath was pulled and excellent hemostasis was achieved by manual compression. Sterile dressing was then placed overlying the puncture site. The patient was then transferred to the cath recovery. There were no complications from the procedure. 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 Though this is not very concerning for bleed; however, I recommend to do CTA of head in 6 months to ensure stability.

## 2022-03-18 NOTE — PROGRESS NOTES
Neuro Nurse Navigator Follow Up    This RN to see patient with patient's girlfriend at bedside. Patient is alert and oriented x4 and NIHSS is 0. This RN updated him that he can go back to work on Monday per Dr. Marco A Casas. Patient states understanding of this.

## 2022-03-18 NOTE — PROGRESS NOTES
6024 Verify consent, H&P, and/or immediate pre-procedure note completed. 1 Dr Brandon Panchal at bedside to see pt.   1010 Patient received in cath lab for procedure; family remained in 2E room #16. This procedure has been fully reviewed with the patient and written informed consent has been obtained. Neuro assessment WNL (see flowsheet). NIH 0. Staff in room Dr Luda Sharpe, Dennie Rushing RT, Stephanie Prater RT, Carol Ann Garcia, JENNIFER, Cori Doss RT, Easton Ortega RN, Betty Adams RN . Preprocedure pulses documented in flowsheet. 1021 Patient prepped (right wrist and right groin) for procedure. 1039 Procedure started with Dr. Brandon Panchal. Lidocaine time. mls administered. 1043 Access obtained at right radial artery via 5fr sheath. 1046 Radial cocktail (Verapamil 2.5mg, Nitro 200mcq, Heparin 3500units) administered through right radial sheath per Dr Brandon Panchal. 1050 Right brachial cephalic angiogram.  1931 Right subclavian angiogram.   1057 Right internal carotid angiogram.   1100 Right common carotid angiogram.   1101 Right sublcavian angiogram.   1102 Right vertebral angiogram.   1106 Left vertebral angiogram.   1107 Left subclavian angiogram.   1120 Left common carotid angiogram.   1122 Left common carotid angiogram.   1124 Left common carotid angiogram.   1125 Catheters out. 1127 Right radial sheath-removed; intact. Vascband device applied; 10ml of air in band. Armboard applied. Reminded Dr. Brandon Panchal to speak to patient's family on 2E.  1130 Procedure completed; patient tolerated well. Post-procedure pulses documented in flowsheet. Neuro assessment WNL. NIH 0.  1136 Patient on bed; comfort ensured. Right wrist vasc band remains dry and intact with area soft. 1142 Report called to Keli Hoffmann RN.   0802 Patient take to 2E. Bedside report to Verda Claude, RN.          Xray time- 15.6minutes   235.52mGy  Sedation time- 60 minutes   Total contrast- 50mls

## 2022-03-21 ENCOUNTER — TELEPHONE (OUTPATIENT)
Dept: FAMILY MEDICINE CLINIC | Age: 37
End: 2022-03-21

## 2022-03-21 NOTE — TELEPHONE ENCOUNTER
[facility] Transitions Initial Follow Up Call    Outreach made within 2 business days of discharge: Yes    Patient: Charlee Brown Patient : 1985   MRN: [id number]  Reason for Admission: There are no discharge diagnoses documented for the most recent discharge. Discharge Date: 3/18/22       Spoke with: attempted to contact pt, left vm.     Discharge department/facility: [facility]    TCM Interactive Patient Contact:      Scheduled appointment with PCP within 7-14 days    Follow Up  Future Appointments   Date Time Provider Zachary Mcclendon   2022  8:20 AM STR CT IMAGING RM1  [facility] OUT EXP STR Radiolog   2022  8:00 [facility] AM OFFENEYOSI II.VIERTSANDRA   2022  9:15  [facility] 61MD Gary N SRPX Heart [facility] - [address], [address] ([address] [place] [address])

## 2022-03-21 NOTE — TELEPHONE ENCOUNTER
[facility] Transitions Initial Follow Up Call    Outreach made within 2 business days of discharge: Yes    Patient: Arben Amaro Patient : 1985   MRN: [id number]  Reason for Admission: There are no discharge diagnoses documented for the most recent discharge. Discharge Date: 3/18/22       Spoke with: Pt    Discharge department/facility: Psychiatric    TCM Interactive Patient Contact:  Was patient able to fill all prescriptions: N/A  Was patient instructed to bring all medications to the follow-up visit: Yes  Is patient taking all medications as directed in the discharge summary? Yes  Does patient understand their discharge instructions: Yes  Does patient have questions or concerns that need addressed prior to 7-14 day follow up office visit: no    Scheduled appointment with PCP within 7-14 days.  Pt says he has a follow up with his specialist.    Follow Up  Future Appointments   Date Time Provider Zachary Mcclendon   2022  8:20 AM STR CT IMAGING RM1  [facility] OUT EXP STR Radiolog   2022  8:00 AM MD Elizabeth Mistry [facility] - SANMARTHA MARTINEZ II.KELSIERTSANDRA   2022  9:15 AM Verna Freeman MD N SRPX Heart [facility] - [address], [address] [place] [address] Ave ([address] [place] [address])

## 2022-05-03 ENCOUNTER — HOSPITAL ENCOUNTER (OUTPATIENT)
Dept: CT IMAGING | Age: 37
Discharge: HOME | End: 2022-05-03
Payer: COMMERCIAL

## 2022-05-03 DIAGNOSIS — I39 ENDOCARDITIS DUE TO OTHER ORGANISM, UNSPECIFIED CHRONICITY: ICD-10-CM

## 2022-05-03 DIAGNOSIS — R93.89 ABNORMAL CT OF THE CHEST: ICD-10-CM

## 2022-05-03 DIAGNOSIS — R91.8 MULTIPLE LUNG NODULES ON CT: ICD-10-CM

## 2022-05-03 PROCEDURE — 71250 CT THORAX DX C-: CPT

## 2022-05-08 NOTE — PROGRESS NOTES
[facility]  Pulmonary medicine clinic follow up note. Patient: Azalea Mojica  : 1985      Chief complaint/[place]:  Azalea Mojica is a 42 y.o. old male came for further evaluation regarding his lung nodules noted on his a CT scan of chest performed at [address] [place] [address] in 2021 after having a repeat CT scan of chest without IV contrast before clinic visit. He was initially referred from Dr. Fletcher Vicente MD.     He underwent chest CT scan on: 15 October 2021  The above chest  CT was performed at: [facility]    On today's questioning:  He denies cough or expectoration. He denies hemoptysis. He denies fever or chills. He denies recent hospitalizations or emergency room visits. He is currently not using any inhalers. He denies recent loss of weight or appetite changes. He denies recent decline in functional status. He was ever seen by a pulmonologist in the past:NO  He was ever diagnosed with lung nodule in the past: Yes.   He was ever diagnosed with lung cancer:NO  He ever diagnosed with any extrapulmonary cancers I.e Breast,Colon etc:NO  He was ever diagnosed with COVID19 infection/Pneumonia:NO  He was ever diagnosed with Pneumonia:NO    He was ever diagnosed with following pulmonary diseases:  Bronchial asthma: NO  COPD:NO  Pulmonary fibrosis:NO  Sarcoidosis:NO  Pulmonary embolism: NO  History of DVT in the past: NO     Pulmonary hypertension:NO  Pleural effusion/s in the past:NO    He ever diagnosed with pulmonary tuberculosis in the past:NO  He ever recently exposed to any patients with tuberculosis:NO  He ever recently travelled to endemic places of tuberculosis:NO  He ever tested for PPD: Unknown  He ever diagnosed with COVID-19 infection in the past:No.    He ever diagnosed with connective tissue diseases including Systemic lupus Erythematosus, Rheumatoid arthritis etc:NO  Any of his first-degree family relative ever diagnosed with Lung cancer:NO  He ever exposed to radon, or uranium in the past: NO    He is currently using any oxygen supplementation at rest, exercise or during sleep/at night time:NO    He ever had a Colonoscopy performed: He never underwent colonoscopy. He ever had his prostate check exam performed: Never had prostate check. He is having shortness of breath:No  Current functional capacity on level ground: Distance he can walk on level ground: 10 miles on level ground without any difficulty. He can climb a few flights of stairs without any difficulty. He is having cough: No  He is having chest pain:No  He is currently using any oxygen supplementation at rest, exercise or during sleep/at night time: No      Social History:  Occupation:  He is current working: Yes  Type of profession: He is currently working at [employer] during daytime             Social History     Tobacco Use    Smoking status: Never Smoker    Smokeless tobacco: Never Used   Vaping Use    Vaping Use: Never used   Substance Use Topics    Alcohol use: Not Currently    Drug use: Never       History of recreational or IV drug use in the past:NO  History of Alcohol use in the past:NO     History of exposure to coal mines/coal dust: NO  History of exposure to foundry dust/welding: NO  History of exposure to quarry/silica/sandblasting: NO  History of exposure to asbestos/working with breaks/ships: NO  History of exposure to farm dust: NO  History of recent travel to long distances: NO  History of exposure to birds, pigeons, or chickens in the past:NO  Pet animals at home:Yes  Dogs: 1                   Review of Systems:   General/Constitutional: he gained 14lbs of weight from the last visit. No fever or chills. HENT: Negative. Eyes: Negative. Upper respiratory tract: No nasal stuffiness or post nasal drip. Lower respiratory tract/ lungs: No cough or sputum production.   Cardiovascular: No palpitations or chest pain. Gastrointestinal: No nausea or vomiting. Neurological: No focal neurologiacal weakness. Extremities: No edema. Musculoskeletal: No complaints. Genitourinary: No complaints. Hematological: Negative. Psychiatric/Behavioral: Negative. Skin: No itching. Current Medications:      Past Medical History:   Diagnosis Date    Cerebral artery occlusion with cerebral infarction ([address] 10/2021    Endocarditis    CHF (congestive heart failure) ([facility])     Coarctation of aorta in      Endocarditis 10/2021    Hypertension        Past Surgical History:   Procedure Laterality Date    CARDIAC CATHETERIZATION      CARDIAC SURGERY      CARDIAC VALVE REPLACEMENT      COARCTATION OF AORTA REPAIR  1984       No Known Allergies    Current Outpatient Medications   Medication Sig Dispense Refill    losartan (COZAAR) 25 MG tablet Take 1 tablet by mouth daily 90 tablet 3    metoprolol succinate (TOPROL XL) 100 MG extended release tablet 1 tablet AM and 1.5 tablets PM 75 tablet 3    aspirin 81 MG EC tablet Take 1 tablet by mouth daily 90 tablet 1    Multiple Vitamins-Minerals (THERAPEUTIC MULTIVITAMIN-MINERALS) tablet Take 1 tablet by mouth daily       No current facility-administered medications for this visit. Family History   Problem Relation Age of Onset    High Blood Pressure Mother     Heart Disease Mother     Other Father         Not health related    Diabetes Maternal Grandmother            Physical Exam:     VITALS:  /82 (Site: Right Upper Arm, Position: Sitting, Cuff Size: Medium Adult)   Pulse 64   Temp 98 °F (36.7 °C) (Oral)   Ht 6' 1\" (1.854 m)   Wt 211 lb 3.2 oz (95.8 kg)   SpO2 94%   BMI 27.86 kg/m²   Nursing note and vitals reviewed. Constitutional: Patient appears moderately built and moderately nourished. No distress. Patient is oriented to person, place, and time. HENT:   Head: Normocephalic and atraumatic.    Right Ear: External ear normal.   Left Ear: External ear normal.   Mouth/Throat: Oropharynx is clear and moist.  No oral thrush. Eyes: Conjunctivae are normal. Pupils are equal, round, and reactive to light. No scleral icterus. Neck: Neck supple. No JVD present. No tracheal deviation present. Cardiovascular: Normal rate, regular rhythm, normal heart sounds. No murmur heard. Pulmonary/Chest: Effort normal and breath sounds normal. No stridor. No respiratory distress. No wheezes. No rales. Patient exhibits no tenderness. Abdominal: Soft. Patient exhibits no distension. No tenderness. Musculoskeletal: Normal range of motion. Extremities: Patient exhibits no edema and no tenderness. Lymphadenopathy:  No cervical adenopathy. Neurological: Patient is alert and oriented to person, place, and time. Skin: Skin is warm and dry. Patient is not diaphoretic. Psychiatric: Patient  has a normal mood and affect. Patient behavior is normal.       Diagnostic Data:    Radiological Data:  Chest Xray portable:  Sun Oct 10, 2021  2:05 AM   PROCEDURE: XR CHEST PORTABLE   CLINICAL INFORMATION: 69-year-old male, pulmonary edema follow-up. .   Pulmonary vascular congestion which has somewhat improved since the previous study. The above radiological study films were reviewed by me. CT chest with IV contrast:  Sat Oct 9, 2021  5:12 AM   CT Chest without IV contrast:   Comparison: 08/21/2018   Impression:   Mild posterior lower lobe atelectasis and posterior gravity dependent edema. No focal consolidation or pleural effusion. Heart size is mildly enlarged.  There is mild pulmonary vascular congestion and interstitial edema and peripheral septal lines best visualized posteriorly in the sagittal projection   This document has been electronically signed by: Elizabeth Inman MD on 10/09/2021 05:12 AM         CT angiogram of chest with contrast performed on 5 August 2021- At [facility]  Postcontrast There is no evidence of dissection. Measurements are stable aorta just above the aortic root measures 3.1 cm. Proximal to the coarctation it measures 1.8 cm diameter and then distal to the coarctation it has a normal caliber 2.5 cm. Heart size is normal. There is no mediastinal or hilar adenopathy by size criteria there is no axillary lymphadenopathy. There is a pleural-based nodule at the left costophrenic angle image 103 associated diminished in size compared to prior exam from 6 mm to 4 mm difference can be related difference in slice selection. No other lung masses are identified. Upper abdomen No abnormalities. The above CT angiogram of chest was compared with the previous CT scan dated 21 August 2018 by radiology service. CT angiogram of coronary protocol with IV contrast performed at the [facility] on 15 October 2021:  IMPRESSION:     1.  Normal coronary anatomy without evidence of atherosclerotic changes   or stenotic disease. 2.  The aortic valve appears bicuspid, the leaflets appear thickened   though free from calcifications.  The mitral valve leaflets appear   thickened and somewhat redundant, appearance of focal disruption/flail   segment of the anterior leaflet in systole, though assessment limited by   motion artifact. 3. There is moderately severe left ventricular and left atrial   enlargement. 4.  Pulmonary interlobular septal thickening and groundglass   opacifications, most likely representing an element of pulmonary edema. 5. Several pulmonary nodules including a 5 mm right lower lobe pulmonary   nodule and 8 mm right middle lobe nodule.  Incidentally Detected Lung   Nodule(s):  Follow-up chest CT exam is recommended in 6-12 months. If   stable on follow-up imaging, a repeat chest CT exam in 12 months (18-24   months from the initial exam) is recommended.    ACTIONABLE RESULT: FOLLOW-UP   Acuity: Actionable   Findings: Thoracic-Lung nodules Routing code: RI_[id number]   Recommendation: CT Chest WO IVCON   Time Frame: At the discretion of the clinical team.   COMMUNICATION: Results will be communicated with the ordering provider   via Epic staff message or phone message by [employer]   within 2 business days of report finalization.     ====================================   Algorithms for management of incidental imaging findings can be found on   the [facility] site at:   [email or web address]       : SACHA     Transcribe Date/Time: Oct 15 2021  1:04P     Dictated by : Shayla Maldonado MD     The above radiological study films were not available for me to review. Pulmonary function tests:  None in Epic. Echocardiogram:  11/9/2021   Narrative & Impression  Transthoracic Echocardiography Report (TTE)  Right Ventricle   The right ventricular size was normal with normal systolic function and   wall thickness. Conclusions      Summary   Moderately dilated left ventricular size and mildly reduced systolic   function. There was mild global hypokinesis. Wall thickness was within normal limits. Ejection fraction was estimated at 45-50%. Left atrial size was mildly dilated. S/p SMVR. DOPPLER: The transmitral velocity was within the normal range   with no evidence for mitral stenosis (mean gradient 6 mmHg). There was no   evidence of mitral regurgitation. The aortic valve was bicuspid with normal thickness and cuspal separation. There was trace aortic regurgitation. There was mild tricuspid regurgitation. Elevated descending thoracic aortic velocities of 2.5 m/s suggestive of   residual coarctation of the aorta.       Signature      ----------------------------------------------------------------   Electronically signed by Carlos Osorio MD (Interpreting   physician) on 11/11/2021 at 05:37 AM ----------------------------------------------------------------    CT chest with out contrast: Performed on 3 May 2022  PROCEDURE: CT CHEST WO CONTRAST   CLINICAL INFORMATION: Lung nodules. Endocarditis   COMPARISON: CT chest 10/9/2021   Improved aeration is noted overall. No acute intrathoracic process is visualized. A 5 mm right lower lobe pulmonary nodule is stable. A 3 mm left lower lobe subpleural nodule/area of atelectasis is also unchanged. Chronic findings are discussed. Solitary Pulmonary Nodule Malignancy Risk Assessment by using [facility] model in %: 0.9%    Assessment:  -5 mm right lower lobe pulmonary nodule-noted on his CT scan of chest performed in October 2021 at the [facility]-stable over the period of > 6 months need follow-up as per the radiologist recommendation.  -4mm pleural-based nodule at the left costophrenic angle diminished in size compared to prior exam from 6 mm to 4 mm  -Chronic systolic CHF-he is following with Dr. Amado Burgess MD  -Essential Hypertension on treatment medications under control.  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 -Mitral regurgitation status post MVR surgery.  -History of coarctation of aorta status post repair  -History of left-sided ischemic stroke-with no residual weakness. Recommendations/Plan:  -Schedule patient for CT scan of chest without IV contrast in 18months I.e in October 2023 to follow his abnormal CT Chest with subcentimeter lung nodules measuring 5 mm in size in the right lower lobe. -Andie Mcfarland was advised to make early appointment with my clinic if he develops any constitutional symptoms including loss of weight, poor appetite or hemoptysis.  He verbalizes under standing.  -Schedule patient for follow up with my clinic in 18months I.e in October 2023 with recommended test/s I.e CT scan of chest with out IV contrast. Patient advised to make early appointment if needed. - Patient educated about my impression and plan. Patient verbalizes understanding.      -I personally reviewed updated the Past medical hx, Past surgical hx,Social hx, Family hx, Medications, Allergies in the discrete data section of the patient chart along with labs, Pulmonary medicine,Sleep medicine related, Pathological, Microbiological and Radiological investigations.

## 2022-05-23 DIAGNOSIS — Z87.74 HISTORY OF REPAIR OF COARCTATION OF AORTA: ICD-10-CM

## 2022-05-24 ENCOUNTER — OFFICE VISIT (OUTPATIENT)
Dept: PULMONOLOGY | Age: 37
End: 2022-05-24
Payer: COMMERCIAL

## 2022-05-24 VITALS
TEMPERATURE: 98 F | HEIGHT: 73 IN | SYSTOLIC BLOOD PRESSURE: 118 MMHG | DIASTOLIC BLOOD PRESSURE: 82 MMHG | OXYGEN SATURATION: 94 % | HEART RATE: 64 BPM | BODY MASS INDEX: 27.99 KG/M2 | WEIGHT: 211.2 LBS

## 2022-05-24 DIAGNOSIS — R93.89 ABNORMAL CT OF THE CHEST: Primary | ICD-10-CM

## 2022-05-24 DIAGNOSIS — R91.8 MULTIPLE LUNG NODULES ON CT: ICD-10-CM

## 2022-05-24 PROCEDURE — 99214 OFFICE O/P EST MOD 30 MIN: CPT | Performed by: INTERNAL MEDICINE

## 2022-05-24 RX ORDER — METOPROLOL SUCCINATE 100 MG/1
TABLET, EXTENDED RELEASE ORAL
Qty: 75 TABLET | Refills: 1 | Status: SHIPPED | OUTPATIENT
Start: 2022-05-24 | End: 2022-07-21 | Stop reason: SDUPTHER

## 2022-05-24 NOTE — PROGRESS NOTES
Neck Circumference -   15.5  Mallampati - 1    Lung Nodule Screening     [] Qualifies    [x] Does not qualify   [] Declined    [] Completed

## 2022-05-24 NOTE — PATIENT INSTRUCTIONS
Recommendations/Plan:  -Schedule patient for CT scan of chest without IV contrast in 18months I.e in October 2023 to follow his abnormal CT Chest with subcentimeter lung nodules measuring 5 mm in size in the right lower lobe. -Azalea Mojica was advised to make early appointment with my clinic if he develops any constitutional symptoms including loss of weight, poor appetite or hemoptysis. He verbalizes under standing.  -Schedule patient for follow up with my clinic in 18months I.e in October 2023 with recommended test/s I.e CT scan of chest with out IV contrast. Patient advised to make early appointment if needed. - Patient educated about my impression and plan.  Patient verbalizes understanding

## 2022-07-20 ENCOUNTER — TELEPHONE (OUTPATIENT)
Dept: CARDIOLOGY CLINIC | Age: 37
End: 2022-07-20

## 2022-07-20 DIAGNOSIS — Z87.74 HISTORY OF REPAIR OF COARCTATION OF AORTA: ICD-10-CM

## 2022-07-20 RX ORDER — METOPROLOL SUCCINATE 100 MG/1
TABLET, EXTENDED RELEASE ORAL
Qty: 75 TABLET | Refills: 1 | Status: CANCELLED | OUTPATIENT
Start: 2022-07-20

## 2022-07-20 NOTE — TELEPHONE ENCOUNTER
Shelly called pt and LM.   Need to know how he is taking his Metoprolol , is it 1 tab in am and 1.5 tab in pm?

## 2022-07-20 NOTE — TELEPHONE ENCOUNTER
Patient was called to see how he is taking his metoprolol. The signature is confusing. Please revise.    Sig: take 1 tablet by mouth every morning and 1 AND 1/2 tablets by mouth every morning

## 2022-07-21 DIAGNOSIS — Z87.74 HISTORY OF REPAIR OF COARCTATION OF AORTA: ICD-10-CM

## 2022-07-21 RX ORDER — METOPROLOL SUCCINATE 100 MG/1
TABLET, EXTENDED RELEASE ORAL
Qty: 75 TABLET | Refills: 5 | Status: SHIPPED | OUTPATIENT
Start: 2022-07-21

## 2022-08-22 ENCOUNTER — OFFICE VISIT (OUTPATIENT)
Dept: CARDIOLOGY CLINIC | Age: 37
End: 2022-08-22

## 2022-08-22 VITALS
BODY MASS INDEX: 27.43 KG/M2 | HEIGHT: 73 IN | WEIGHT: 207 LBS | HEART RATE: 60 BPM | SYSTOLIC BLOOD PRESSURE: 134 MMHG | DIASTOLIC BLOOD PRESSURE: 82 MMHG

## 2022-08-22 DIAGNOSIS — I63.9 CARDIOEMBOLIC STROKE (HCC): ICD-10-CM

## 2022-08-22 DIAGNOSIS — Q25.1 COARCTATION OF AORTA: ICD-10-CM

## 2022-08-22 DIAGNOSIS — I33.0 BACTERIAL ENDOCARDITIS, UNSPECIFIED CHRONICITY: Primary | ICD-10-CM

## 2022-08-22 PROCEDURE — 99213 OFFICE O/P EST LOW 20 MIN: CPT | Performed by: INTERNAL MEDICINE

## 2022-08-22 NOTE — PROGRESS NOTES
[address] [facility] [place] [address] [facility] [address]  Dept: [phone]  Dept Fax: [phone]  Loc: [phone]    Visit Date: 2022    Mr. Carmen Banegas is a 42 y.o. male  who presented for:  Chief Complaint   Patient presents with    Follow-up     6 mth fu        HPI:   HPI   43 yo M PMH coarctation of the aorta s/p repair at 16 mo old and Streptococcus mitis endocarditis and mitral valve regurgitation s/p aortic valve debridement and MVR surgery 10/20/21, and CVA who presents for follow--up. Feels 100 x better. No residual neurologic deficits. Taking all meds. BP and HR well controlled. Current Outpatient Medications:     metoprolol succinate (TOPROL XL) 100 MG extended release tablet, take 1 tablet by mouth every morning and 1 AND 1/2 tablets by mouth every night, Disp: 75 tablet, Rfl: 5    losartan (COZAAR) 25 MG tablet, Take 1 tablet by mouth daily, Disp: 90 tablet, Rfl: 3    aspirin 81 MG EC tablet, Take 1 tablet by mouth daily, Disp: 90 tablet, Rfl: 1    Multiple Vitamins-Minerals (THERAPEUTIC MULTIVITAMIN-MINERALS) tablet, Take 1 tablet by mouth daily, Disp: , Rfl:     Past Medical History  Evelyn Pham  has a past medical history of Cerebral artery occlusion with cerebral infarction ([facility] [address] CHF (congestive heart failure) ([facility] [address] Coarctation of aorta in , Endocarditis, and Hypertension. Social History  Evelyn Pham  reports that he has never smoked. He has never used smokeless tobacco. He reports that he does not currently use alcohol. He reports that he does not use drugs. Family History  [place] family history includes Diabetes in his maternal grandmother; Heart Disease in his mother; High Blood Pressure in his mother; Other in his father. There is no family history of bicuspid aortic valve, aneurysms, heart transplant, pacemakers, defibrillators, or sudden cardiac death.       Past Surgical History   Past Surgical History: Procedure Laterality Date    [address] [facility]    CARDIAC VALVE REPLACEMENT  2021    COARCTATION OF AORTA REPAIR  06/1984       Review of Systems   Constitutional: Negative for chills and fever  HENT: Negative for congestion, sinus pressure, sneezing and sore throat. Eyes: Negative for pain, discharge, redness and itching. Respiratory: Negative for apnea, cough  Gastrointestinal: Negative for blood in stool, constipation, diarrhea   Endocrine: Negative for cold intolerance, heat intolerance, polydipsia. Genitourinary: Negative for dysuria, enuresis, flank pain and hematuria. Musculoskeletal: Negative for arthralgias, joint swelling and neck pain. Neurological: Negative for numbness and headaches. Psychiatric/Behavioral: Negative for agitation, confusion, decreased concentration and dysphoric mood. Objective:     /82   Pulse 60   Ht 6' 0.5\" (1.842 m)   Wt 207 lb (93.9 kg)   BMI 27.69 kg/m²     Wt Readings from Last 3 Encounters:   08/22/22 207 lb (93.9 kg)   05/24/22 211 lb 3.2 oz (95.8 kg)   03/18/22 194 lb (88 kg)     BP Readings from Last 3 Encounters:   08/22/22 134/82   05/24/22 118/82   03/18/22 (!) 101/50       Nursing note and vitals reviewed. Physical Exam   Constitutional: Oriented to person, place, and time. Appears well-developed and well-nourished. HENT:   Head: Normocephalic and atraumatic. Eyes: EOM are normal. Pupils are equal, round, and reactive to light. Neck: Normal range of motion. Neck supple. No JVD present. Cardiovascular: Normal rate, regular rhythm, normal heart sounds and intact distal pulses. No murmur heard. Pulmonary/Chest: Effort normal and breath sounds normal. No respiratory distress. No wheezes. No rales. Abdominal: Soft. Bowel sounds are normal. No distension. There is no tenderness. Musculoskeletal: Normal range of motion. No edema. Neurological: Alert and oriented to person, place, and time.  No encounter of 11/09/21    ECHO Complete 2D W Doppler W Color    Narrative  Transthoracic Echocardiography Report (TTE)    Demographics    Patient Name  Lesli Cervantes      Gender             Male  [address]    MR #          [id number]      Race                   Ethnicity    Account #     [de-identified]      Room Number    Accession     [id number]     Date of Study      11/09/2021  Number    Date of Birth 1985     Referring          Robbie Beverly MD    Age           43 year(s)     Sonographer        MADELEINE Jaeger, HAZEL,  RDMS, RVT    Interpreting       Pat Lugo MD  Physician    Procedure    Type of Study    TTE procedure:ECHOCARDIOGRAM COMPLETE 2D W DOPPLER W COLOR. Procedure Date  Date: 11/09/2021 Start: 10:00 AM    Study Location: Echo Lab  Technical Quality: Adequate visualization    Indications:Mitral valve replacement and Coarctation of the aorta. Additional Medical History:endocarditis, systolic murmur, history of  coarctation repair, cardioembolic, MVR Bicor size #88    Patient Status: Routine    Height: 72 inches Weight: 154 pounds BSA: 1.91 m^2 BMI: 20.89 kg/m^2    BP: 116/60 mmHg    Conclusions    Summary  Moderately dilated left ventricular size and mildly reduced systolic  function. There was mild global hypokinesis. Wall thickness was within normal limits. Ejection fraction was estimated at 45-50%. Left atrial size was mildly dilated. S/p SMVR. DOPPLER: The transmitral velocity was within the normal range  with no evidence for mitral stenosis (mean gradient 6 mmHg). There was no  evidence of mitral regurgitation. The aortic valve was bicuspid with normal thickness and cuspal separation. There was trace aortic regurgitation. There was mild tricuspid regurgitation.   Elevated descending thoracic aortic velocities of 2.5 m/s suggestive of  residual coarctation of the aorta.    Signature    ----------------------------------------------------------------  Electronically signed by Mortimer Pinion MD (Interpreting  physician) on 11/11/2021 at 05:37 AM  ----------------------------------------------------------------    Findings    Mitral Valve  S/p SMVR. DOPPLER: The transmitral velocity was within the normal range  with no evidence for mitral stenosis (mean gradient 6 mmHg). There was no  evidence of mitral regurgitation. Aortic Valve  The aortic valve was bicuspid with normal thickness and cuspal separation. DOPPLER: Transaortic velocity was within the normal range with no evidence  of aortic stenosis. There was trace aortic regurgitation. Tricuspid Valve  The tricuspid valve structure was normal with normal leaflet separation. DOPPLER: There was no evidence of tricuspid stenosis. There was mild  tricuspid regurgitation. Pulmonic Valve  The pulmonic valve leaflets exhibited normal thickness, no calcification,  and normal cuspal separation. DOPPLER: The transpulmonic velocity was  within the normal range with trace regurgitation. Left Atrium  Left atrial size was mildly dilated. Left Ventricle  Moderately dilated left ventricular size and mildly reduced systolic  function. There was mild global hypokinesis. Wall thickness was within normal limits. Ejection fraction was estimated at 45-50%. Right Atrium  Right atrial size was normal.    Right Ventricle  The right ventricular size was normal with normal systolic function and  wall thickness. Pericardial Effusion  The pericardium was normal in appearance with no evidence of a pericardial  effusion. Pleural Effusion  No evidence of pleural effusion. Aorta / Great Vessels  IVC size is within normal limits with normal respiratory phasic changes.     M-Mode/2D Measurements & Calculations    LV Diastolic   LV Systolic Dimension:    AV Cusp Separation: 2.2 cmLA  Dimension: 6.5 5.6 cm Dimension: 3.4 cmAO Root  cm             LV Volume Diastolic: 894  Dimension: 3.2 cmLA Area: 22.2  LV FS:13.9 %   ml                        cm^2  LV PW          LV Volume Systolic: 223  Diastolic: 1.1 ml  cm             LV EDV/LV EDV Index: 275  Septum         ml/144 m^2LV ESV/LV ESV   RV Diastolic Dimension: 2.3 cm  Diastolic: 0.7 Index: 188 XN/36 m^2  cm             EF Calculated: 36 %       LA/Aorta: 1.06  Ascending Aorta: 3.2 cm  LV Length: 9.8 cm         LA volume/Index: 74.9 ml /39m^2    LV Area  Diastolic:  98.5 cm^2  LV Area  Systolic: 35.9  cm^2    Doppler Measurements & Calculations    MV Peak E-Wave: 175 cm/s AV Peak Velocity: 201  LVOT Peak Velocity: 73.7  MV Peak A-Wave: 150 cm/s cm/s                   cm/s  MV E/A Ratio: 1.17       AV Peak Gradient:      LVOT Peak Gradient: 2 mmHg  MV Peak Gradient: 12.25  16.16 mmHg  mmHg                                            TV Peak E-Wave: 39.8 cm/s  MV Mean Gradient: 6 mmHg                        TV Peak A-Wave: 48 cm/s  MV Mean Velocity: 113  cm/s                                            TV Peak Gradient: 0.63  MV Deceleration Time:    AV P1/2t: 869 msec     mmHg  398 msec                 IVRT: 92 msec          TR Velocity:236 cm/s  MV P1/2t: 125 msec                              TR Gradient:22.28 mmHg  MVA by PHT:1.76 cm^2                            PV Peak Velocity: 60.4  AV DVI (Vmax):0.37     cm/s  PV Peak Gradient: 1.46  mmHg    TN ED Velocity: 107 cm/s    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 Taking all meds. No volume on exam.  Lasix off.   Post surgical echo acceptable. Taking ASA. No residual neurologic deficits. Discussed diet/exercise/BP/weight loss/health lifestyle choices/lipids; the patient understands the goals and will try to comply.     Disposition:  1 year           Electronically signed by Carole Zhu MD   8/22/2022 at 9:52 AM EDT

## 2022-12-21 ENCOUNTER — TELEMEDICINE (OUTPATIENT)
Dept: FAMILY MEDICINE CLINIC | Age: 37
End: 2022-12-21

## 2022-12-21 DIAGNOSIS — J32.9 RHINOSINUSITIS: Primary | ICD-10-CM

## 2022-12-21 DIAGNOSIS — J02.9 SORE THROAT: ICD-10-CM

## 2022-12-21 DIAGNOSIS — I33.0 SUBACUTE BACTERIAL ENDOCARDITIS: ICD-10-CM

## 2022-12-21 DIAGNOSIS — R09.82 PND (POST-NASAL DRIP): ICD-10-CM

## 2022-12-21 DIAGNOSIS — J31.0 RHINOSINUSITIS: Primary | ICD-10-CM

## 2022-12-21 PROCEDURE — 99213 OFFICE O/P EST LOW 20 MIN: CPT | Performed by: NURSE PRACTITIONER

## 2022-12-21 RX ORDER — PREDNISONE 50 MG/1
50 TABLET ORAL DAILY
Qty: 4 TABLET | Refills: 0 | Status: SHIPPED | OUTPATIENT
Start: 2022-12-21 | End: 2022-12-25

## 2022-12-21 ASSESSMENT — ENCOUNTER SYMPTOMS
RHINORRHEA: 1
TROUBLE SWALLOWING: 1
SHORTNESS OF BREATH: 0

## 2022-12-21 NOTE — PROGRESS NOTES
Subjective:      Patient ID: Cuba Hay is a 42 y.o. male    HPI:  Acute for ST    TELEHEALTH EVALUATION -- Audio/Visual (During XLMOO-33 public health emergency)    Patient identification was verified at the start of the visit: Yes    Services were provided through a video synchronous discussion virtually to substitute for in-person clinic visit. Patient and provider were located at their individual homes. Patient has requested an audio/video evaluation for the following concern(s):    Chief Complaint   Patient presents with    Pharyngitis       Onset of 3 days with ST.  ST all day. PND and clearing out his throat. Runny nose and sinus congestion. Stuffy nose at night. No cough. No fever. NO body aches or chills. OTC:  Cough Drops, Nasal Spray, Nyquil      Patient Active Problem List   Diagnosis    Migraine headache with aura    Systolic murmur    H/O coarctation of aorta, repair at 12 months old    Septicemia ([facility] [address]    Cardioembolic stroke ([facility] [address]    Endocarditis    Hyponatremia    Sepsis ([facility] [address]       Review of Systems   HENT:  Positive for postnasal drip, rhinorrhea and trouble swallowing. Respiratory:  Negative for shortness of breath. Neurological:  Negative for dizziness and light-headedness. Psychiatric/Behavioral: Negative. Objective:   Physical Exam  Constitutional:       General: He is not in acute distress. Appearance: He is not ill-appearing. Pulmonary:      Effort: Pulmonary effort is normal. No respiratory distress. Neurological:      Mental Status: He is alert and oriented to person, place, and time. Psychiatric:         Mood and Affect: Mood normal.         Behavior: Behavior normal.       Assessment:       Diagnosis Orders   1. Rhinosinusitis        2. PND (post-nasal drip)  predniSONE (DELTASONE) 50 MG tablet      3. Sore throat  predniSONE (DELTASONE) 50 MG tablet      4.  Subacute bacterial endocarditis            Plan:     Orders as above   Fluids and rest  No Sudafed due to CARDIO hx  RTO if symptoms worsen or stay the same          Tania Espinoza, was evaluated through a synchronous (real-time) audio-video encounter. The patient (or guardian if applicable) is aware that this is a billable service, which includes applicable co-pays. This Virtual Visit was conducted with patient's (and/or legal guardian's) consent. The visit was conducted pursuant to the emergency declaration under the [address] [place] [address], [address] [place] [address] authority and the [employer] General Act. Patient identification was verified, and a caregiver was present when appropriate. The patient was located at Home: [address] [place] [address]. Provider was located at [facility] (Appt Dept): [place] [id number] [facility],  [address] [facility]. Total time spent for this encounter: Not billed by time    --BARBARA Landeros CNP on 12/21/2022 at 11:05 AM    An electronic signature was used to authenticate this note.

## 2023-01-29 ENCOUNTER — HOSPITAL ENCOUNTER (EMERGENCY)
Age: 38
Discharge: HOME | End: 2023-01-29
Payer: COMMERCIAL

## 2023-01-29 VITALS
HEART RATE: 74 BPM | RESPIRATION RATE: 16 BRPM | DIASTOLIC BLOOD PRESSURE: 72 MMHG | BODY MASS INDEX: 28.04 KG/M2 | TEMPERATURE: 99.3 F | WEIGHT: 207 LBS | HEIGHT: 72 IN | SYSTOLIC BLOOD PRESSURE: 139 MMHG | OXYGEN SATURATION: 97 %

## 2023-01-29 DIAGNOSIS — J02.0 STREPTOCOCCAL SORE THROAT: Primary | ICD-10-CM

## 2023-01-29 LAB
FLUAV AG SPEC QL: NEGATIVE
FLUBV AG SPEC QL: NEGATIVE
S PYO AG THROAT QL: POSITIVE

## 2023-01-29 PROCEDURE — 87651 STREP A DNA AMP PROBE: CPT

## 2023-01-29 PROCEDURE — 99213 OFFICE O/P EST LOW 20 MIN: CPT

## 2023-01-29 PROCEDURE — 87804 INFLUENZA ASSAY W/OPTIC: CPT

## 2023-01-29 PROCEDURE — 99213 OFFICE O/P EST LOW 20 MIN: CPT | Performed by: NURSE PRACTITIONER

## 2023-01-29 RX ORDER — AMOXICILLIN 500 MG/1
500 CAPSULE ORAL 2 TIMES DAILY
Qty: 20 CAPSULE | Refills: 0 | Status: SHIPPED | OUTPATIENT
Start: 2023-01-29 | End: 2023-02-08

## 2023-01-29 ASSESSMENT — ENCOUNTER SYMPTOMS
SHORTNESS OF BREATH: 0
COUGH: 1
SORE THROAT: 1

## 2023-01-29 ASSESSMENT — PAIN - FUNCTIONAL ASSESSMENT: PAIN_FUNCTIONAL_ASSESSMENT: NONE - DENIES PAIN

## 2023-01-29 NOTE — ED PROVIDER NOTES
[facility]  Urgent Care Encounter       CHIEF COMPLAINT       Chief Complaint   Patient presents with    Pharyngitis    Fever       Nurses Notes reviewed and I agree except as noted in the HPI. HISTORY OF PRESENT ILLNESS   Tania Espinoza is a 42 y.o. male who presents with complaints of a sore throat, fever, and headache. His symptoms started yesterday. This is a new problem. He admits to a persistent fever that is been low-grade. His highest was 102. He does admit to a very mild cough intermittently. Denies any body aches, shortness of breath, or chills. Does admit to being exposed to influenza on Friday. The history is provided by the patient. REVIEW OF SYSTEMS     Review of Systems   Constitutional:  Positive for fever. Negative for chills and fatigue. HENT:  Positive for sore throat. Negative for congestion. Respiratory:  Positive for cough. Negative for shortness of breath. Cardiovascular:  Negative for chest pain. Musculoskeletal:  Negative for myalgias. Neurological:  Positive for headaches. PAST MEDICAL HISTORY         Diagnosis Date    Cerebral artery occlusion with cerebral infarction [facility]) 10/2021    Endocarditis    CHF (congestive heart failure) ([facility])     Coarctation of aorta in      Endocarditis 10/2021    Hypertension        SURGICALHISTORY     Patient  has a past surgical history that includes Coarctation of aorta repair (1984); Cardiac surgery (); Cardiac catheterization; and Cardiac valve replacement ().     CURRENT MEDICATIONS       Previous Medications    ASPIRIN 81 MG EC TABLET    Take 1 tablet by mouth daily    LOSARTAN (COZAAR) 25 MG TABLET    Take 1 tablet by mouth daily    METOPROLOL SUCCINATE (TOPROL XL) 100 MG EXTENDED RELEASE TABLET    take 1 tablet by mouth every morning and 1 AND 1/2 tablets by mouth every night    MULTIPLE VITAMINS-MINERALS (THERAPEUTIC MULTIVITAMIN-MINERALS) TABLET    Take 1 tablet by mouth daily       ALLERGIES     Patient is has No Known Allergies. Patients   Immunization History   Administered Date(s) Administered    COVID-19, PFIZER PURPLE top, DILUTE for use, (age 15 y+), 30mcg/0.3mL 10/25/2021       FAMILY HISTORY     Patient's family history includes Diabetes in his maternal grandmother; Heart Disease in his mother; High Blood Pressure in his mother; Other in his father. SOCIAL HISTORY     Patient  reports that he has never smoked. He has been exposed to tobacco smoke. He has never used smokeless tobacco. He reports that he does not currently use alcohol. He reports that he does not use drugs. PHYSICAL EXAM     ED TRIAGE VITALS  BP: (!) 160/70, Temp: 99.3 °F (37.4 °C), Heart Rate: 76, Resp: 18, SpO2: 96 %,Estimated body mass index is 28.07 kg/m² as calculated from the following:    Height as of this encounter: 6' (1.829 m). Weight as of this encounter: 207 lb (93.9 kg). ,No LMP for male patient. Physical Exam  Vitals and nursing note reviewed. Constitutional:       General: He is not in acute distress. Appearance: He is ill-appearing. HENT:      Right Ear: Tympanic membrane normal.      Left Ear: Tympanic membrane normal.      Nose: No congestion or rhinorrhea. Mouth/Throat:      Mouth: Mucous membranes are moist.      Pharynx: Posterior oropharyngeal erythema present. No pharyngeal swelling. Tonsils: No tonsillar exudate. 0 on the right. 0 on the left. Pulmonary:      Effort: Pulmonary effort is normal.      Breath sounds: Normal breath sounds. No wheezing or rales. Lymphadenopathy:      Cervical: No cervical adenopathy. Skin:     General: Skin is warm and dry. Neurological:      Mental Status: He is alert and oriented to person, place, and time.        DIAGNOSTIC RESULTS     Labs:  Results for orders placed or performed during the hospital encounter of 01/29/23   Strep Screen Group A Throat   Result Value Ref Range    Rapid Strep A Screen POSITIVE (A) Rapid influenza A/B antigens   Result Value Ref Range    Flu A Antigen Negative NEGATIVE    Flu B Antigen Negative NEGATIVE       IMAGING:  None    EKG:  None    URGENT CARE COURSE:     Vitals:    01/29/23 1709   BP: (!) 160/70   Pulse: 76   Resp: 18   Temp: 99.3 °F (37.4 °C)   TempSrc: Temporal   SpO2: 96%   Weight: 207 lb (93.9 kg)   Height: 6' (1.829 m)       Medications - No data to display       PROCEDURES:  None    FINAL IMPRESSION      1. Streptococcal sore throat      DISPOSITION/ PLAN   DISPOSITION Decision To Discharge 01/29/2023 05:48:30 PM     Rapid strep test positive for bacterial infection. Amoxicillin for 10 days will be sent to the pharmacy. Rapid influenza testing negative. Advised may take over-the-counter antipyretics. Work note provided as requested.     PATIENT REFERRED TO:  BARBARA Anthony CNP  [facility] [place] [address]      DISCHARGE MEDICATIONS:  New Prescriptions    AMOXICILLIN (AMOXIL) 500 MG CAPSULE    Take 1 capsule by mouth 2 times daily for 10 days       Discontinued Medications    No medications on file       Current Discharge Medication List          BARBARA Sy CNP    (Please note that portions of this note were completed with a voice recognition program. Efforts were made to edit the dictations but occasionally words are mis-transcribed.)           BARBARA Sy CNP  01/29/23 1080

## 2023-01-29 NOTE — Clinical Note
Isaura Wiseman was seen and treated in our emergency department on 1/29/2023. He may return to work on 01/31/2023. If you have any questions or concerns, please don't hesitate to call.       Chaitanya Carranza, BARBARA - CNP

## 2023-01-29 NOTE — ED TRIAGE NOTES
Pt to [facility] ambulatory with a fever, and sore throat. This started on Friday.

## 2023-01-31 ENCOUNTER — PATIENT MESSAGE (OUTPATIENT)
Dept: FAMILY MEDICINE CLINIC | Age: 38
End: 2023-01-31

## 2023-01-31 DIAGNOSIS — B00.1 FEVER BLISTER: Primary | ICD-10-CM

## 2023-01-31 NOTE — TELEPHONE ENCOUNTER
From: Annie De La Paz  To: Kevin Garcia  Sent: 1/31/2023 8:36 AM EST  Subject: Prescription     Sunday I went to urgent care and tested positive for strep. With that I had a fever and I broke out with a bunch of fever blisters, is there anything you can get for me thats more powerful than over the counter ointments to help with those?

## 2023-02-09 DIAGNOSIS — Z87.74 HISTORY OF REPAIR OF COARCTATION OF AORTA: ICD-10-CM

## 2023-02-09 RX ORDER — METOPROLOL SUCCINATE 100 MG/1
TABLET, EXTENDED RELEASE ORAL
Qty: 75 TABLET | Refills: 5 | Status: SHIPPED | OUTPATIENT
Start: 2023-02-09

## 2023-02-27 RX ORDER — LOSARTAN POTASSIUM 25 MG/1
TABLET ORAL
Qty: 90 TABLET | Refills: 3 | Status: SHIPPED | OUTPATIENT
Start: 2023-02-27

## 2023-08-11 DIAGNOSIS — Z87.74 HISTORY OF REPAIR OF COARCTATION OF AORTA: ICD-10-CM

## 2023-08-11 RX ORDER — METOPROLOL SUCCINATE 100 MG/1
TABLET, EXTENDED RELEASE ORAL
Qty: 225 TABLET | Refills: 3 | Status: SHIPPED | OUTPATIENT
Start: 2023-08-11

## 2023-08-22 ENCOUNTER — OFFICE VISIT (OUTPATIENT)
Dept: CARDIOLOGY CLINIC | Age: 38
End: 2023-08-22

## 2023-08-22 VITALS
HEIGHT: 72 IN | WEIGHT: 211.6 LBS | HEART RATE: 49 BPM | BODY MASS INDEX: 28.66 KG/M2 | DIASTOLIC BLOOD PRESSURE: 81 MMHG | SYSTOLIC BLOOD PRESSURE: 148 MMHG

## 2023-08-22 DIAGNOSIS — Z87.74 HISTORY OF REPAIR OF COARCTATION OF AORTA: Primary | ICD-10-CM

## 2023-08-22 DIAGNOSIS — I63.9 CARDIOEMBOLIC STROKE (HCC): ICD-10-CM

## 2023-08-22 DIAGNOSIS — I33.0 BACTERIAL ENDOCARDITIS, UNSPECIFIED CHRONICITY: ICD-10-CM

## 2023-08-22 DIAGNOSIS — Q25.1 COARCTATION OF AORTA: ICD-10-CM

## 2023-08-22 NOTE — PROGRESS NOTES
[address] [place]  Dept: [phone]  Dept Fax: [phone]  Loc: [phone]    Visit Date: 8/22/2023    Mr. Vira Figueroa is a 34 y.o. male  who presented for: 1 year follow-up    Primary Cardiologist: Shanelle Raymond MD    Chief Complaint   Patient presents with    1 Year Follow Up       HPI:   HPI   Last seen in office on 8/22/2022 per Dr. Herson Bo. Per office note:  41 yo M PMH coarctation of the aorta s/p repair at 16 mo old and Streptococcus mitis endocarditis and mitral valve regurgitation s/p aortic valve debridement and MVR surgery 10/20/21, and CVA who presents for follow--up. Feels 100 x better. No residual neurologic deficits. Taking all meds. BP and HR well controlled. 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 Taking all meds. No volume on exam.  Lasix off. Post surgical echo acceptable. Taking ASA. No residual neurologic deficits. Discussed diet/exercise/BP/weight loss/health lifestyle choices/lipids; the patient understands the goals and will try to comply. Disposition: 1 year    Today's visit:   Subjective:   Feeling well  No chest discomfort   No shortness of breath  No swelling issues  Tolerating all meds; no bleeding on ASA  Working - doing all ADL  Wondering if any limitations regarding working out or weight lifting - just has not gotten back into since his surgery. Review of Systems   Constitutional: Negative for chills and fever  HENT: Negative for congestion, sinus pressure, sneezing and sore throat. Eyes: Negative for pain, discharge, redness and itching.    Respiratory: Negative for apnea, PND, orthopnea,

## 2023-08-22 NOTE — PATIENT INSTRUCTIONS
Continue current medications as prescribed. Stay as active as you can. Eat heart healthy diet. Monitor your heart rate and the rhythm - if becomes bothersome then call    Will check about exercise and weight lifting with Dr. Monika Huitron and let you know. Follow-up with your PCP as scheduled. Follow-up with Dr. Monika Huitron or Josefina RICHARDS in one year as scheduled or sooner if need.

## 2023-08-22 NOTE — PROGRESS NOTES
The patient presents for a 1-year follow-up. He has heart palpitations. The patient denies chest pain, shortness of breath, dizziness, and swelling of the lower extremities. EKG completed today.

## 2023-10-18 ENCOUNTER — TELEPHONE (OUTPATIENT)
Dept: PULMONOLOGY | Age: 38
End: 2023-10-18

## 2023-10-18 DIAGNOSIS — R93.89 ABNORMAL CT OF THE CHEST: Primary | ICD-10-CM

## 2023-10-18 DIAGNOSIS — R91.8 MULTIPLE LUNG NODULES ON CT: ICD-10-CM

## 2023-10-18 NOTE — TELEPHONE ENCOUNTER
[facility] called and stated patient will need a new CT order placed. He is coming in tomorrow for his testing. Thanks!

## 2023-10-19 ENCOUNTER — HOSPITAL ENCOUNTER (OUTPATIENT)
Dept: CT IMAGING | Age: 38
Discharge: HOME | End: 2023-10-19
Payer: COMMERCIAL

## 2023-10-19 DIAGNOSIS — R91.8 MULTIPLE LUNG NODULES ON CT: ICD-10-CM

## 2023-10-19 DIAGNOSIS — R93.89 ABNORMAL CT OF THE CHEST: ICD-10-CM

## 2023-10-19 PROCEDURE — 71250 CT THORAX DX C-: CPT

## 2024-01-15 ENCOUNTER — TELEPHONE (OUTPATIENT)
Dept: FAMILY MEDICINE CLINIC | Age: 39
End: 2024-01-15

## 2024-01-15 DIAGNOSIS — K00.9 DENTAL ANOMALY: Primary | ICD-10-CM

## 2024-01-15 RX ORDER — AMOXICILLIN 500 MG/1
2000 TABLET, FILM COATED ORAL ONCE
Qty: 4 TABLET | Refills: 0 | Status: SHIPPED | OUTPATIENT
Start: 2024-01-15 | End: 2024-01-15

## 2024-01-15 NOTE — TELEPHONE ENCOUNTER
Girlfriend Emily Guzman on [employer] called office stating pt is scheduled to see a dentist this Wednesday 1/17/24 at 8am. He had a filling fall out. Pt has a history of Endocarditis and requires an antibiotic prior. Pt uses [facility]. If no call back she will check with them after 2pm. Please advise.

## 2024-02-05 RX ORDER — LOSARTAN POTASSIUM 25 MG/1
TABLET ORAL
Qty: 90 TABLET | Refills: 2 | Status: SHIPPED | OUTPATIENT
Start: 2024-02-05

## 2024-07-01 ENCOUNTER — HOSPITAL ENCOUNTER (EMERGENCY)
Age: 39
Discharge: HOME | End: 2024-07-01
Payer: COMMERCIAL

## 2024-07-01 VITALS
HEART RATE: 77 BPM | WEIGHT: 200 LBS | TEMPERATURE: 99.3 F | DIASTOLIC BLOOD PRESSURE: 84 MMHG | OXYGEN SATURATION: 97 % | SYSTOLIC BLOOD PRESSURE: 151 MMHG | BODY MASS INDEX: 26.75 KG/M2 | RESPIRATION RATE: 19 BRPM

## 2024-07-01 DIAGNOSIS — J06.9 VIRAL URI: Primary | ICD-10-CM

## 2024-07-01 LAB
FLUAV AG SPEC QL: NEGATIVE
FLUBV AG SPEC QL: NEGATIVE

## 2024-07-01 PROCEDURE — 99213 OFFICE O/P EST LOW 20 MIN: CPT | Performed by: NURSE PRACTITIONER

## 2024-07-01 PROCEDURE — 87804 INFLUENZA ASSAY W/OPTIC: CPT

## 2024-07-01 PROCEDURE — 99213 OFFICE O/P EST LOW 20 MIN: CPT

## 2024-07-01 RX ORDER — IBUPROFEN 800 MG/1
800 TABLET ORAL EVERY 8 HOURS PRN
Qty: 30 TABLET | Refills: 0 | Status: SHIPPED | OUTPATIENT
Start: 2024-07-01

## 2024-07-01 ASSESSMENT — ENCOUNTER SYMPTOMS
WHEEZING: 0
DIARRHEA: 0
NAUSEA: 0
VOMITING: 0
SORE THROAT: 0
COUGH: 1
ABDOMINAL PAIN: 0
SHORTNESS OF BREATH: 0
SINUS PRESSURE: 0

## 2024-07-01 ASSESSMENT — PAIN - FUNCTIONAL ASSESSMENT: PAIN_FUNCTIONAL_ASSESSMENT: NONE - DENIES PAIN

## 2024-07-01 NOTE — DISCHARGE INSTRUCTIONS
Suggestions for symptom management that are available over the counter.   If you have questions regarding allergies or contraindications to use, please speak to the pharmacy staff or your family provider.    For fevers or pain: acetaminophen (Tylenol), ibuprofen (Motrin)  For dry cough: medications containing dextromethorphan, such as Delsym, Robitussin DM or Mucinex DM and medicated throat lozenges  For congestion or sinus pressure: decongestant nasal sprays, such as Afrin (for up to 3 days), medications containing guaifenesin to help break up mucus, such as Mucinex or Robitussin, nasal steroid sprays, such as Flonase, Sensimist, Rhinocort or Nasonex and saline nasal sprays, neti pot or sinus rinse bottle  For runny nose, sneezing or watery/itchy eyes: less sedating antihistamines, such as loratidine (Claritin), fexofenadine (Allegra) or Cetirizine (Zyrtec) and antihistamine eye drops, such as ketotifen (Zaditor, Alaway) or olopatadine (Pataday)  For sore throat:  Chloraseptic throat spray or sore throat lozenges or  Mucinex Instasoothe Sore Throat & Pain Cherry Spray  If you have high blood pressure, a brand like Coricidin HBP may be an option.you should avoid medications containing pseudoephedrine or phenylephrine, such as Sudafed,  running a humidifier in your bedroom may be helpful for many of your symptoms.  If your cough is keeping you awake at night, you can try raising your head with an extra pillow.  If the skin around your nose and lips becomes sore, you can put some petroleum jelly on the area.      Suggestions for over-the-counter supplements to help your immune system, if you have questions regarding allergies or contraindications to use, please speak to the pharmacy staff or your family provider.    Multi-vitamin/multi-mineral daily   Vitamin D3 3000 IU daily  Zinc 30 mg daily  Vitamin C 1000 mg twice daily  B-100 complex as daily as directed on bottle  Probiotic/Prebiotic christopher  Gargle with  30-Mar-2019 16:06

## 2024-07-01 NOTE — ED PROVIDER NOTES
[facility] URGENT [facility]ARE  UrgentCare Encounter      CHIEFCOMPLAINT       Chief Complaint   Patient presents with    Cough    Nasal Congestion    Fever       Nurses Notes reviewed and I agree except as noted in the HPI.  HISTORY OF PRESENT ILLNESS     Meliton Romero is a 39 y.o. male who presents to the urgent care for evaluation.  He states that since Friday he has had fever and cough. Whole family at home is sick.  He has been taking over-the-counter Motrin which relieves his fever.     The patient/patient representative has no other acute complaints at this time.    REVIEW OF SYSTEMS     Review of Systems   Constitutional:  Positive for fever. Negative for chills.   HENT:  Negative for congestion, ear pain, sinus pressure and sore throat.    Respiratory:  Positive for cough. Negative for shortness of breath and wheezing.    Cardiovascular:  Negative for chest pain.   Gastrointestinal:  Negative for abdominal pain, diarrhea, nausea and vomiting.   Skin:  Negative for rash.   Allergic/Immunologic: Negative for environmental allergies and food allergies.       PAST MEDICAL HISTORY         Diagnosis Date    Cerebral artery occlusion with cerebral infarction ([facility]) 10/2021    Endocarditis    CHF (congestive heart failure) ([facility])     Coarctation of aorta in      Endocarditis 10/2021    Hypertension        SURGICAL HISTORY     Patient  has a past surgical history that includes Coarctation of aorta repair (1984); Cardiac surgery (); Cardiac catheterization; and Cardiac valve replacement ().    CURRENT MEDICATIONS       Discharge Medication List as of 2024  2:31 PM        CONTINUE these medications which have NOT CHANGED    Details   losartan (COZAAR) 25 MG tablet take 1 tablet by mouth once daily, Disp-90 tablet, R-2Normal      metoprolol succinate (TOPROL XL) 100 MG extended release tablet Take 1 tablet by mouth every morning AND 1.5 tablets every evening. take 1 tablet by mouth every

## 2024-07-01 NOTE — ED NOTES
Symptoms started on Friday. States he has been taking motrin for fever and is concerned that the fever continues to come back. States he is coughing up phlegm. Last dose of motrin was at 0700. States his whole family at home has the same symptoms, but he is the first to be seen.     Consuelo Mcgowan RN  07/01/24 [id number]

## 2024-07-02 ENCOUNTER — TELEPHONE (OUTPATIENT)
Dept: FAMILY MEDICINE CLINIC | Age: 39
End: 2024-07-02

## 2024-08-18 DIAGNOSIS — Z87.74 HISTORY OF REPAIR OF COARCTATION OF AORTA: ICD-10-CM

## 2024-08-19 RX ORDER — METOPROLOL SUCCINATE 100 MG/1
TABLET, EXTENDED RELEASE ORAL
Qty: 225 TABLET | Refills: 0 | Status: CANCELLED | OUTPATIENT
Start: 2024-08-19

## 2024-08-19 RX ORDER — LOSARTAN POTASSIUM 25 MG/1
25 TABLET ORAL DAILY
Qty: 90 TABLET | Refills: 0 | Status: SHIPPED | OUTPATIENT
Start: 2024-08-19

## 2024-08-19 RX ORDER — METOPROLOL SUCCINATE 100 MG/1
TABLET, EXTENDED RELEASE ORAL
Qty: 225 TABLET | Refills: 0 | Status: SHIPPED | OUTPATIENT
Start: 2024-08-19

## 2024-09-12 ENCOUNTER — OFFICE VISIT (OUTPATIENT)
Dept: CARDIOLOGY CLINIC | Age: 39
End: 2024-09-12
Payer: COMMERCIAL

## 2024-09-12 VITALS
DIASTOLIC BLOOD PRESSURE: 72 MMHG | HEART RATE: 64 BPM | BODY MASS INDEX: 27.04 KG/M2 | HEIGHT: 73 IN | WEIGHT: 204 LBS | SYSTOLIC BLOOD PRESSURE: 124 MMHG

## 2024-09-12 DIAGNOSIS — I33.0 BACTERIAL ENDOCARDITIS, UNSPECIFIED CHRONICITY: Primary | ICD-10-CM

## 2024-09-12 DIAGNOSIS — Z87.74 HISTORY OF REPAIR OF COARCTATION OF AORTA: ICD-10-CM

## 2024-09-12 PROCEDURE — G8419 CALC BMI OUT NRM PARAM NOF/U: HCPCS | Performed by: INTERNAL MEDICINE

## 2024-09-12 PROCEDURE — G8427 DOCREV CUR MEDS BY ELIG CLIN: HCPCS | Performed by: INTERNAL MEDICINE

## 2024-09-12 PROCEDURE — 1036F TOBACCO NON-USER: CPT | Performed by: INTERNAL MEDICINE

## 2024-09-12 PROCEDURE — 99213 OFFICE O/P EST LOW 20 MIN: CPT | Performed by: INTERNAL MEDICINE

## 2024-09-12 RX ORDER — LOSARTAN POTASSIUM 25 MG/1
25 TABLET ORAL DAILY
Qty: 90 TABLET | Refills: 3 | Status: SHIPPED | OUTPATIENT
Start: 2024-09-12

## 2024-09-12 RX ORDER — METOPROLOL SUCCINATE 100 MG/1
TABLET, EXTENDED RELEASE ORAL
Qty: 225 TABLET | Refills: 3 | Status: SHIPPED | OUTPATIENT
Start: 2024-09-12

## 2025-08-25 RX ORDER — LOSARTAN POTASSIUM 25 MG/1
25 TABLET ORAL DAILY
Qty: 90 TABLET | Refills: 0 | Status: SHIPPED | OUTPATIENT
Start: 2025-08-25

## 2025-09-04 DIAGNOSIS — Z87.74 HISTORY OF REPAIR OF COARCTATION OF AORTA: ICD-10-CM

## 2025-09-05 RX ORDER — METOPROLOL SUCCINATE 100 MG/1
TABLET, EXTENDED RELEASE ORAL
Qty: 225 TABLET | Refills: 0 | Status: SHIPPED | OUTPATIENT
Start: 2025-09-05